# Patient Record
Sex: MALE | Race: WHITE | Employment: OTHER | URBAN - METROPOLITAN AREA
[De-identification: names, ages, dates, MRNs, and addresses within clinical notes are randomized per-mention and may not be internally consistent; named-entity substitution may affect disease eponyms.]

---

## 2017-03-01 ENCOUNTER — HOSPITAL ENCOUNTER (OUTPATIENT)
Dept: CT IMAGING | Age: 67
Discharge: HOME | End: 2017-03-01
Attending: INTERNAL MEDICINE | Admitting: INTERNAL MEDICINE
Payer: MEDICARE

## 2017-03-01 DIAGNOSIS — J62.8 SILICOSIS (HCC): ICD-10-CM

## 2017-03-01 PROCEDURE — 71250 CT THORAX DX C-: CPT

## 2017-03-29 ENCOUNTER — HOSPITAL ENCOUNTER (OUTPATIENT)
Dept: CT IMAGING | Age: 67
Discharge: HOME | End: 2017-03-29
Payer: MEDICARE

## 2017-03-29 DIAGNOSIS — M45.9 ANKYLOSING SPONDYLITIS (HCC): ICD-10-CM

## 2017-03-29 PROCEDURE — 72125 CT NECK SPINE W/O DYE: CPT

## 2017-05-17 ENCOUNTER — HOSPITAL ENCOUNTER (OUTPATIENT)
Dept: MRI IMAGING | Age: 67
Discharge: HOME | End: 2017-05-17
Payer: MEDICARE

## 2017-05-17 DIAGNOSIS — R93.89 ABNORMAL CT SCAN: ICD-10-CM

## 2017-05-17 PROCEDURE — 72141 MRI NECK SPINE W/O DYE: CPT

## 2017-12-07 ENCOUNTER — HOSPITAL ENCOUNTER (OUTPATIENT)
Dept: PULMONOLOGY | Age: 67
Setting detail: THERAPIES SERIES
Discharge: HOME | End: 2017-12-07
Payer: MEDICARE

## 2017-12-07 VITALS — HEIGHT: 71 IN | WEIGHT: 315 LBS | BODY MASS INDEX: 44.1 KG/M2

## 2017-12-07 PROCEDURE — G0239 OTH RESP PROC, GROUP: HCPCS

## 2017-12-07 PROCEDURE — G0238 OTH RESP PROC, INDIV: HCPCS

## 2017-12-07 PROCEDURE — G0237 THERAPEUTIC PROCD STRG ENDUR: HCPCS

## 2017-12-07 RX ORDER — METOPROLOL SUCCINATE 100 MG/1
100 TABLET, EXTENDED RELEASE ORAL DAILY
Status: ON HOLD | COMMUNITY
End: 2017-12-11

## 2017-12-07 RX ORDER — HYDROCHLOROTHIAZIDE 12.5 MG/1
12.5 TABLET ORAL DAILY
Status: ON HOLD | COMMUNITY
End: 2017-12-11 | Stop reason: HOSPADM

## 2017-12-07 RX ORDER — ALBUTEROL SULFATE 2.5 MG/3ML
2.5 SOLUTION RESPIRATORY (INHALATION) 4 TIMES DAILY
COMMUNITY

## 2017-12-07 RX ORDER — FLUTICASONE FUROATE AND VILANTEROL 200; 25 UG/1; UG/1
200 POWDER RESPIRATORY (INHALATION) DAILY
COMMUNITY

## 2017-12-07 RX ORDER — CITALOPRAM 40 MG/1
40 TABLET ORAL DAILY
COMMUNITY
End: 2020-06-01 | Stop reason: SDUPTHER

## 2017-12-07 RX ORDER — MELOXICAM 15 MG/1
15 TABLET ORAL DAILY
Status: ON HOLD | COMMUNITY
End: 2017-12-11 | Stop reason: HOSPADM

## 2017-12-07 NOTE — PROGRESS NOTES
Completed pt. evaluation including 6 MWT for participation in the pulmonary rehab program.Additon instruction provided on Sinan scales and SOB , instructed on PLB to help with SOB with walking.

## 2017-12-09 ENCOUNTER — HOSPITAL ENCOUNTER (INPATIENT)
Age: 67
LOS: 2 days | Discharge: HOME | DRG: 247 | End: 2017-12-11
Attending: EMERGENCY MEDICINE | Admitting: INTERNAL MEDICINE
Payer: MEDICARE

## 2017-12-09 DIAGNOSIS — I21.3 ST ELEVATION MYOCARDIAL INFARCTION (STEMI), UNSPECIFIED ARTERY (HCC): Primary | ICD-10-CM

## 2017-12-09 LAB
ALBUMIN SERPL-MCNC: 3.7 G/DL (ref 3.5–5.2)
ALBUMIN/GLOBULIN RATIO: 1.1 (ref 1–2.5)
ALLEN TEST: ABNORMAL
ALP BLD-CCNC: 262 U/L (ref 40–129)
ALT SERPL-CCNC: 32 U/L (ref 5–41)
ANION GAP SERPL CALCULATED.3IONS-SCNC: 15 MMOL/L (ref 9–17)
ANION GAP: 5 MMOL/L (ref 7–16)
AST SERPL-CCNC: 93 U/L
BILIRUB SERPL-MCNC: 0.43 MG/DL (ref 0.3–1.2)
BUN BLDV-MCNC: 16 MG/DL (ref 8–23)
BUN/CREAT BLD: ABNORMAL (ref 9–20)
CALCIUM SERPL-MCNC: 9.3 MG/DL (ref 8.6–10.4)
CHLORIDE BLD-SCNC: 98 MMOL/L (ref 98–107)
CO2: 23 MMOL/L (ref 20–31)
CREAT SERPL-MCNC: 0.77 MG/DL (ref 0.7–1.2)
FIO2: ABNORMAL
GFR AFRICAN AMERICAN: >60 ML/MIN
GFR NON-AFRICAN AMERICAN: >60 ML/MIN
GFR NON-AFRICAN AMERICAN: >60 ML/MIN
GFR SERPL CREATININE-BSD FRML MDRD: >60 ML/MIN
GFR SERPL CREATININE-BSD FRML MDRD: ABNORMAL ML/MIN/{1.73_M2}
GFR SERPL CREATININE-BSD FRML MDRD: ABNORMAL ML/MIN/{1.73_M2}
GFR SERPL CREATININE-BSD FRML MDRD: NORMAL ML/MIN/{1.73_M2}
GLUCOSE BLD-MCNC: 123 MG/DL (ref 70–99)
GLUCOSE BLD-MCNC: 185 MG/DL (ref 74–100)
HCO3 VENOUS: 25.8 MMOL/L (ref 22–29)
HCT VFR BLD CALC: 47.7 % (ref 40.7–50.3)
HEMOGLOBIN: 15.4 G/DL (ref 13–17)
MAGNESIUM: 1.9 MG/DL (ref 1.6–2.6)
MCH RBC QN AUTO: 28.2 PG (ref 25.2–33.5)
MCHC RBC AUTO-ENTMCNC: 32.3 G/DL (ref 28.4–34.8)
MCV RBC AUTO: 87.4 FL (ref 82.6–102.9)
MODE: ABNORMAL
NEGATIVE BASE EXCESS, VEN: ABNORMAL (ref 0–2)
O2 DEVICE/FLOW/%: ABNORMAL
O2 SAT, VEN: 90 % (ref 60–85)
PATIENT TEMP: ABNORMAL
PCO2, VEN: 36.7 MM HG (ref 41–51)
PDW BLD-RTO: 13.6 % (ref 11.8–14.4)
PH VENOUS: 7.46 (ref 7.32–7.43)
PLATELET # BLD: 215 K/UL (ref 138–453)
PMV BLD AUTO: 10.9 FL (ref 8.1–13.5)
PO2, VEN: 55.8 MM HG (ref 30–50)
POC CHLORIDE: 107 MMOL/L (ref 98–107)
POC CREATININE: 0.96 MG/DL (ref 0.51–1.19)
POC HEMATOCRIT: 52 % (ref 41–53)
POC HEMOGLOBIN: 17.8 G/DL (ref 13.5–17.5)
POC IONIZED CALCIUM: 1.17 MMOL/L (ref 1.15–1.33)
POC LACTIC ACID: 2.72 MMOL/L (ref 0.56–1.39)
POC PCO2 TEMP: ABNORMAL MM HG
POC PH TEMP: ABNORMAL
POC PO2 TEMP: ABNORMAL MM HG
POC POTASSIUM: 4.9 MMOL/L (ref 3.5–4.5)
POC SODIUM: 138 MMOL/L (ref 138–146)
POC TROPONIN I: 1.27 NG/ML (ref 0–0.1)
POC TROPONIN INTERP: ABNORMAL
POSITIVE BASE EXCESS, VEN: 2 (ref 0–3)
POTASSIUM SERPL-SCNC: 4 MMOL/L (ref 3.7–5.3)
RBC # BLD: 5.46 M/UL (ref 4.21–5.77)
SAMPLE SITE: ABNORMAL
SODIUM BLD-SCNC: 136 MMOL/L (ref 135–144)
TOTAL CO2, VENOUS: 27 MMOL/L (ref 23–30)
TOTAL PROTEIN: 7 G/DL (ref 6.4–8.3)
TROPONIN INTERP: ABNORMAL
TROPONIN T: 1.99 NG/ML
TSH SERPL DL<=0.05 MIU/L-ACNC: 0.96 MIU/L (ref 0.3–5)
WBC # BLD: 12.4 K/UL (ref 3.5–11.3)

## 2017-12-09 PROCEDURE — 82803 BLOOD GASES ANY COMBINATION: CPT

## 2017-12-09 PROCEDURE — 82435 ASSAY OF BLOOD CHLORIDE: CPT

## 2017-12-09 PROCEDURE — 6370000000 HC RX 637 (ALT 250 FOR IP): Performed by: INTERNAL MEDICINE

## 2017-12-09 PROCEDURE — 2000000000 HC ICU R&B

## 2017-12-09 PROCEDURE — C1769 GUIDE WIRE: HCPCS

## 2017-12-09 PROCEDURE — 82565 ASSAY OF CREATININE: CPT

## 2017-12-09 PROCEDURE — 93458 L HRT ARTERY/VENTRICLE ANGIO: CPT | Performed by: INTERNAL MEDICINE

## 2017-12-09 PROCEDURE — 93005 ELECTROCARDIOGRAM TRACING: CPT

## 2017-12-09 PROCEDURE — 84132 ASSAY OF SERUM POTASSIUM: CPT

## 2017-12-09 PROCEDURE — 83605 ASSAY OF LACTIC ACID: CPT

## 2017-12-09 PROCEDURE — 84484 ASSAY OF TROPONIN QUANT: CPT

## 2017-12-09 PROCEDURE — 2580000003 HC RX 258: Performed by: INTERNAL MEDICINE

## 2017-12-09 PROCEDURE — 85014 HEMATOCRIT: CPT

## 2017-12-09 PROCEDURE — C1874 STENT, COATED/COV W/DEL SYS: HCPCS

## 2017-12-09 PROCEDURE — 6360000002 HC RX W HCPCS

## 2017-12-09 PROCEDURE — 82947 ASSAY GLUCOSE BLOOD QUANT: CPT

## 2017-12-09 PROCEDURE — 92941 PRQ TRLML REVSC TOT OCCL AMI: CPT | Performed by: INTERNAL MEDICINE

## 2017-12-09 PROCEDURE — 99285 EMERGENCY DEPT VISIT HI MDM: CPT

## 2017-12-09 PROCEDURE — 6370000000 HC RX 637 (ALT 250 FOR IP)

## 2017-12-09 PROCEDURE — 83735 ASSAY OF MAGNESIUM: CPT

## 2017-12-09 PROCEDURE — 82330 ASSAY OF CALCIUM: CPT

## 2017-12-09 PROCEDURE — 2500000003 HC RX 250 WO HCPCS

## 2017-12-09 PROCEDURE — 84295 ASSAY OF SERUM SODIUM: CPT

## 2017-12-09 PROCEDURE — 85027 COMPLETE CBC AUTOMATED: CPT

## 2017-12-09 PROCEDURE — 83036 HEMOGLOBIN GLYCOSYLATED A1C: CPT

## 2017-12-09 PROCEDURE — 92920 PRQ TRLUML C ANGIOP 1ART&/BR: CPT | Performed by: INTERNAL MEDICINE

## 2017-12-09 PROCEDURE — C1894 INTRO/SHEATH, NON-LASER: HCPCS

## 2017-12-09 PROCEDURE — 027034Z DILATION OF CORONARY ARTERY, ONE ARTERY WITH DRUG-ELUTING INTRALUMINAL DEVICE, PERCUTANEOUS APPROACH: ICD-10-PCS | Performed by: INTERNAL MEDICINE

## 2017-12-09 PROCEDURE — 02C13ZZ EXTIRPATION OF MATTER FROM CORONARY ARTERY, TWO ARTERIES, PERCUTANEOUS APPROACH: ICD-10-PCS | Performed by: INTERNAL MEDICINE

## 2017-12-09 PROCEDURE — 80053 COMPREHEN METABOLIC PANEL: CPT

## 2017-12-09 PROCEDURE — 4A023N7 MEASUREMENT OF CARDIAC SAMPLING AND PRESSURE, LEFT HEART, PERCUTANEOUS APPROACH: ICD-10-PCS | Performed by: INTERNAL MEDICINE

## 2017-12-09 PROCEDURE — C1760 CLOSURE DEV, VASC: HCPCS

## 2017-12-09 PROCEDURE — C1725 CATH, TRANSLUMIN NON-LASER: HCPCS

## 2017-12-09 PROCEDURE — 84443 ASSAY THYROID STIM HORMONE: CPT

## 2017-12-09 PROCEDURE — 36415 COLL VENOUS BLD VENIPUNCTURE: CPT

## 2017-12-09 PROCEDURE — C1757 CATH, THROMBECTOMY/EMBOLECT: HCPCS

## 2017-12-09 PROCEDURE — B2151ZZ FLUOROSCOPY OF LEFT HEART USING LOW OSMOLAR CONTRAST: ICD-10-PCS | Performed by: INTERNAL MEDICINE

## 2017-12-09 PROCEDURE — C1887 CATHETER, GUIDING: HCPCS

## 2017-12-09 PROCEDURE — B2111ZZ FLUOROSCOPY OF MULTIPLE CORONARY ARTERIES USING LOW OSMOLAR CONTRAST: ICD-10-PCS | Performed by: INTERNAL MEDICINE

## 2017-12-09 RX ORDER — FLUTICASONE FUROATE AND VILANTEROL 200; 25 UG/1; UG/1
200 POWDER RESPIRATORY (INHALATION) DAILY
Status: DISCONTINUED | OUTPATIENT
Start: 2017-12-09 | End: 2017-12-09 | Stop reason: CLARIF

## 2017-12-09 RX ORDER — ACETAMINOPHEN 325 MG/1
650 TABLET ORAL EVERY 4 HOURS PRN
Status: DISCONTINUED | OUTPATIENT
Start: 2017-12-09 | End: 2017-12-11 | Stop reason: HOSPADM

## 2017-12-09 RX ORDER — LABETALOL HYDROCHLORIDE 5 MG/ML
10 INJECTION, SOLUTION INTRAVENOUS EVERY 4 HOURS PRN
Status: DISCONTINUED | OUTPATIENT
Start: 2017-12-09 | End: 2017-12-11 | Stop reason: HOSPADM

## 2017-12-09 RX ORDER — SODIUM CHLORIDE 0.9 % (FLUSH) 0.9 %
10 SYRINGE (ML) INJECTION EVERY 12 HOURS SCHEDULED
Status: DISCONTINUED | OUTPATIENT
Start: 2017-12-09 | End: 2017-12-11 | Stop reason: HOSPADM

## 2017-12-09 RX ORDER — MONTELUKAST SODIUM 10 MG/1
10 TABLET ORAL NIGHTLY
COMMUNITY

## 2017-12-09 RX ORDER — SODIUM CHLORIDE 0.9 % (FLUSH) 0.9 %
10 SYRINGE (ML) INJECTION PRN
Status: DISCONTINUED | OUTPATIENT
Start: 2017-12-09 | End: 2017-12-11 | Stop reason: HOSPADM

## 2017-12-09 RX ORDER — ASPIRIN 81 MG/1
81 TABLET, CHEWABLE ORAL DAILY
Status: DISCONTINUED | OUTPATIENT
Start: 2017-12-10 | End: 2017-12-11 | Stop reason: HOSPADM

## 2017-12-09 RX ORDER — ATORVASTATIN CALCIUM 80 MG/1
80 TABLET, FILM COATED ORAL NIGHTLY
Status: DISCONTINUED | OUTPATIENT
Start: 2017-12-09 | End: 2017-12-11 | Stop reason: HOSPADM

## 2017-12-09 RX ORDER — ONDANSETRON 2 MG/ML
4 INJECTION INTRAMUSCULAR; INTRAVENOUS EVERY 6 HOURS PRN
Status: DISCONTINUED | OUTPATIENT
Start: 2017-12-09 | End: 2017-12-11 | Stop reason: HOSPADM

## 2017-12-09 RX ORDER — CITALOPRAM 20 MG/1
40 TABLET ORAL DAILY
Status: DISCONTINUED | OUTPATIENT
Start: 2017-12-10 | End: 2017-12-11 | Stop reason: HOSPADM

## 2017-12-09 RX ADMIN — Medication 10 ML: at 21:02

## 2017-12-09 RX ADMIN — MOMETASONE FUROATE AND FORMOTEROL FUMARATE DIHYDRATE 2 PUFF: 200; 5 AEROSOL RESPIRATORY (INHALATION) at 22:21

## 2017-12-09 RX ADMIN — METOPROLOL TARTRATE 25 MG: 25 TABLET ORAL at 21:02

## 2017-12-09 RX ADMIN — ATORVASTATIN CALCIUM 80 MG: 80 TABLET, FILM COATED ORAL at 21:02

## 2017-12-09 RX ADMIN — TICAGRELOR 90 MG: 90 TABLET ORAL at 21:02

## 2017-12-09 ASSESSMENT — ENCOUNTER SYMPTOMS
BLOOD IN STOOL: 0
ABDOMINAL PAIN: 0
VOMITING: 0
SINUS PRESSURE: 0
SINUS PAIN: 0
COLOR CHANGE: 0
TROUBLE SWALLOWING: 0
NAUSEA: 0
CONSTIPATION: 0
COUGH: 0
ABDOMINAL DISTENTION: 0
PHOTOPHOBIA: 0
VOICE CHANGE: 0
CHOKING: 0
DIARRHEA: 0
BACK PAIN: 1
SORE THROAT: 0
WHEEZING: 0
SHORTNESS OF BREATH: 1
CHEST TIGHTNESS: 1

## 2017-12-09 ASSESSMENT — PAIN SCALES - GENERAL: PAINLEVEL_OUTOF10: 0

## 2017-12-09 NOTE — OP NOTE
[facility]  Coronary Angiography        Date:   12/9/2017  Patient name:  Carter Felder  Date of admission:  12/9/2017  2:10 PM  MRN:   [id number]  YOB: 1950      CARDIAC CATHETERIZATION      Operators:  Primary: Dr. Rosalinda Calvert (Attending Physician)      Indications for cath:   STEMI, Anterior      Procedure performed:  Coronary angiography, LV gram     Access:   Right Femoral artery      After informed consent was obtained with explanation of the risks and benefits, patient was brought to the cath lab. The right groin were prepped and draped in sterile fashion. 1% lidocaine was used for local block. The right femoral artery was cannulated with 6 Fr sheath with brisk arterial blood return. The side port was frequently flushed and aspirated with normal saline. Findings:    Left main: Normal    LAD: Acute 24 mm ostial 100% occlusion requiring PTCA and DAGO (Xience 3.5 x 28) - reducing stenosis to 0% with LOR-III flow    LCX: Mild irregularities, 20-30% stenosis    RCA: Mild irregularities, 20-30% stenosis    Ramus: Mild irregularities, 20-30% stenosis  After PTCA to LAD, sudden occlusion of distal Ramus requiring thrombectomy and PTCA with restoration of LOR-III flow    The LV gram was performed in the LANE 30 position. LVEF: 35%. LV Wall Motion: Anterolateral and apical wall akinesis      Conclusions:  1. Acute occlusion of ostial LAD requiring PTCA and DAGO (Xience 3.5 x 28)  2. After PTCA to LAD, sudden occlusion of distal Ramus requiring thrombectomy and PTCA with restoration of LOR-III flow  3. Moderately reduced LVEF 35%      Recommendations:  1. Post-stent protocol  2. Dual-antiplatelet therapy for 1 year (ASA and Brilinta)  3. Continue optimal medical therapy  4.  Risk factor modification      Teresa Zambrano MD  Cardiovascular Diseases Fellow  [address] [place] [address]    Attending Physician Statement  I have discussed the case of Carter Felder including

## 2017-12-09 NOTE — ED PROVIDER NOTES
[facility] ED  Emergency Department Encounter  Emergency Medicine Resident     Pt Name: Maddi Alvarado  MRN: [id number]  [place] 1950  Date of evaluation: 12/9/17  PCP:  Julio Cesar Patricia DO    CHIEF COMPLAINT       Chief Complaint   Patient presents with    Chest Pain     HISTORY OF PRESENT ILLNESS  (Location/Symptom, Timing/Onset, Context/Setting, Quality, Duration, Modifying Factors, Severity.)      Maddi Alvarado is a 79 y.o. male who presented to the emergency department with Acute substernal chest pain that radiated to both arms and back since 11:00 this morning. The patient has a 25 year history of SVT and felt as though the pain and palpitations were related to his history of SVT. On EMS report a STEMI alert was called. We contacted Dr. Bao West, on on-call interventional cardiology, who asked for EKGs to be sent by text message. EKGs were faxed including an old EKG (11/22/2017) brought in by the patient's wife which showed significant changes. Cardiology fellow arrived the patient's bedside and began proceedings to move the patient Cath Lab. PAST MEDICAL / SURGICAL / SOCIAL / FAMILY HISTORY     Past Medical Hx:    has a past medical history of Asthma not dependent on systemic steroids without complication; DEB treated with BiPAP; and Pulmonary hypertension. Patient also has a history of SVT for 25 years. Past Surgical Hx:  Patient has had no surgeries    Social Hx:  Social History     Social History    Marital status:      Spouse name: N/A    Number of children: N/A    Years of education: N/A     Occupational History    Not on file.      Social History Main Topics    Smoking status: Not on file    Smokeless tobacco: Not on file    Alcohol use Not on file    Drug use: Unknown    Sexual activity: Not on file     Other Topics Concern    Not on file     Social History Narrative    No narrative on file     Family Hx:  No relevant family history is headaches. PHYSICAL EXAM   (up to 7 for level 4, 8 or more for level 5)      BP (!) 142/92   Pulse 90   Temp 98.2 °F (36.8 °C) (Oral)   Resp 15   Ht 5' 11\" (1.803 m)   Wt (!) 309 lb (140.2 kg)   SpO2 97%   BMI 43.10 kg/m²     Physical Exam   Constitutional: He is oriented to person, place, and time. He appears well-developed and well-nourished. No distress. HENT:   Head: Normocephalic and atraumatic. Right Ear: External ear normal.   Left Ear: External ear normal.   Eyes: EOM are normal. Pupils are equal, round, and reactive to light. Neck: Normal range of motion. No JVD present. No tracheal deviation present. Cardiovascular: Normal rate, normal heart sounds and intact distal pulses. Exam reveals no gallop and no friction rub. No murmur heard. Pulmonary/Chest: Effort normal and breath sounds normal. No respiratory distress. He has no wheezes. Abdominal: Soft. He exhibits no distension. There is no tenderness. Musculoskeletal: Normal range of motion. He exhibits no deformity. Neurological: He is alert and oriented to person, place, and time. Skin: Skin is warm and dry. He is not diaphoretic. DIFFERENTIAL  DIAGNOSIS     PLAN (LABS / IMAGING / EKG):  Orders Placed This Encounter   Procedures    Hemoglobin and hematocrit, blood    SODIUM (POC)    POTASSIUM (POC)    CHLORIDE (POC)    CALCIUM, IONIC (POC)    POCT troponin    Venous Blood Gas, POC    Creatinine W/GFR Point of Care    Lactic Acid, POC    POCT Glucose    Anion Gap (Calc) POC     MEDICATIONS ORDERED:  No orders of the defined types were placed in this encounter.     DDX:     Chest Pain DDX:   Emergent: ACS/NSTEMI/STEMI/angina, arrhythmia, trauma, aortic dissection,  PE, PNA, pneumothroax, esophageal rupture, tamponade, Cocaine use  Nonemergent: pneumonia, pericarditis, GERD, MSK, Endocarditis, anxiety  Evaluated for: diaphoresis, present chest pain, tachypnea, BP both arms, heart sounds, JVD, tender chest wall, wheezing    DIAGNOSTIC RESULTS / EMERGENCY DEPARTMENT COURSE / MDM     LABS:  Results for orders placed or performed during the hospital encounter of 12/09/17   Hemoglobin and hematocrit, blood   Result Value Ref Range    POC Hemoglobin 17.8 (H) 13.5 - 17.5 g/dL    POC Hematocrit 52 41 - 53 %   SODIUM (POC)   Result Value Ref Range    POC Sodium 138 138 - 146 mmol/L   POTASSIUM (POC)   Result Value Ref Range    POC Potassium 4.9 (H) 3.5 - 4.5 mmol/L   CHLORIDE (POC)   Result Value Ref Range    POC Chloride 107 98 - 107 mmol/L   CALCIUM, IONIC (POC)   Result Value Ref Range    POC Ionized Calcium 1.17 1.15 - 1.33 mmol/L   POCT troponin   Result Value Ref Range    POC Troponin I 1.27 (HH) 0.00 - 0.10 ng/mL    POC Troponin Interp       The Troponin-I (POC) results cannot be compared to the Troponin-T results.    Venous Blood Gas, POC   Result Value Ref Range    pH, Wil 7.456 (H) 7.320 - 7.430    pCO2, Wil 36.7 (L) 41.0 - 51.0 mm Hg    pO2, Wil 55.8 (H) 30.0 - 50.0 mm Hg    HCO3, Venous 25.8 22.0 - 29.0 mmol/L    Total CO2, Venous 27 23.0 - 30.0 mmol/L    Negative Base Excess, Wil NOT REPORTED 0.0 - 2.0    Positive Base Excess, Wil 2 0.0 - 3.0    O2 Sat, Wil 90 (H) 60.0 - 85.0 %    O2 Device/Flow/% NOT REPORTED     Wilbert Test NOT REPORTED     Sample Site NOT REPORTED     Mode NOT REPORTED     FIO2 NOT REPORTED     Pt Temp NOT REPORTED     POC pH Temp NOT REPORTED     POC pCO2 Temp NOT REPORTED mm Hg    POC pO2 Temp NOT REPORTED mm Hg   Creatinine W/GFR Point of Care   Result Value Ref Range    POC Creatinine 0.96 0.51 - 1.19 mg/dL    GFR Comment >60 >60 mL/min    GFR Non-African American >60 >60 mL/min    GFR Comment         Lactic Acid, POC   Result Value Ref Range    POC Lactic Acid 2.72 (H) 0.56 - 1.39 mmol/L   POCT Glucose   Result Value Ref Range    POC Glucose 185 (H) 74 - 100 mg/dL   Anion Gap (Calc) POC   Result Value Ref Range    Anion Gap 5 (L) 7 - 16 mmol/L     IMPRESSION:   Acute myocardial infarction    RADIOLOGY:  Not indicated  No results found.     EKG  Rhythm: normal sinus   Rate: normal 90 beats per minute  Axis: normal  Ectopy: none  Conduction: normal  ST Segments: elevation in  v1, v2, v3, aVr and aVl and depression in  lateral leads and inferior leads  T Waves: no acute change and inversion in  I and aVl  Q Waves: v1 and v2    Clinical Impression: myocardial infarction  anterior and septal    EKG Interpretation #2 (posterior leads)  Rhythm: normal sinus   Rate: normal  Axis: normal  Ectopy: none  Conduction: normal  ST Segments: no posterior lead elevation; elevation L, R, V1, 2  T Waves: inversion I, L, posterior V4-6  Q Waves: none     Clinical Impression: no acute changes and normal posterior wall EKG    Paulino Suazo MD  Emergency Medicine Resident    All EKG's are interpreted by the Emergency Department Physician who either signs or Co-signs this chart in the absence of a cardiologist.    EMERGENCY DEPARTMENT COURSE:  Prehospital initiated STEMI alert  Physical exam  Labs and repeat EKG  Consultation with interventional cardiology  Patient to the Cath Lab    PROCEDURES:  None    CONSULTS:  Interventional cardiologist    FINAL IMPRESSION      Acute ST segment elevation MI - anteroseptal with lateral involvement    DISPOSITION / PLAN     Disposition: Lorraine Gallegos MD  Emergency Medicine Resident    (Please note that portions of this note were completed with a voice recognition program.  Efforts were made to edit the dictations but occasionally words are mis-transcribed.)       She Arzate MD  Resident  12/09/17 8453

## 2017-12-09 NOTE — FLOWSHEET NOTE
[facility] CARE DEPARTMENT - [address]     Emergency/Trauma Note    PATIENT NAME: Alyx Denis    Shift date: 12/9/2017  Shift day: Saturday   Shift # 1    Room # [facility]/G[facility]OTA   Name: Alyx Denis            Age: 79 y.o. Gender: male          Restorationism: Oriental orthodox   Place of Advent:     Trauma/Incident type: Stemi Alert  Admit Date & Time: 12/9/2017  2:10 PM  TRAUMA NAME:         PATIENT/EVENT DESCRIPTION:  Alyx Denis is a 79 y.o. male who arrived via EMS from homeas a stemi alert. Patient was awake and alert and was able to communicate. Per spouse Jose Cronin, patient was having chest pains. Pt to be admitted to G[facility]IOTA/GAVIOTA. SPIRITUAL ASSESSMENT/INTERVENTION:   responded to page, patient was being attended to by trauma team. Ericka Jimenez engaged patient's wife Jose Cronin and his daughter Alivia Limon in conversation. Family stated that patient was having chest pains so they called the EMS.  nurtured hope while providing a ministry of presence.  offered hospitality and escorted family over to the cath lab.  notified medical team that family was in the waiting room. PATIENT BELONGINGS:  No belongings noted    ANY BELONGINGS OF SIGNIFICANT VALUE NOTED:  None    REGISTRATION STAFF NOTIFIED? Yes      WHAT IS YOUR SPIRITUAL CARE PLAN FOR THIS PATIENT?:   Ericka Jimenez will ask other  to visit with patient to offer spiritual and emotional support since it was not possible to do so earlier. Electronically signed by Monik Bennett, on 12/9/2017 at 3:27 PM.     12/09/17 1523   Encounter Summary   Services provided to: Family   Referral/Consult From: Multi-disciplinary team   Support System Spouse; Children   Continue Visiting (12/9/2017)   Complexity of Encounter High   Length of Encounter 45 minutes   Spiritual Assessment Completed Yes   Crisis   Type Stemi Alert   Assessment Calm; Approachable   Intervention Active listening;Explored feelings, thoughts, concerns;Explored coping resources;Nurtured hope;Sustaining presence/ Ministry of presence   Outcome Expressed gratitude;Engaged in conversation;Receptive   Spiritual/Jew   Type Spiritual support   Assessment Calm; Approachable;Coping   Intervention Active listening;Explored feelings, thoughts, concerns;Explored coping resources;Nurtured hope;Sustaining presence/ Ministry of presence   Outcome Expressed gratitude;Coping;Engaged in conversation;Receptive

## 2017-12-09 NOTE — H&P
stated age  [de-identified]: PERRLA, no cervical lymphadenopathy. No masses palpable. Normal oral mucosa  Respiratory:  · Normal excursion and expansion without use of accessory muscles  · Resp Auscultation: Fair respiratory effort. No increased work of breathing. · Clear to auscultation bilaterally  Cardiovascular:  · Normal S1 and S2. RRR  · No murmurs  · Jugular venous pulsation Normal  · Peripheral pulses are symmetrical and full   Abdomen:   · Soft  · No tenderness  · Bowel sounds present  Extremities:  · No cyanosis or clubbing  · No lower extremity edema  · Skin: Warm and dry  Neurologic:  · Alert and oriented. · Moves all extremities well  Psychiatric:  · No abnormalities of mood, affect, memory, mentation, or behavior are noted      DATA:    Diagnostics:    EKG:   Normal sinus rhythm, ST elevation in V1 through V4 with ST depression in II, III, aVF      ECHO: 2/3/2016  Echo contrast utilized on this technically difficult study. Mild left ventricular hypertrophy. Normal left ventricle size and function  with an estimated EF > 55%. No segmental wall motion abnormalities seen. Normal right ventricular size and function. Normal aortic valve structure and function without stenosis or  regurgitation. Trivial mitral regurgitation. Mild tricuspid regurgitation. Estimated right ventricular systolic pressure  is 19 mmHg. No significant pericardial effusion is seen. Labs:     BMP:   Recent Labs      12/09/17   1415   CREATININE  0.96   LABGLOM  >60     CARDIAC ENZYMES:  Recent Labs      12/09/17   1413   TROPONINI  1.27*     IMPRESSION:    1. Anterior STEMI  2. HTN  3. H/O SVT  4. Morbid obesity  5. DEB on CPAP  6. Chronic back pain s/p neck fusion      RECOMMENDATIONS:  1. Emergent left heart cath  2. Post-cath orders to follow    Risk and benefits of cardiac catheterization were discussed in detail.  Risk of bleeding, requiring blood transfusion, vascular complication requiring surgery, renal insufficieny with

## 2017-12-10 PROBLEM — M45.0 ANKYLOSING SPONDYLITIS OF MULTIPLE SITES IN SPINE (HCC): Status: ACTIVE | Noted: 2017-12-10

## 2017-12-10 LAB
ALBUMIN SERPL-MCNC: 3.6 G/DL (ref 3.5–5.2)
ALBUMIN/GLOBULIN RATIO: 1.2 (ref 1–2.5)
ALP BLD-CCNC: 243 U/L (ref 40–129)
ALT SERPL-CCNC: 34 U/L (ref 5–41)
ANION GAP SERPL CALCULATED.3IONS-SCNC: 14 MMOL/L (ref 9–17)
AST SERPL-CCNC: 104 U/L
BILIRUB SERPL-MCNC: 0.58 MG/DL (ref 0.3–1.2)
BUN BLDV-MCNC: 18 MG/DL (ref 8–23)
BUN/CREAT BLD: ABNORMAL (ref 9–20)
CALCIUM SERPL-MCNC: 8.6 MG/DL (ref 8.6–10.4)
CHLORIDE BLD-SCNC: 97 MMOL/L (ref 98–107)
CHOLESTEROL/HDL RATIO: 3.5
CHOLESTEROL: 165 MG/DL
CO2: 24 MMOL/L (ref 20–31)
CREAT SERPL-MCNC: 0.67 MG/DL (ref 0.7–1.2)
ESTIMATED AVERAGE GLUCOSE: 143 MG/DL
GFR AFRICAN AMERICAN: >60 ML/MIN
GFR NON-AFRICAN AMERICAN: >60 ML/MIN
GFR SERPL CREATININE-BSD FRML MDRD: ABNORMAL ML/MIN/{1.73_M2}
GFR SERPL CREATININE-BSD FRML MDRD: ABNORMAL ML/MIN/{1.73_M2}
GLUCOSE BLD-MCNC: 122 MG/DL (ref 70–99)
HBA1C MFR BLD: 6.6 % (ref 4–6)
HCT VFR BLD CALC: 45.4 % (ref 40.7–50.3)
HDLC SERPL-MCNC: 47 MG/DL
HEMOGLOBIN: 14.7 G/DL (ref 13–17)
LDL CHOLESTEROL: 99 MG/DL (ref 0–130)
MAGNESIUM: 1.8 MG/DL (ref 1.6–2.6)
MCH RBC QN AUTO: 28.2 PG (ref 25.2–33.5)
MCHC RBC AUTO-ENTMCNC: 32.4 G/DL (ref 28.4–34.8)
MCV RBC AUTO: 87.1 FL (ref 82.6–102.9)
PDW BLD-RTO: 13.4 % (ref 11.8–14.4)
PLATELET # BLD: 189 K/UL (ref 138–453)
PMV BLD AUTO: 10.2 FL (ref 8.1–13.5)
POTASSIUM SERPL-SCNC: 3.4 MMOL/L (ref 3.7–5.3)
RBC # BLD: 5.21 M/UL (ref 4.21–5.77)
SODIUM BLD-SCNC: 135 MMOL/L (ref 135–144)
TOTAL PROTEIN: 6.7 G/DL (ref 6.4–8.3)
TRIGL SERPL-MCNC: 95 MG/DL
TROPONIN INTERP: ABNORMAL
TROPONIN INTERP: ABNORMAL
TROPONIN T: 1.77 NG/ML
TROPONIN T: 1.95 NG/ML
VLDLC SERPL CALC-MCNC: NORMAL MG/DL (ref 1–30)
WBC # BLD: 11.1 K/UL (ref 3.5–11.3)

## 2017-12-10 PROCEDURE — 94640 AIRWAY INHALATION TREATMENT: CPT

## 2017-12-10 PROCEDURE — 90686 IIV4 VACC NO PRSV 0.5 ML IM: CPT | Performed by: FAMILY MEDICINE

## 2017-12-10 PROCEDURE — 36415 COLL VENOUS BLD VENIPUNCTURE: CPT

## 2017-12-10 PROCEDURE — 6360000002 HC RX W HCPCS: Performed by: INTERNAL MEDICINE

## 2017-12-10 PROCEDURE — 94762 N-INVAS EAR/PLS OXIMTRY CONT: CPT

## 2017-12-10 PROCEDURE — 83735 ASSAY OF MAGNESIUM: CPT

## 2017-12-10 PROCEDURE — 85027 COMPLETE CBC AUTOMATED: CPT

## 2017-12-10 PROCEDURE — 6360000002 HC RX W HCPCS: Performed by: FAMILY MEDICINE

## 2017-12-10 PROCEDURE — 84484 ASSAY OF TROPONIN QUANT: CPT

## 2017-12-10 PROCEDURE — 2580000003 HC RX 258: Performed by: INTERNAL MEDICINE

## 2017-12-10 PROCEDURE — 6370000000 HC RX 637 (ALT 250 FOR IP): Performed by: INTERNAL MEDICINE

## 2017-12-10 PROCEDURE — 80053 COMPREHEN METABOLIC PANEL: CPT

## 2017-12-10 PROCEDURE — G0008 ADMIN INFLUENZA VIRUS VAC: HCPCS | Performed by: FAMILY MEDICINE

## 2017-12-10 PROCEDURE — 2060000000 HC ICU INTERMEDIATE R&B

## 2017-12-10 PROCEDURE — 80061 LIPID PANEL: CPT

## 2017-12-10 RX ORDER — MAGNESIUM SULFATE 1 G/100ML
1 INJECTION INTRAVENOUS PRN
Status: DISCONTINUED | OUTPATIENT
Start: 2017-12-10 | End: 2017-12-11 | Stop reason: HOSPADM

## 2017-12-10 RX ORDER — LISINOPRIL 10 MG/1
10 TABLET ORAL DAILY
Status: DISCONTINUED | OUTPATIENT
Start: 2017-12-10 | End: 2017-12-11 | Stop reason: HOSPADM

## 2017-12-10 RX ORDER — POTASSIUM CHLORIDE 20 MEQ/1
40 TABLET, EXTENDED RELEASE ORAL 2 TIMES DAILY WITH MEALS
Status: DISPENSED | OUTPATIENT
Start: 2017-12-10 | End: 2017-12-11

## 2017-12-10 RX ADMIN — ATORVASTATIN CALCIUM 80 MG: 80 TABLET, FILM COATED ORAL at 21:32

## 2017-12-10 RX ADMIN — POTASSIUM CHLORIDE 40 MEQ: 20 TABLET, EXTENDED RELEASE ORAL at 09:07

## 2017-12-10 RX ADMIN — TICAGRELOR 90 MG: 90 TABLET ORAL at 21:32

## 2017-12-10 RX ADMIN — TICAGRELOR 90 MG: 90 TABLET ORAL at 08:54

## 2017-12-10 RX ADMIN — METOPROLOL TARTRATE 25 MG: 25 TABLET ORAL at 21:32

## 2017-12-10 RX ADMIN — MAGNESIUM SULFATE HEPTAHYDRATE 1 G: 1 INJECTION, SOLUTION INTRAVENOUS at 09:51

## 2017-12-10 RX ADMIN — METOPROLOL TARTRATE 25 MG: 25 TABLET ORAL at 08:54

## 2017-12-10 RX ADMIN — MOMETASONE FUROATE AND FORMOTEROL FUMARATE DIHYDRATE 2 PUFF: 200; 5 AEROSOL RESPIRATORY (INHALATION) at 08:09

## 2017-12-10 RX ADMIN — ASPIRIN 81 MG: 81 TABLET, CHEWABLE ORAL at 08:54

## 2017-12-10 RX ADMIN — INFLUENZA VIRUS VACCINE 0.5 ML: 15; 15; 15; 15 SUSPENSION INTRAMUSCULAR at 09:07

## 2017-12-10 RX ADMIN — MOMETASONE FUROATE AND FORMOTEROL FUMARATE DIHYDRATE 2 PUFF: 200; 5 AEROSOL RESPIRATORY (INHALATION) at 21:24

## 2017-12-10 RX ADMIN — Medication 10 ML: at 21:36

## 2017-12-10 RX ADMIN — ACETAMINOPHEN 650 MG: 325 TABLET ORAL at 09:07

## 2017-12-10 RX ADMIN — CITALOPRAM 40 MG: 20 TABLET, FILM COATED ORAL at 08:54

## 2017-12-10 RX ADMIN — LISINOPRIL 10 MG: 10 TABLET ORAL at 11:00

## 2017-12-10 RX ADMIN — MAGNESIUM SULFATE HEPTAHYDRATE 1 G: 1 INJECTION, SOLUTION INTRAVENOUS at 10:56

## 2017-12-10 ASSESSMENT — PAIN SCALES - GENERAL
PAINLEVEL_OUTOF10: 0
PAINLEVEL_OUTOF10: 3

## 2017-12-10 NOTE — CARE COORDINATION
Case Management Initial Discharge Plan  Wallace Moore,         Readmission Risk              Readmission Risk:        8.25       Age 72 or Greater:  1    Admitted from SNF or Requires Paid or Family Care:  0    Currently has CHF,COPD,ARF,CRI,or is on dialysis:  0    Takes more than 5 Prescription Medications:  4    Takes Digoxin,Insulin,Anticoagulants,Narcotics or ASA/Plavix:  2    [address] in Past 12 Months:  0    On Disability:  0    Patient Considers own Health:  1.25            Met with:spouse/SO to discuss discharge plans.    Information verified: address, contacts, phone number, , insurance Yes  PCP: Alana Chambers DO  Date of last visit: 6 months ago    Insurance Provider: [employer] Medicare    Discharge Planning  Current Residence:  Private Residence  Living Arrangements:  Spouse/Significant Other, Family Members   Home has 2 stories/4 stairs to climb  Support Systems:  Spouse/Significant Other, Family Members  Current Services PTA:  Home Bipap Supplier: Pharmacy Counter  Patient able to perform ADL's:Independent  DME used to aid ambulation prior to admission: none/during admission    Potential Assistance Needed:  N/A    Pharmacy: [facility] on [employer] [address] [employer] Medications:  No  Does patient want to participate in local refill/ meds to beds program?  No    Patient agreeable to home care: No  [place] of choice provided:  no      Type of Home Care Services:  None  Patient expects to be discharged to:  home    Prior SNF/Rehab Placement and Facility: no  Agreeable to SNF/Rehab: No  [place] of choice provided: no   Evaluation: no    Expected Discharge date:  17  Follow Up Appointment: Best Day/ Time:      Transportation provider: wife  Transportation arrangements needed for discharge: No    Discharge Plan: home with wife        Electronically signed by Mike Salgado RN on 12/10/17 at 12:38 PM

## 2017-12-10 NOTE — PROGRESS NOTES
Progress Note(Hospital)    CC:Chest Pain    HCC:  Patient currently doing well post-cath. Patient with history of ankylosing spondylitis. Patient states that he had been up in the [place] and had been seen in the emergency room out there but did not pursue staying for further investigation. So he signed himself out. He apparently started not feeling well again 14 days later and came into the emergency room for evaluation. The prior month the patient was not having any problems according to his recollection.             Current Facility-Administered Medications:     lisinopril (PRINIVIL;ZESTRIL) tablet 10 mg, 10 mg, Oral, Daily, Young Contreras MD, 10 mg at 12/10/17 1100    magnesium sulfate 1 g in dextrose 5% 100 mL IVPB, 1 g, Intravenous, PRN, Young Contreras MD, Last Rate: 100 mL/hr at 12/10/17 1056, 1 g at 12/10/17 1056    potassium chloride (KLOR-CON M) extended release tablet 40 mEq, 40 mEq, Oral, BID WC, Young Contreras MD, 40 mEq at 12/10/17 0907    citalopram (CELEXA) tablet 40 mg, 40 mg, Oral, Daily, Young Contreras MD, 40 mg at 12/10/17 0854    sodium chloride flush 0.9 % injection 10 mL, 10 mL, Intravenous, 2 times per day, Young Contreras MD, 10 mL at 12/09/17 2102    sodium chloride flush 0.9 % injection 10 mL, 10 mL, Intravenous, PRN, Young Contreras MD    acetaminophen (TYLENOL) tablet 650 mg, 650 mg, Oral, Q4H PRN, Young Contreras MD, 650 mg at 12/10/17 0907    magnesium hydroxide (MILK OF MAGNESIA) 400 MG/5ML suspension 30 mL, 30 mL, Oral, Daily PRN, Young Contreras MD    ondansetron [facility]) injection 4 mg, 4 mg, Intravenous, Q6H PRN, Young Contreras MD    atorvastatin (LIPITOR) tablet 80 mg, 80 mg, Oral, Nightly, Young Contreras MD, 80 mg at 12/09/17 2102    metoprolol tartrate (LOPRESSOR) tablet 25 mg, 25 mg, Oral, BID, Young Contreras MD, 25 mg at 12/10/17 0854    aspirin chewable tablet 81 mg, 81 mg, Oral, Daily, Young Contreras MD, 81 mg at 12/10/17 0854    ticagrelor (BRILINTA) tablet 90 mg,

## 2017-12-11 VITALS
SYSTOLIC BLOOD PRESSURE: 124 MMHG | OXYGEN SATURATION: 99 % | DIASTOLIC BLOOD PRESSURE: 64 MMHG | TEMPERATURE: 97.4 F | HEIGHT: 71 IN | WEIGHT: 315 LBS | HEART RATE: 68 BPM | RESPIRATION RATE: 14 BRPM | BODY MASS INDEX: 44.1 KG/M2

## 2017-12-11 LAB
ABSOLUTE EOS #: 0.37 K/UL (ref 0–0.44)
ABSOLUTE IMMATURE GRANULOCYTE: 0.05 K/UL (ref 0–0.3)
ABSOLUTE LYMPH #: 1.18 K/UL (ref 1.1–3.7)
ABSOLUTE MONO #: 1.13 K/UL (ref 0.1–1.2)
ANION GAP SERPL CALCULATED.3IONS-SCNC: 16 MMOL/L (ref 9–17)
BASOPHILS # BLD: 1 % (ref 0–2)
BASOPHILS ABSOLUTE: 0.05 K/UL (ref 0–0.2)
BUN BLDV-MCNC: 18 MG/DL (ref 8–23)
BUN/CREAT BLD: ABNORMAL (ref 9–20)
CALCIUM SERPL-MCNC: 8.5 MG/DL (ref 8.6–10.4)
CHLORIDE BLD-SCNC: 102 MMOL/L (ref 98–107)
CO2: 22 MMOL/L (ref 20–31)
CREAT SERPL-MCNC: 0.65 MG/DL (ref 0.7–1.2)
DIFFERENTIAL TYPE: ABNORMAL
EKG ATRIAL RATE: 61 BPM
EKG ATRIAL RATE: 62 BPM
EKG ATRIAL RATE: 90 BPM
EKG ATRIAL RATE: 91 BPM
EKG P AXIS: 37 DEGREES
EKG P AXIS: 63 DEGREES
EKG P AXIS: 64 DEGREES
EKG P AXIS: 67 DEGREES
EKG P-R INTERVAL: 172 MS
EKG P-R INTERVAL: 178 MS
EKG P-R INTERVAL: 180 MS
EKG P-R INTERVAL: 180 MS
EKG Q-T INTERVAL: 384 MS
EKG Q-T INTERVAL: 384 MS
EKG Q-T INTERVAL: 448 MS
EKG Q-T INTERVAL: 452 MS
EKG QRS DURATION: 100 MS
EKG QRS DURATION: 96 MS
EKG QTC CALCULATION (BAZETT): 454 MS
EKG QTC CALCULATION (BAZETT): 455 MS
EKG QTC CALCULATION (BAZETT): 469 MS
EKG QTC CALCULATION (BAZETT): 472 MS
EKG R AXIS: -39 DEGREES
EKG R AXIS: -49 DEGREES
EKG R AXIS: -59 DEGREES
EKG R AXIS: -63 DEGREES
EKG T AXIS: 110 DEGREES
EKG T AXIS: 114 DEGREES
EKG T AXIS: 134 DEGREES
EKG T AXIS: 99 DEGREES
EKG VENTRICULAR RATE: 61 BPM
EKG VENTRICULAR RATE: 62 BPM
EKG VENTRICULAR RATE: 90 BPM
EKG VENTRICULAR RATE: 91 BPM
EOSINOPHILS RELATIVE PERCENT: 3 % (ref 1–4)
GFR AFRICAN AMERICAN: >60 ML/MIN
GFR NON-AFRICAN AMERICAN: >60 ML/MIN
GFR SERPL CREATININE-BSD FRML MDRD: ABNORMAL ML/MIN/{1.73_M2}
GFR SERPL CREATININE-BSD FRML MDRD: ABNORMAL ML/MIN/{1.73_M2}
GLUCOSE BLD-MCNC: 145 MG/DL (ref 70–99)
HCT VFR BLD CALC: 44.1 % (ref 40.7–50.3)
HEMOGLOBIN: 14.5 G/DL (ref 13–17)
IMMATURE GRANULOCYTES: 1 %
LV EF: 53 %
LVEF MODALITY: NORMAL
LYMPHOCYTES # BLD: 11 % (ref 24–43)
MAGNESIUM: 2.1 MG/DL (ref 1.6–2.6)
MCH RBC QN AUTO: 28.5 PG (ref 25.2–33.5)
MCHC RBC AUTO-ENTMCNC: 32.9 G/DL (ref 28.4–34.8)
MCV RBC AUTO: 86.8 FL (ref 82.6–102.9)
MONOCYTES # BLD: 11 % (ref 3–12)
PDW BLD-RTO: 13.6 % (ref 11.8–14.4)
PLATELET # BLD: 186 K/UL (ref 138–453)
PLATELET ESTIMATE: ABNORMAL
PMV BLD AUTO: 10.7 FL (ref 8.1–13.5)
POTASSIUM SERPL-SCNC: 4.2 MMOL/L (ref 3.7–5.3)
RBC # BLD: 5.08 M/UL (ref 4.21–5.77)
RBC # BLD: ABNORMAL 10*6/UL
SEG NEUTROPHILS: 73 % (ref 36–65)
SEGMENTED NEUTROPHILS ABSOLUTE COUNT: 7.98 K/UL (ref 1.5–8.1)
SODIUM BLD-SCNC: 140 MMOL/L (ref 135–144)
TROPONIN INTERP: ABNORMAL
TROPONIN T: 1.31 NG/ML
WBC # BLD: 10.8 K/UL (ref 3.5–11.3)
WBC # BLD: ABNORMAL 10*3/UL

## 2017-12-11 PROCEDURE — 36415 COLL VENOUS BLD VENIPUNCTURE: CPT

## 2017-12-11 PROCEDURE — 94640 AIRWAY INHALATION TREATMENT: CPT

## 2017-12-11 PROCEDURE — 93306 TTE W/DOPPLER COMPLETE: CPT

## 2017-12-11 PROCEDURE — 85025 COMPLETE CBC W/AUTO DIFF WBC: CPT

## 2017-12-11 PROCEDURE — 83735 ASSAY OF MAGNESIUM: CPT

## 2017-12-11 PROCEDURE — 6370000000 HC RX 637 (ALT 250 FOR IP): Performed by: INTERNAL MEDICINE

## 2017-12-11 PROCEDURE — 94762 N-INVAS EAR/PLS OXIMTRY CONT: CPT

## 2017-12-11 PROCEDURE — 2580000003 HC RX 258: Performed by: INTERNAL MEDICINE

## 2017-12-11 PROCEDURE — 84484 ASSAY OF TROPONIN QUANT: CPT

## 2017-12-11 PROCEDURE — 80048 BASIC METABOLIC PNL TOTAL CA: CPT

## 2017-12-11 PROCEDURE — 93005 ELECTROCARDIOGRAM TRACING: CPT

## 2017-12-11 RX ORDER — ATORVASTATIN CALCIUM 80 MG/1
80 TABLET, FILM COATED ORAL NIGHTLY
Qty: 30 TABLET | Refills: 3 | Status: SHIPPED | OUTPATIENT
Start: 2017-12-11

## 2017-12-11 RX ORDER — LISINOPRIL 10 MG/1
10 TABLET ORAL DAILY
Qty: 30 TABLET | Refills: 3 | Status: SHIPPED | OUTPATIENT
Start: 2017-12-12 | End: 2022-05-18 | Stop reason: SDUPTHER

## 2017-12-11 RX ORDER — ASPIRIN 81 MG/1
81 TABLET, CHEWABLE ORAL DAILY
Qty: 30 TABLET | Refills: 3 | Status: SHIPPED | OUTPATIENT
Start: 2017-12-12

## 2017-12-11 RX ORDER — METOPROLOL SUCCINATE 50 MG/1
50 TABLET, EXTENDED RELEASE ORAL DAILY
Qty: 30 TABLET | Refills: 3 | Status: SHIPPED | OUTPATIENT
Start: 2017-12-11 | End: 2020-06-01 | Stop reason: SDUPTHER

## 2017-12-11 RX ADMIN — MOMETASONE FUROATE AND FORMOTEROL FUMARATE DIHYDRATE 2 PUFF: 200; 5 AEROSOL RESPIRATORY (INHALATION) at 08:34

## 2017-12-11 RX ADMIN — CITALOPRAM 40 MG: 20 TABLET, FILM COATED ORAL at 08:45

## 2017-12-11 RX ADMIN — LISINOPRIL 10 MG: 10 TABLET ORAL at 08:45

## 2017-12-11 RX ADMIN — TICAGRELOR 90 MG: 90 TABLET ORAL at 08:44

## 2017-12-11 RX ADMIN — METOPROLOL TARTRATE 25 MG: 25 TABLET ORAL at 08:44

## 2017-12-11 RX ADMIN — Medication 10 ML: at 08:46

## 2017-12-11 RX ADMIN — ASPIRIN 81 MG: 81 TABLET, CHEWABLE ORAL at 08:45

## 2017-12-11 NOTE — DISCHARGE SUMMARY
ELLIPTA) 200-25 MCG/INH AEPB  Inhale 200 mcg into the lungs daily             lisinopril (PRINIVIL;ZESTRIL) 10 MG tablet  Take 1 tablet by mouth daily             metoprolol succinate (TOPROL XL) 50 MG extended release tablet  Take 1 tablet by mouth daily             montelukast (SINGULAIR) 10 MG tablet  Take 10 mg by mouth nightly             ticagrelor (BRILINTA) 90 MG TABS tablet  Take 1 tablet by mouth 2 times daily                    Discussed with patient and nursing. Medications and discharge instructions reviewed with patient and nursing. Patient counseled in detail regarding medication compliance and risk factor modification. Electronically signed by Maranda Zarate MD on 12/11/2017 at 9:22 AM  Fellow, [facility] [address]    Attending note,   The patient was seen and examined, agree with above, doing well and feeling much better, no chest pain or SOB, asymptomatic, will follow on echo, ok to D/C home today and will follow with Dr. Ochoa Finley as outpatient. Instructed about the importance of taking his medications.

## 2017-12-11 NOTE — PROGRESS NOTES
Smoking Cessation - topics covered   []  Health Risks  []  Benefits of Quitting   []  Smoking Cessation  []  Patient has no history of tobacco use  [x]  Patient is former smoker. Patient quit in 1975. [x]  No need for tobacco cessation education. []  Booklet given  []  Patient verbalizes understanding. []  Patient denies need for tobacco cessation education.   Cynthia Sahni  9:07 AM

## 2017-12-11 NOTE — PLAN OF CARE
Problem: Respiratory  Intervention: Respiratory assessment  PRN FOR BRONCHOSPASM/BRONCHOCONSTRICTION     [x]         IMPROVE AERATION/BREATH SOUNDS  [x]   ADMINISTER BRONCHODILATOR THERAPY AS APPROPRIATE  [x]   ASSESS BREATH SOUNDS  []   IMPLEMENT AEROSOL/MDI PROTOCOL  [x]   PATIENT EDUCATION AS NEEDED    PROVIDE ADEQUATE OXYGENATION WITH ACCEPTABLE SP02/ABG'S    [x]  IDENTIFY APPROPRIATE OXYGEN THERAPY  [x]   MONITOR SP02/ABG'S AS NEEDED   [x]   PATIENT EDUCATION AS NEEDED

## 2018-01-06 NOTE — PLAN OF CARE
BRONCHOSPASM/BRONCHOCONSTRICTION     [x]         IMPROVE AERATION/BREATH SOUNDS  [x]   ADMINISTER BRONCHODILATOR THERAPY AS APPROPRIATE  [x]   ASSESS BREATH SOUNDS  []   IMPLEMENT AEROSOL/MDI PROTOCOL  [x]   PATIENT EDUCATION AS NEEDED unknown

## 2018-01-22 ENCOUNTER — HOSPITAL ENCOUNTER (OUTPATIENT)
Dept: CARDIAC REHAB | Age: 68
Setting detail: THERAPIES SERIES
Discharge: HOME | End: 2018-01-22
Payer: MEDICARE

## 2018-01-22 VITALS — BODY MASS INDEX: 43.97 KG/M2 | WEIGHT: 314.1 LBS | HEIGHT: 71 IN

## 2018-01-22 PROCEDURE — 93798 PHYS/QHP OP CAR RHAB W/ECG: CPT

## 2018-01-22 RX ORDER — MELOXICAM 15 MG/1
15 TABLET ORAL DAILY
COMMUNITY
End: 2020-06-01 | Stop reason: SDUPTHER

## 2018-01-22 RX ORDER — NITROGLYCERIN 0.4 MG/1
0.4 TABLET SUBLINGUAL EVERY 5 MIN PRN
COMMUNITY
End: 2021-05-10 | Stop reason: SDUPTHER

## 2018-01-22 ASSESSMENT — PATIENT HEALTH QUESTIONNAIRE - PHQ9: SUM OF ALL RESPONSES TO PHQ QUESTIONS 1-9: 0

## 2018-01-24 ENCOUNTER — HOSPITAL ENCOUNTER (OUTPATIENT)
Dept: CARDIAC REHAB | Age: 68
Setting detail: THERAPIES SERIES
Discharge: HOME | End: 2018-01-24
Payer: MEDICARE

## 2018-01-26 ENCOUNTER — HOSPITAL ENCOUNTER (OUTPATIENT)
Dept: CARDIAC REHAB | Age: 68
Setting detail: THERAPIES SERIES
Discharge: HOME | End: 2018-01-26
Payer: MEDICARE

## 2018-01-26 VITALS — BODY MASS INDEX: 43.38 KG/M2 | WEIGHT: 311 LBS

## 2018-01-26 PROCEDURE — 93798 PHYS/QHP OP CAR RHAB W/ECG: CPT

## 2018-01-29 ENCOUNTER — APPOINTMENT (OUTPATIENT)
Dept: CARDIAC REHAB | Age: 68
End: 2018-01-29
Payer: MEDICARE

## 2018-01-31 ENCOUNTER — HOSPITAL ENCOUNTER (OUTPATIENT)
Dept: CARDIAC REHAB | Age: 68
Setting detail: THERAPIES SERIES
Discharge: HOME | End: 2018-01-31
Payer: MEDICARE

## 2018-01-31 VITALS — WEIGHT: 311.1 LBS | BODY MASS INDEX: 43.39 KG/M2

## 2018-01-31 PROCEDURE — 93798 PHYS/QHP OP CAR RHAB W/ECG: CPT

## 2018-02-02 ENCOUNTER — HOSPITAL ENCOUNTER (OUTPATIENT)
Dept: CARDIAC REHAB | Age: 68
Setting detail: THERAPIES SERIES
Discharge: HOME | End: 2018-02-02
Payer: MEDICARE

## 2018-02-02 VITALS — WEIGHT: 311.1 LBS | BODY MASS INDEX: 43.39 KG/M2

## 2018-02-02 PROCEDURE — 93798 PHYS/QHP OP CAR RHAB W/ECG: CPT

## 2018-02-05 ENCOUNTER — HOSPITAL ENCOUNTER (OUTPATIENT)
Dept: CARDIAC REHAB | Age: 68
Setting detail: THERAPIES SERIES
Discharge: HOME | End: 2018-02-05
Payer: MEDICARE

## 2018-02-05 VITALS — WEIGHT: 311.3 LBS | BODY MASS INDEX: 43.42 KG/M2

## 2018-02-05 PROCEDURE — 93798 PHYS/QHP OP CAR RHAB W/ECG: CPT

## 2018-02-07 ENCOUNTER — HOSPITAL ENCOUNTER (OUTPATIENT)
Dept: CARDIAC REHAB | Age: 68
Setting detail: THERAPIES SERIES
Discharge: HOME | End: 2018-02-07
Payer: MEDICARE

## 2018-02-09 ENCOUNTER — APPOINTMENT (OUTPATIENT)
Dept: CARDIAC REHAB | Age: 68
End: 2018-02-09
Payer: MEDICARE

## 2018-02-12 ENCOUNTER — HOSPITAL ENCOUNTER (OUTPATIENT)
Dept: CARDIAC REHAB | Age: 68
Setting detail: THERAPIES SERIES
Discharge: HOME | End: 2018-02-12
Payer: MEDICARE

## 2018-02-12 VITALS — WEIGHT: 309.3 LBS | BODY MASS INDEX: 43.14 KG/M2

## 2018-02-12 PROCEDURE — 93798 PHYS/QHP OP CAR RHAB W/ECG: CPT

## 2018-02-14 ENCOUNTER — HOSPITAL ENCOUNTER (OUTPATIENT)
Dept: CARDIAC REHAB | Age: 68
Setting detail: THERAPIES SERIES
Discharge: HOME | End: 2018-02-14
Payer: MEDICARE

## 2018-02-14 VITALS — BODY MASS INDEX: 43.05 KG/M2 | WEIGHT: 308.7 LBS

## 2018-02-14 PROCEDURE — 93798 PHYS/QHP OP CAR RHAB W/ECG: CPT

## 2018-02-16 ENCOUNTER — HOSPITAL ENCOUNTER (OUTPATIENT)
Dept: CARDIAC REHAB | Age: 68
Setting detail: THERAPIES SERIES
Discharge: HOME | End: 2018-02-16
Payer: MEDICARE

## 2018-02-16 VITALS — BODY MASS INDEX: 43.12 KG/M2 | WEIGHT: 309.2 LBS

## 2018-02-16 PROCEDURE — 93798 PHYS/QHP OP CAR RHAB W/ECG: CPT

## 2018-02-19 ENCOUNTER — HOSPITAL ENCOUNTER (OUTPATIENT)
Dept: CARDIAC REHAB | Age: 68
Setting detail: THERAPIES SERIES
Discharge: HOME | End: 2018-02-19
Payer: MEDICARE

## 2018-02-19 VITALS — BODY MASS INDEX: 43.35 KG/M2 | WEIGHT: 310.8 LBS

## 2018-02-19 PROCEDURE — 93798 PHYS/QHP OP CAR RHAB W/ECG: CPT

## 2018-02-21 ENCOUNTER — HOSPITAL ENCOUNTER (OUTPATIENT)
Dept: CARDIAC REHAB | Age: 68
Setting detail: THERAPIES SERIES
Discharge: HOME | End: 2018-02-21
Payer: MEDICARE

## 2018-02-21 VITALS — BODY MASS INDEX: 43.5 KG/M2 | WEIGHT: 311.9 LBS

## 2018-02-21 PROCEDURE — 93798 PHYS/QHP OP CAR RHAB W/ECG: CPT

## 2018-02-23 ENCOUNTER — HOSPITAL ENCOUNTER (OUTPATIENT)
Dept: CARDIAC REHAB | Age: 68
Setting detail: THERAPIES SERIES
Discharge: HOME | End: 2018-02-23
Payer: MEDICARE

## 2018-02-23 VITALS — WEIGHT: 308.8 LBS | BODY MASS INDEX: 43.07 KG/M2

## 2018-02-23 PROCEDURE — 93798 PHYS/QHP OP CAR RHAB W/ECG: CPT

## 2018-02-26 ENCOUNTER — HOSPITAL ENCOUNTER (OUTPATIENT)
Dept: CARDIAC REHAB | Age: 68
Setting detail: THERAPIES SERIES
Discharge: HOME | End: 2018-02-26
Payer: MEDICARE

## 2018-02-26 VITALS — WEIGHT: 309.1 LBS | BODY MASS INDEX: 43.11 KG/M2

## 2018-02-26 PROCEDURE — 93798 PHYS/QHP OP CAR RHAB W/ECG: CPT

## 2018-02-28 ENCOUNTER — HOSPITAL ENCOUNTER (OUTPATIENT)
Dept: CARDIAC REHAB | Age: 68
Setting detail: THERAPIES SERIES
Discharge: HOME | End: 2018-02-28
Payer: MEDICARE

## 2018-02-28 VITALS — BODY MASS INDEX: 43.07 KG/M2 | WEIGHT: 308.8 LBS

## 2018-02-28 PROCEDURE — 93798 PHYS/QHP OP CAR RHAB W/ECG: CPT

## 2018-02-28 NOTE — PROGRESS NOTES
NUTRITION NOTE-CARDIAC REHABILITATION    Pt's wife received nutritional counseling as a component of Cardiac Rehabilitation. Handouts provided regarding a cardiac diet, Weight Loss Tips, and Weight Management Cooking Tips. Pt joined the session at the end and asked a few questions that were answered. Pt has lost 12 lb since heart attack and hopes to gradually lose more wt. Pt and wife appeared receptive to information provided. Name and Office phone number provided for reference. Kg Delgado R.D., L.D.   Pager: [phone]

## 2018-03-02 ENCOUNTER — HOSPITAL ENCOUNTER (OUTPATIENT)
Dept: CARDIAC REHAB | Age: 68
Setting detail: THERAPIES SERIES
Discharge: HOME | End: 2018-03-02
Payer: MEDICARE

## 2018-03-02 VITALS — WEIGHT: 307.5 LBS | BODY MASS INDEX: 42.89 KG/M2

## 2018-03-02 PROCEDURE — 93798 PHYS/QHP OP CAR RHAB W/ECG: CPT

## 2018-03-05 ENCOUNTER — HOSPITAL ENCOUNTER (OUTPATIENT)
Dept: CARDIAC REHAB | Age: 68
Setting detail: THERAPIES SERIES
Discharge: HOME | End: 2018-03-05
Payer: MEDICARE

## 2018-03-05 VITALS — BODY MASS INDEX: 43.1 KG/M2 | WEIGHT: 309 LBS

## 2018-03-05 PROCEDURE — 93798 PHYS/QHP OP CAR RHAB W/ECG: CPT

## 2018-03-07 ENCOUNTER — HOSPITAL ENCOUNTER (OUTPATIENT)
Dept: CARDIAC REHAB | Age: 68
Setting detail: THERAPIES SERIES
Discharge: HOME | End: 2018-03-07
Payer: MEDICARE

## 2018-03-07 VITALS — WEIGHT: 307.6 LBS | BODY MASS INDEX: 42.9 KG/M2

## 2018-03-07 PROCEDURE — 93798 PHYS/QHP OP CAR RHAB W/ECG: CPT

## 2018-03-09 ENCOUNTER — HOSPITAL ENCOUNTER (OUTPATIENT)
Dept: CARDIAC REHAB | Age: 68
Setting detail: THERAPIES SERIES
Discharge: HOME | End: 2018-03-09
Payer: MEDICARE

## 2018-03-09 VITALS — WEIGHT: 307.3 LBS | BODY MASS INDEX: 42.86 KG/M2

## 2018-03-09 PROCEDURE — 93798 PHYS/QHP OP CAR RHAB W/ECG: CPT

## 2018-03-12 ENCOUNTER — HOSPITAL ENCOUNTER (OUTPATIENT)
Dept: CARDIAC REHAB | Age: 68
Setting detail: THERAPIES SERIES
Discharge: HOME | End: 2018-03-12
Payer: MEDICARE

## 2018-03-14 ENCOUNTER — HOSPITAL ENCOUNTER (OUTPATIENT)
Dept: CARDIAC REHAB | Age: 68
Setting detail: THERAPIES SERIES
Discharge: HOME | End: 2018-03-14
Payer: MEDICARE

## 2018-03-14 VITALS — BODY MASS INDEX: 43.03 KG/M2 | WEIGHT: 308.5 LBS

## 2018-03-14 PROCEDURE — 93798 PHYS/QHP OP CAR RHAB W/ECG: CPT

## 2018-03-16 ENCOUNTER — HOSPITAL ENCOUNTER (OUTPATIENT)
Dept: CARDIAC REHAB | Age: 68
Setting detail: THERAPIES SERIES
Discharge: HOME | End: 2018-03-16
Payer: MEDICARE

## 2018-03-16 VITALS — WEIGHT: 307.7 LBS | BODY MASS INDEX: 42.92 KG/M2

## 2018-03-16 PROCEDURE — 93798 PHYS/QHP OP CAR RHAB W/ECG: CPT

## 2018-03-19 ENCOUNTER — HOSPITAL ENCOUNTER (OUTPATIENT)
Dept: CARDIAC REHAB | Age: 68
Setting detail: THERAPIES SERIES
Discharge: HOME | End: 2018-03-19
Payer: MEDICARE

## 2018-03-19 VITALS — WEIGHT: 308.7 LBS | BODY MASS INDEX: 43.05 KG/M2

## 2018-03-19 PROCEDURE — 93798 PHYS/QHP OP CAR RHAB W/ECG: CPT

## 2018-03-19 NOTE — PROGRESS NOTES
OVERALL FEELS HE IS DOING MUCH BETTER. A LITTLE MORE SOB TODAY BUT PULSE OX 96%. TOOK HIS RESCUE INHALER ON THE WAY IN.

## 2018-03-21 ENCOUNTER — HOSPITAL ENCOUNTER (OUTPATIENT)
Dept: CARDIAC REHAB | Age: 68
Setting detail: THERAPIES SERIES
Discharge: HOME | End: 2018-03-21
Payer: MEDICARE

## 2018-03-21 VITALS — WEIGHT: 305.7 LBS | BODY MASS INDEX: 42.64 KG/M2

## 2018-03-21 PROCEDURE — 93798 PHYS/QHP OP CAR RHAB W/ECG: CPT

## 2018-03-23 ENCOUNTER — HOSPITAL ENCOUNTER (OUTPATIENT)
Dept: CARDIAC REHAB | Age: 68
Setting detail: THERAPIES SERIES
Discharge: HOME | End: 2018-03-23
Payer: MEDICARE

## 2018-03-23 VITALS — HEIGHT: 70 IN | WEIGHT: 307 LBS | BODY MASS INDEX: 43.95 KG/M2

## 2018-03-23 PROCEDURE — 93798 PHYS/QHP OP CAR RHAB W/ECG: CPT

## 2018-03-26 ENCOUNTER — HOSPITAL ENCOUNTER (OUTPATIENT)
Dept: CARDIAC REHAB | Age: 68
Setting detail: THERAPIES SERIES
Discharge: HOME | End: 2018-03-26
Payer: MEDICARE

## 2018-03-26 VITALS — WEIGHT: 308 LBS | BODY MASS INDEX: 44.19 KG/M2

## 2018-03-26 PROCEDURE — 93798 PHYS/QHP OP CAR RHAB W/ECG: CPT

## 2018-03-28 ENCOUNTER — HOSPITAL ENCOUNTER (OUTPATIENT)
Dept: CARDIAC REHAB | Age: 68
Setting detail: THERAPIES SERIES
Discharge: HOME | End: 2018-03-28
Payer: MEDICARE

## 2018-03-28 VITALS — WEIGHT: 305 LBS | BODY MASS INDEX: 43.76 KG/M2

## 2018-03-28 PROCEDURE — 93798 PHYS/QHP OP CAR RHAB W/ECG: CPT

## 2018-03-30 ENCOUNTER — APPOINTMENT (OUTPATIENT)
Dept: CARDIAC REHAB | Age: 68
End: 2018-03-30
Payer: MEDICARE

## 2018-04-02 ENCOUNTER — HOSPITAL ENCOUNTER (OUTPATIENT)
Dept: CARDIAC REHAB | Age: 68
Setting detail: THERAPIES SERIES
Discharge: HOME | End: 2018-04-02
Payer: MEDICARE

## 2018-04-02 VITALS — WEIGHT: 307.6 LBS | BODY MASS INDEX: 44.14 KG/M2

## 2018-04-02 PROCEDURE — 93798 PHYS/QHP OP CAR RHAB W/ECG: CPT

## 2018-04-04 ENCOUNTER — HOSPITAL ENCOUNTER (OUTPATIENT)
Dept: CARDIAC REHAB | Age: 68
Setting detail: THERAPIES SERIES
Discharge: HOME | End: 2018-04-04
Payer: MEDICARE

## 2018-04-04 VITALS — WEIGHT: 309.1 LBS | BODY MASS INDEX: 44.35 KG/M2

## 2018-04-04 PROCEDURE — 93798 PHYS/QHP OP CAR RHAB W/ECG: CPT

## 2018-04-06 ENCOUNTER — APPOINTMENT (OUTPATIENT)
Dept: CARDIAC REHAB | Age: 68
End: 2018-04-06
Payer: MEDICARE

## 2018-04-09 ENCOUNTER — HOSPITAL ENCOUNTER (OUTPATIENT)
Dept: CARDIAC REHAB | Age: 68
Setting detail: THERAPIES SERIES
Discharge: HOME | End: 2018-04-09
Payer: MEDICARE

## 2018-04-09 VITALS — WEIGHT: 310.2 LBS | BODY MASS INDEX: 44.51 KG/M2

## 2018-04-09 PROCEDURE — 93798 PHYS/QHP OP CAR RHAB W/ECG: CPT

## 2018-04-11 ENCOUNTER — HOSPITAL ENCOUNTER (OUTPATIENT)
Dept: CARDIAC REHAB | Age: 68
Setting detail: THERAPIES SERIES
Discharge: HOME | End: 2018-04-11
Payer: MEDICARE

## 2018-04-11 VITALS — BODY MASS INDEX: 44.08 KG/M2 | WEIGHT: 307.2 LBS

## 2018-04-11 PROCEDURE — 93798 PHYS/QHP OP CAR RHAB W/ECG: CPT

## 2018-04-13 ENCOUNTER — HOSPITAL ENCOUNTER (OUTPATIENT)
Dept: CARDIAC REHAB | Age: 68
Setting detail: THERAPIES SERIES
Discharge: HOME | End: 2018-04-13
Payer: MEDICARE

## 2018-04-13 VITALS — WEIGHT: 307.7 LBS | BODY MASS INDEX: 44.15 KG/M2

## 2018-04-13 PROCEDURE — 93798 PHYS/QHP OP CAR RHAB W/ECG: CPT

## 2018-04-16 ENCOUNTER — HOSPITAL ENCOUNTER (OUTPATIENT)
Dept: CARDIAC REHAB | Age: 68
Setting detail: THERAPIES SERIES
Discharge: HOME | End: 2018-04-16
Payer: MEDICARE

## 2018-04-16 VITALS — BODY MASS INDEX: 44.34 KG/M2 | WEIGHT: 309 LBS

## 2018-04-16 PROCEDURE — 93798 PHYS/QHP OP CAR RHAB W/ECG: CPT

## 2018-04-18 ENCOUNTER — HOSPITAL ENCOUNTER (OUTPATIENT)
Dept: CARDIAC REHAB | Age: 68
Setting detail: THERAPIES SERIES
Discharge: HOME | End: 2018-04-18
Payer: MEDICARE

## 2018-04-18 VITALS — BODY MASS INDEX: 43.82 KG/M2 | WEIGHT: 305.4 LBS

## 2018-04-18 PROCEDURE — 93798 PHYS/QHP OP CAR RHAB W/ECG: CPT

## 2018-04-20 ENCOUNTER — HOSPITAL ENCOUNTER (OUTPATIENT)
Dept: CARDIAC REHAB | Age: 68
Setting detail: THERAPIES SERIES
Discharge: HOME | End: 2018-04-20
Payer: MEDICARE

## 2018-04-20 VITALS — BODY MASS INDEX: 44.09 KG/M2 | WEIGHT: 307.3 LBS

## 2018-04-20 PROCEDURE — 93798 PHYS/QHP OP CAR RHAB W/ECG: CPT

## 2018-04-23 ENCOUNTER — HOSPITAL ENCOUNTER (OUTPATIENT)
Dept: CARDIAC REHAB | Age: 68
Setting detail: THERAPIES SERIES
Discharge: HOME | End: 2018-04-23
Payer: MEDICARE

## 2018-04-23 VITALS — WEIGHT: 310 LBS | BODY MASS INDEX: 44.48 KG/M2

## 2018-04-23 PROCEDURE — 93798 PHYS/QHP OP CAR RHAB W/ECG: CPT

## 2018-04-25 ENCOUNTER — HOSPITAL ENCOUNTER (OUTPATIENT)
Dept: CARDIAC REHAB | Age: 68
Setting detail: THERAPIES SERIES
Discharge: HOME | End: 2018-04-25
Payer: MEDICARE

## 2018-04-25 VITALS — BODY MASS INDEX: 44.44 KG/M2 | WEIGHT: 309.7 LBS

## 2018-04-25 PROCEDURE — 93798 PHYS/QHP OP CAR RHAB W/ECG: CPT

## 2018-04-27 ENCOUNTER — HOSPITAL ENCOUNTER (OUTPATIENT)
Dept: CARDIAC REHAB | Age: 68
Setting detail: THERAPIES SERIES
Discharge: HOME | End: 2018-04-27
Payer: MEDICARE

## 2018-04-27 VITALS — WEIGHT: 308.7 LBS | BODY MASS INDEX: 44.29 KG/M2

## 2018-04-27 PROCEDURE — 93798 PHYS/QHP OP CAR RHAB W/ECG: CPT

## 2018-04-30 ENCOUNTER — HOSPITAL ENCOUNTER (OUTPATIENT)
Dept: CARDIAC REHAB | Age: 68
Setting detail: THERAPIES SERIES
Discharge: HOME | End: 2018-04-30
Payer: MEDICARE

## 2018-04-30 VITALS — BODY MASS INDEX: 43.26 KG/M2 | HEIGHT: 71 IN | WEIGHT: 309 LBS

## 2018-04-30 PROCEDURE — 93798 PHYS/QHP OP CAR RHAB W/ECG: CPT

## 2018-04-30 ASSESSMENT — PATIENT HEALTH QUESTIONNAIRE - PHQ9: SUM OF ALL RESPONSES TO PHQ QUESTIONS 1-9: 0

## 2018-08-29 ENCOUNTER — HOSPITAL ENCOUNTER (OUTPATIENT)
Dept: CT IMAGING | Age: 68
Discharge: HOME | End: 2018-08-31
Payer: MEDICARE

## 2018-08-29 DIAGNOSIS — J62.8 SILICOSIS (HCC): ICD-10-CM

## 2018-08-29 PROCEDURE — 71250 CT THORAX DX C-: CPT

## 2019-07-23 ENCOUNTER — TELEPHONE (OUTPATIENT)
Dept: PHARMACY | Facility: CLINIC | Age: 69
End: 2019-07-23

## 2019-08-07 ENCOUNTER — CARE COORDINATION (OUTPATIENT)
Dept: CARE COORDINATION | Age: 69
End: 2019-08-07

## 2019-08-12 ENCOUNTER — CARE COORDINATION (OUTPATIENT)
Dept: CARE COORDINATION | Age: 69
End: 2019-08-12

## 2019-08-15 ENCOUNTER — CARE COORDINATION (OUTPATIENT)
Dept: CARE COORDINATION | Age: 69
End: 2019-08-15

## 2019-10-17 ENCOUNTER — CARE COORDINATION (OUTPATIENT)
Dept: CARE COORDINATION | Age: 69
End: 2019-10-17

## 2020-01-30 ENCOUNTER — HOSPITAL ENCOUNTER (EMERGENCY)
Age: 70
Discharge: HOME | End: 2020-01-30
Attending: EMERGENCY MEDICINE
Payer: COMMERCIAL

## 2020-01-30 ENCOUNTER — APPOINTMENT (OUTPATIENT)
Dept: GENERAL RADIOLOGY | Age: 70
End: 2020-01-30
Payer: COMMERCIAL

## 2020-01-30 VITALS
HEART RATE: 76 BPM | OXYGEN SATURATION: 93 % | WEIGHT: 315 LBS | SYSTOLIC BLOOD PRESSURE: 121 MMHG | TEMPERATURE: 98.1 F | HEIGHT: 71 IN | RESPIRATION RATE: 16 BRPM | BODY MASS INDEX: 44.1 KG/M2 | DIASTOLIC BLOOD PRESSURE: 60 MMHG

## 2020-01-30 LAB
ABSOLUTE EOS #: 0.2 K/UL (ref 0–0.4)
ABSOLUTE IMMATURE GRANULOCYTE: ABNORMAL K/UL (ref 0–0.3)
ABSOLUTE LYMPH #: 0.9 K/UL (ref 1–4.8)
ABSOLUTE MONO #: 1 K/UL (ref 0.1–1.3)
ALBUMIN SERPL-MCNC: 3.5 G/DL (ref 3.5–5.2)
ALBUMIN/GLOBULIN RATIO: ABNORMAL (ref 1–2.5)
ALP BLD-CCNC: 332 U/L (ref 40–129)
ALT SERPL-CCNC: 24 U/L (ref 5–41)
ANION GAP SERPL CALCULATED.3IONS-SCNC: 10 MMOL/L (ref 9–17)
AST SERPL-CCNC: 14 U/L
BASOPHILS # BLD: 1 % (ref 0–2)
BASOPHILS ABSOLUTE: 0.1 K/UL (ref 0–0.2)
BILIRUB SERPL-MCNC: 0.48 MG/DL (ref 0.3–1.2)
BILIRUBIN URINE: NEGATIVE
BNP INTERPRETATION: NORMAL
BUN BLDV-MCNC: 17 MG/DL (ref 8–23)
BUN/CREAT BLD: ABNORMAL (ref 9–20)
CALCIUM SERPL-MCNC: 8.6 MG/DL (ref 8.6–10.4)
CHLORIDE BLD-SCNC: 100 MMOL/L (ref 98–107)
CO2: 23 MMOL/L (ref 20–31)
COLOR: YELLOW
COMMENT UA: ABNORMAL
CREAT SERPL-MCNC: 0.88 MG/DL (ref 0.7–1.2)
D-DIMER QUANTITATIVE: 0.65 MG/L FEU (ref 0–0.59)
DIFFERENTIAL TYPE: ABNORMAL
EOSINOPHILS RELATIVE PERCENT: 2 % (ref 0–4)
GFR AFRICAN AMERICAN: >60 ML/MIN
GFR NON-AFRICAN AMERICAN: >60 ML/MIN
GFR SERPL CREATININE-BSD FRML MDRD: ABNORMAL ML/MIN/{1.73_M2}
GFR SERPL CREATININE-BSD FRML MDRD: ABNORMAL ML/MIN/{1.73_M2}
GLUCOSE BLD-MCNC: 280 MG/DL (ref 70–99)
GLUCOSE URINE: ABNORMAL
HCT VFR BLD CALC: 41 % (ref 41–53)
HEMOGLOBIN: 13.9 G/DL (ref 13.5–17.5)
IMMATURE GRANULOCYTES: ABNORMAL %
INR BLD: 1
KETONES, URINE: NEGATIVE
LEUKOCYTE ESTERASE, URINE: NEGATIVE
LYMPHOCYTES # BLD: 9 % (ref 24–44)
MCH RBC QN AUTO: 28.1 PG (ref 26–34)
MCHC RBC AUTO-ENTMCNC: 33.9 G/DL (ref 31–37)
MCV RBC AUTO: 82.9 FL (ref 80–100)
MONOCYTES # BLD: 10 % (ref 1–7)
NITRITE, URINE: NEGATIVE
NRBC AUTOMATED: ABNORMAL PER 100 WBC
PARTIAL THROMBOPLASTIN TIME: 31.3 SEC (ref 24–36)
PDW BLD-RTO: 15.6 % (ref 11.5–14.9)
PH UA: 5.5 (ref 5–8)
PLATELET # BLD: 192 K/UL (ref 150–450)
PLATELET ESTIMATE: ABNORMAL
PMV BLD AUTO: 8.1 FL (ref 6–12)
POTASSIUM SERPL-SCNC: 4.2 MMOL/L (ref 3.7–5.3)
PRO-BNP: 288 PG/ML
PROTEIN UA: NEGATIVE
PROTHROMBIN TIME: 13 SEC (ref 11.8–14.6)
RBC # BLD: 4.95 M/UL (ref 4.5–5.9)
RBC # BLD: ABNORMAL 10*6/UL
SEG NEUTROPHILS: 78 % (ref 36–66)
SEGMENTED NEUTROPHILS ABSOLUTE COUNT: 7.9 K/UL (ref 1.3–9.1)
SODIUM BLD-SCNC: 133 MMOL/L (ref 135–144)
SPECIFIC GRAVITY UA: 1.02 (ref 1–1.03)
TOTAL PROTEIN: 6.6 G/DL (ref 6.4–8.3)
TROPONIN INTERP: NORMAL
TROPONIN INTERP: NORMAL
TROPONIN T: NORMAL NG/ML
TROPONIN T: NORMAL NG/ML
TROPONIN, HIGH SENSITIVITY: 18 NG/L (ref 0–22)
TROPONIN, HIGH SENSITIVITY: 21 NG/L (ref 0–22)
TSH SERPL DL<=0.05 MIU/L-ACNC: 1.09 MIU/L (ref 0.3–5)
TURBIDITY: CLEAR
URINE HGB: NEGATIVE
UROBILINOGEN, URINE: NORMAL
WBC # BLD: 10.1 K/UL (ref 3.5–11)
WBC # BLD: ABNORMAL 10*3/UL

## 2020-01-30 PROCEDURE — 99285 EMERGENCY DEPT VISIT HI MDM: CPT

## 2020-01-30 PROCEDURE — 36415 COLL VENOUS BLD VENIPUNCTURE: CPT

## 2020-01-30 PROCEDURE — 93005 ELECTROCARDIOGRAM TRACING: CPT | Performed by: EMERGENCY MEDICINE

## 2020-01-30 PROCEDURE — 85379 FIBRIN DEGRADATION QUANT: CPT

## 2020-01-30 PROCEDURE — 85025 COMPLETE CBC W/AUTO DIFF WBC: CPT

## 2020-01-30 PROCEDURE — 80053 COMPREHEN METABOLIC PANEL: CPT

## 2020-01-30 PROCEDURE — 81003 URINALYSIS AUTO W/O SCOPE: CPT

## 2020-01-30 PROCEDURE — 85730 THROMBOPLASTIN TIME PARTIAL: CPT

## 2020-01-30 PROCEDURE — 84443 ASSAY THYROID STIM HORMONE: CPT

## 2020-01-30 PROCEDURE — 83880 ASSAY OF NATRIURETIC PEPTIDE: CPT

## 2020-01-30 PROCEDURE — 71046 X-RAY EXAM CHEST 2 VIEWS: CPT

## 2020-01-30 PROCEDURE — 84484 ASSAY OF TROPONIN QUANT: CPT

## 2020-01-30 PROCEDURE — 85610 PROTHROMBIN TIME: CPT

## 2020-01-30 ASSESSMENT — ENCOUNTER SYMPTOMS
NAUSEA: 0
EYE PAIN: 0
COUGH: 0
BACK PAIN: 0
VOMITING: 0
CONSTIPATION: 0
DIARRHEA: 0
SORE THROAT: 0
SHORTNESS OF BREATH: 0
ABDOMINAL PAIN: 0
CHEST TIGHTNESS: 0

## 2020-01-30 NOTE — ED PROVIDER NOTES
[facility] ED  Emergency Department Encounter  Emergency Medicine Attending Note     Pt Name: Lashonda Massey  MRN: [id number]  [place] 1950   Date of evaluation: 1/30/2020  PCP:  Chris Driscoll DO    CHIEF COMPLAINT       Chief Complaint   Patient presents with    Palpitations       HISTORY OF PRESENT ILLNESS  (Location/Symptom, Timing/Onset, Context/Setting, Quality, Duration, Modifying Factors, Severity.)      Lashonda Massey is a 71 y.o. male who was sent from cardiologist office for SVT. Patient has been having intermittent episodes of feeling dizzy and \"off\". He was at his cardiologist's office today to be evaluated for these symptoms and was found to have a HR in the 140-150s with EKG concerning for SVT and was sent to the ED for evaluation. When EMS arrived and patient was getting into the squad, he spontaneously converted and now states that he has no complaints. Does have a history of this occurring over the last few weeks. No fever or chills. Has not felt ill. H/o CAD with stents placed in 12/2017. Patient states that no history of prior blood clots. No recent long trips. No leg swelling. No shortness of breath aside from baseline. No chest pain. Does have a history of silicosis and follows up with pulmonology regularly. PAST MEDICAL / SURGICAL / SOCIAL / FAMILY HISTORY     Past Medical History:  has a past medical history of Ankylosing spondylitis ([address] Arthritis, Asthma not dependent on systemic steroids without complication, CAD (coronary artery disease), Hypertension, DEB treated with BiPAP, Pulmonary hypertension ([address] and Silicosis ([address] Past Surgical History:  has a past surgical history that includes joint replacement and Coronary angioplasty with stent (12/09/2017). Allergies:  Penicillins     Home Meds:   Prior to Visit Medications    Medication Sig Taking?  Authorizing Provider   meloxicam (MOBIC) 15 MG tablet Take 15 mg by mouth daily  Historical Provider, MD   nitroGLYCERIN (NITROSTAT) 0.4 MG SL tablet Place 0.4 mg under the tongue every 5 minutes as needed for Chest pain up to max of 3 total doses. If no relief after 1 dose, call 911. Historical Provider, MD   lisinopril (PRINIVIL;ZESTRIL) 10 MG tablet Take 1 tablet by mouth daily  Earnest Panchal MD   atorvastatin (LIPITOR) 80 MG tablet Take 1 tablet by mouth nightly  Earnest Panchal MD   metoprolol succinate (TOPROL XL) 50 MG extended release tablet Take 1 tablet by mouth daily  Earnest Panchal MD   ticagrelor (BRILINTA) 90 MG TABS tablet Take 1 tablet by mouth 2 times daily  Earnest Panchal MD   aspirin 81 MG chewable tablet Take 1 tablet by mouth daily  Earnest Panchal MD   montelukast (SINGULAIR) 10 MG tablet Take 10 mg by mouth nightly  Historical Provider, MD   albuterol (PROVENTIL) (2.5 MG/3ML) 0.083% nebulizer solution Take 2.5 mg by nebulization 4 times daily  Historical Provider, MD   citalopram (CELEXA) 40 MG tablet Take 40 mg by mouth daily  Historical Provider, MD   Fluticasone Furoate-Vilanterol (BREO ELLIPTA) 200-25 MCG/INH AEPB Inhale 200 mcg into the lungs daily  Historical Provider, MD     Please note that medications prescribed at discharge will auto-populate into this medication list when note is refreshed. Please look at prescription date andprescriber to clarify. Family History:reviewed with patient and none reported. Social History: He reports that he quit smoking about 44 years ago. His smoking use included cigarettes. He has a 0.25 pack-year smoking history. He has never used smokeless tobacco. He reports current alcohol use. He reports that he does not use drugs. He has no history on file for sexual activity. REVIEW OF SYSTEMS    (2-9 systems for level 4, 10 or more for level 5)      Review of Systems   Constitutional: Negative for chills and fever. HENT: Negative for congestion, sneezing and sore throat. Eyes: Negative for pain and visual disturbance. pale.      Findings: No erythema or rash. Neurological:      General: No focal deficit present. Mental Status: He is alert and oriented to person, place, and time. Coordination: Coordination normal.   Psychiatric:         Behavior: Behavior normal.         Thought Content: Thought content normal.         Judgment: Judgment normal.         DIFFERENTIAL DIAGNOSIS/IMPRESSION     DDX: SVT, electrolyte disturbance, thyroid dysfunction, ACS, PE     Impression: 71 y.o. male who presents with hospital SVT in the cardiologist office. Had been having intermittent dizziness spells for couple weeks. Was being seen for this in his cardiologist office months and a heart rate of 1 40-1 50 with an EKG that is concerning for SVT, but he converted out of the spontaneously when getting into the EMS squad. On exam he has no complaints at this time. His exam is unremarkable. He has no significant PE risk factors but given that he is over 55 cannot apply PERC rule. Will do cardiac work-up, electrolyte, ACS work-up, and will get a d-dimer. Once labs have resulted, will speak to cardiologist for further recommendations. DIAGNOSTIC RESULTS     EKG: All EKG's are interpreted by the Emergency Department Physician who either signs or Co-signs this chart in the absence of a cardiologist.    EKG Interpretation    Interpreted by emergency department physician    Rhythm: normal sinus   Rate: 94  Axis: left  Ectopy: none  Conduction: normal  ST Segments: no acute change  T Waves: non specific changes  Q Waves: nonspecific    Clinical Impression: Normal sinus rhythm with left axis deviation. Age-indeterminate septal infarct. Compared to EKG done earlier today at outpatient office, no longer in SVT with fusion complexes.   Compared to EKG on 12/11/17 with no significant changes     Lin Danielle      LABS: Labswere reviewed by me and abnormal results are displayed above     Labs Reviewed   CBC WITH AUTO DIFFERENTIAL - Abnormal; Notable for the following components:       Result Value    RDW 15.6 (*)     Seg Neutrophils 78 (*)     Lymphocytes 9 (*)     Monocytes 10 (*)     Absolute Lymph # 0.90 (*)     All other components within normal limits   COMPREHENSIVE METABOLIC PANEL - Abnormal; Notable for the following components:    Glucose 280 (*)     Sodium 133 (*)     Alkaline Phosphatase 332 (*)     All other components within normal limits   D-DIMER, QUANTITATIVE - Abnormal; Notable for the following components:    D-Dimer, Quant 0.65 (*)     All other components within normal limits   URINE RT REFLEX TO CULTURE - Abnormal; Notable for the following components:    Glucose, Ur 3+ (*)     All other components within normal limits   TROPONIN   APTT   PROTIME-INR   BRAIN NATRIURETIC PEPTIDE   TSH WITH REFLEX   TROPONIN       RADIOLOGY: All plain film, CT, MRI, and formal ultrasound images (except ED bedside ultrasound) are read by the radiologist, see reports below, unless otherwise noted in ED Course, MDM or here. Xr Chest Standard (2 Vw)    Result Date: 1/30/2020  EXAMINATION: TWO XRAY VIEWS OF THE CHEST 1/30/2020 3:44 pm COMPARISON: 3 February 2016 HISTORY: ORDERING SYSTEM PROVIDED HISTORY: palpitations TECHNOLOGIST PROVIDED HISTORY: palpitations Reason for Exam: AFIB/SOB Acuity: Unknown Type of Exam: Unknown FINDINGS: The patient's chin obscures a portion of both apical areas. Mild cardiomegaly is present. Findings of generalized COPD and old granulomatous changes are noted with upper lobe scarring. Vascularity is top normal.  No evidence of focal acute infiltrate, effusion or pneumothorax is noted. Osseous and mediastinal structures are age-appropriate. COPD and chronic changes. Stable mild cardiomegaly. Vascularity is top normal to minimally prominent. No evidence of focal acute infiltrate or gross effusion. BEDSIDE ULTRASOUND:  Not clinically indicated at this time.       ED COURSE      ED Medication Orders (From admission, onward)    None          EMERGENCYDEPARTMENT COURSE:  ED Course as of Jan 30 1954   Thu Jan 30, 2020   1530 Patient's D-dimer is 0.65, given that patient's age is 71 this is under the age adjusted d-dimer, no indication for further workup of PE.     [CK]      ED Course User Index  [CK] Ellis Schuster MD      Spoke to Dr. Jared Fernandez with cardiology. Okay with patient being discharged home. Recommend follow-up next week. Patient and family comfortable with this plan. Will discharge at this time. PROCEDURES:  None     CONSULTS:  IP CONSULT TO CARDIOLOGY    CRITICAL CARE:  None      FINAL IMPRESSION      1.  Tachycardia         DISPOSITION / PLAN     DISPOSITION Decision To Discharge 01/30/2020 05:59:27 PM      PATIENT REFERRED TO:  Adeel Ward DO  [address]  [address]  [address]  [phone]    Schedule an appointment as soon as possible for a visit       Cliff Cabello MD  [address]  [facility] [address] [phone]    Schedule an appointment as soon as possible for a visit in 1 week      [facility] ED  [facility] [address]  [address] [address]  [phone]  Go to   As needed, If symptoms worsen      DISCHARGE MEDICATIONS:  Discharge Medication List as of 1/30/2020  6:00 PM          Ellis Schuster MD  Emergency Medicine Attending      (Please note that portions of this note were completed with a voice recognition program.  Efforts were made to edit the dictations but occasionallywords are mis-transcribed.)          Ellis Schuster MD  01/30/20 1954

## 2020-01-31 LAB
EKG ATRIAL RATE: 94 BPM
EKG P AXIS: 47 DEGREES
EKG P-R INTERVAL: 184 MS
EKG Q-T INTERVAL: 374 MS
EKG QRS DURATION: 94 MS
EKG QTC CALCULATION (BAZETT): 467 MS
EKG R AXIS: -45 DEGREES
EKG T AXIS: 46 DEGREES
EKG VENTRICULAR RATE: 94 BPM

## 2020-01-31 PROCEDURE — 93010 ELECTROCARDIOGRAM REPORT: CPT | Performed by: INTERNAL MEDICINE

## 2020-02-26 ENCOUNTER — HOSPITAL ENCOUNTER (OUTPATIENT)
Dept: CT IMAGING | Age: 70
Discharge: HOME | End: 2020-02-28
Payer: COMMERCIAL

## 2020-02-26 PROCEDURE — 71250 CT THORAX DX C-: CPT

## 2020-05-01 RX ORDER — AMLODIPINE BESYLATE 2.5 MG/1
TABLET ORAL
Qty: 30 TABLET | Refills: 0 | Status: SHIPPED | OUTPATIENT
Start: 2020-05-01

## 2020-06-01 ENCOUNTER — TELEMEDICINE (OUTPATIENT)
Dept: FAMILY MEDICINE CLINIC | Age: 70
End: 2020-06-01
Payer: COMMERCIAL

## 2020-06-01 PROBLEM — J62.8 CHRONIC SILICOSIS (HCC): Status: ACTIVE | Noted: 2020-06-01

## 2020-06-01 PROBLEM — I21.3 STEMI (ST ELEVATION MYOCARDIAL INFARCTION) (HCC): Status: RESOLVED | Noted: 2017-12-09 | Resolved: 2020-06-01

## 2020-06-01 PROBLEM — I25.2 HISTORY OF ST ELEVATION MYOCARDIAL INFARCTION (STEMI): Status: ACTIVE | Noted: 2020-06-01

## 2020-06-01 PROBLEM — J45.40 MODERATE PERSISTENT ASTHMA WITHOUT COMPLICATION: Status: ACTIVE | Noted: 2020-06-01

## 2020-06-01 PROBLEM — I25.10 CORONARY ARTERY DISEASE INVOLVING NATIVE CORONARY ARTERY OF NATIVE HEART WITHOUT ANGINA PECTORIS: Status: ACTIVE | Noted: 2020-06-01

## 2020-06-01 PROCEDURE — 99203 OFFICE O/P NEW LOW 30 MIN: CPT | Performed by: INTERNAL MEDICINE

## 2020-06-01 RX ORDER — BUSPIRONE HYDROCHLORIDE 10 MG/1
10 TABLET ORAL 2 TIMES DAILY
Qty: 180 TABLET | Refills: 1 | Status: SHIPPED | OUTPATIENT
Start: 2020-06-01 | End: 2020-07-01

## 2020-06-01 RX ORDER — MELOXICAM 15 MG/1
15 TABLET ORAL DAILY
Qty: 90 TABLET | Refills: 1 | Status: SHIPPED | OUTPATIENT
Start: 2020-06-01 | End: 2020-12-21

## 2020-06-01 RX ORDER — METOPROLOL SUCCINATE 50 MG/1
50 TABLET, EXTENDED RELEASE ORAL DAILY
Qty: 90 TABLET | Refills: 1 | Status: SHIPPED | OUTPATIENT
Start: 2020-06-01 | End: 2020-12-22 | Stop reason: SDUPTHER

## 2020-06-01 RX ORDER — CITALOPRAM 40 MG/1
40 TABLET ORAL DAILY
Qty: 90 TABLET | Refills: 1 | Status: SHIPPED
Start: 2020-06-01 | End: 2020-09-22 | Stop reason: ALTCHOICE

## 2020-06-01 ASSESSMENT — ENCOUNTER SYMPTOMS
ANAL BLEEDING: 0
WHEEZING: 0
BACK PAIN: 1
ABDOMINAL PAIN: 0
CHEST TIGHTNESS: 0
DIARRHEA: 0
SHORTNESS OF BREATH: 0
CONSTIPATION: 0
BLOOD IN STOOL: 0
CHOKING: 0
VOMITING: 0
COUGH: 0
NAUSEA: 0

## 2020-06-01 NOTE — PROGRESS NOTES
Historical Provider, MD   nitroGLYCERIN (NITROSTAT) 0.4 MG SL tablet Place 0.4 mg under the tongue every 5 minutes as needed for Chest pain up to max of 3 total doses. If no relief after 1 dose, call 911.  Yes Historical Provider, MD   lisinopril (PRINIVIL;ZESTRIL) 10 MG tablet Take 1 tablet by mouth daily Yes Jadyn Perez MD   atorvastatin (LIPITOR) 80 MG tablet Take 1 tablet by mouth nightly Yes Jadyn Perez MD   metoprolol succinate (TOPROL XL) 50 MG extended release tablet Take 1 tablet by mouth daily Yes Jadyn Perez MD   ticagrelor (BRILINTA) 90 MG TABS tablet Take 1 tablet by mouth 2 times daily Yes Jadyn Perez MD   aspirin 81 MG chewable tablet Take 1 tablet by mouth daily Yes Jadyn Perez MD   albuterol (PROVENTIL) (2.5 MG/3ML) 0.083% nebulizer solution Take 2.5 mg by nebulization 4 times daily Yes Historical Provider, MD   citalopram (CELEXA) 40 MG tablet Take 40 mg by mouth daily Yes Historical Provider, MD   Fluticasone Furoate-Vilanterol (BREO ELLIPTA) 200-25 MCG/INH AEPB Inhale 200 mcg into the lungs daily Yes Historical Provider, MD   montelukast (SINGULAIR) 10 MG tablet Take 10 mg by mouth nightly  Historical Provider, MD       Social History     Tobacco Use    Smoking status: Former Smoker     Packs/day: 0.25     Years: 1.00     Pack years: 0.25     Types: Cigarettes     Last attempt to quit: 1975     Years since quittin.5    Smokeless tobacco: Never Used   Substance Use Topics    Alcohol use: Yes     Comment: meryl social drinker    Drug use: Never        Past Medical History:   Diagnosis Date    [facility]     Arthritis     Asthma not dependent on systemic steroids without complication     CAD (coronary artery disease)     Hypertension     DEB treated with BiPAP 2017    Pulmonary [facility] ([facility]     Silicosis ([facility] 10/6331    STEMI (ST elevation myocardial infarction) ([facility] 2017   ,   Past Surgical History:   Procedure

## 2020-06-12 ENCOUNTER — HOSPITAL ENCOUNTER (OUTPATIENT)
Dept: PREADMISSION TESTING | Age: 70
Setting detail: SPECIMEN
Discharge: HOME | End: 2020-06-16
Payer: COMMERCIAL

## 2020-06-12 PROCEDURE — U0004 COV-19 TEST NON-CDC HGH THRU: HCPCS

## 2020-06-14 LAB
SARS-COV-2, PCR: NOT DETECTED
SARS-COV-2, RAPID: NORMAL
SARS-COV-2: NORMAL
SOURCE: NORMAL

## 2020-06-15 ENCOUNTER — TELEPHONE (OUTPATIENT)
Dept: PRIMARY CARE CLINIC | Age: 70
End: 2020-06-15

## 2020-06-16 ENCOUNTER — HOSPITAL ENCOUNTER (OUTPATIENT)
Dept: CARDIAC CATH/INVASIVE PROCEDURES | Age: 70
Discharge: HOME | End: 2020-06-16
Payer: COMMERCIAL

## 2020-06-16 VITALS — OXYGEN SATURATION: 97 % | BODY MASS INDEX: 44.49 KG/M2 | HEIGHT: 71 IN

## 2020-06-16 LAB
GFR NON-AFRICAN AMERICAN: >60 ML/MIN
GFR SERPL CREATININE-BSD FRML MDRD: >60 ML/MIN
GFR SERPL CREATININE-BSD FRML MDRD: NORMAL ML/MIN/{1.73_M2}
GLUCOSE BLD-MCNC: 206 MG/DL (ref 74–100)
LV EF: 45 %
LVEF MODALITY: NORMAL
PLATELET # BLD: 201 K/UL (ref 138–453)
POC CREATININE: 0.75 MG/DL (ref 0.51–1.19)
POC HEMATOCRIT: 44 % (ref 41–53)
POC HEMOGLOBIN: 15.1 G/DL (ref 13.5–17.5)
POC IONIZED CALCIUM: 1.21 MMOL/L (ref 1.15–1.33)
POC POTASSIUM: 4 MMOL/L (ref 3.5–4.5)
POC SODIUM: 135 MMOL/L (ref 138–146)

## 2020-06-16 PROCEDURE — 85014 HEMATOCRIT: CPT

## 2020-06-16 PROCEDURE — C1894 INTRO/SHEATH, NON-LASER: HCPCS

## 2020-06-16 PROCEDURE — 7100000001 HC PACU RECOVERY - ADDTL 15 MIN

## 2020-06-16 PROCEDURE — C1769 GUIDE WIRE: HCPCS

## 2020-06-16 PROCEDURE — 93458 L HRT ARTERY/VENTRICLE ANGIO: CPT | Performed by: INTERNAL MEDICINE

## 2020-06-16 PROCEDURE — C1760 CLOSURE DEV, VASC: HCPCS

## 2020-06-16 PROCEDURE — 6360000004 HC RX CONTRAST MEDICATION

## 2020-06-16 PROCEDURE — 85049 AUTOMATED PLATELET COUNT: CPT

## 2020-06-16 PROCEDURE — 82947 ASSAY GLUCOSE BLOOD QUANT: CPT

## 2020-06-16 PROCEDURE — 84132 ASSAY OF SERUM POTASSIUM: CPT

## 2020-06-16 PROCEDURE — 2709999900 HC NON-CHARGEABLE SUPPLY

## 2020-06-16 PROCEDURE — 6360000002 HC RX W HCPCS

## 2020-06-16 PROCEDURE — 82565 ASSAY OF CREATININE: CPT

## 2020-06-16 PROCEDURE — 84295 ASSAY OF SERUM SODIUM: CPT

## 2020-06-16 PROCEDURE — 2500000003 HC RX 250 WO HCPCS

## 2020-06-16 PROCEDURE — 7100000000 HC PACU RECOVERY - FIRST 15 MIN

## 2020-06-16 PROCEDURE — 82330 ASSAY OF CALCIUM: CPT

## 2020-06-16 RX ORDER — SODIUM CHLORIDE 9 MG/ML
INJECTION, SOLUTION INTRAVENOUS CONTINUOUS
Status: DISCONTINUED | OUTPATIENT
Start: 2020-06-16 | End: 2020-06-17 | Stop reason: HOSPADM

## 2020-06-16 RX ADMIN — SODIUM CHLORIDE: 9 INJECTION, SOLUTION INTRAVENOUS at 13:25

## 2020-06-16 NOTE — H&P
Attestation signed by      Attending Physician Statement:    I have discussed the care of  [facility] , including pertinent history and exam findings, with the Cardiology fellow/resident. I have seen and examined the patient and the key elements of all parts of the encounter have been performed by me. I agree with the assessment, plan and orders as documented by the fellow/resident, after I modified exam findings and plan of treatments, and the final version is my approved version of the assessment. Additional Comments: Abnormal stress test. H/o CAD. Proceed with cardiac cath. Hema Finley MD         [facility]    Consult / H&P               Today's Date: 6/16/2020  Patient Name: [facility]  Date of admission: 6/16/2020 12:38 PM  Patient's age: 79 y.o., 1950  Admission Dx: Abnormal result of other cardiovascular function study [R94.39]    Reason for Consult:  Cardiac evaluation    Requesting Physician: No admitting provider for patient encounter. CHIEF COMPLAINT:  Chest pain     History Obtained From:  patient, electronic medical record    HISTORY OF PRESENT ILLNESS:    Purvi Moore 79 y.o. male seen in clinic 03/2020 for symptoms of chest pain. He underwent stress test which showed moderate anterior ischemia. He has known CAD with STEMI in 2017 requiring DAGO. Past Medical History:   has a past medical history of Ankylosing spondylitis ([address] Arthritis, Asthma not dependent on systemic steroids without complication, CAD (coronary artery disease), Hypertension, DEB treated with BiPAP, Pulmonary hypertension ([address] Silicosis ([address] and STEMI (ST elevation myocardial infarction) ([facility] Past Surgical History:   has a past surgical history that includes joint replacement and Coronary angioplasty with stent (12/09/2017). Home Medications:    Prior to Admission medications    Medication Sig Start Date End Date Taking?  Authorizing Provider   busPIRone (BUSPAR) 10 MG tablet Take 1 tablet by mouth 2 times daily 6/1/20 7/1/20 Yes Carole Fink MD   meloxicam FRANCISCAAILYN MG [facility]) 15 MG tablet Take 1 tablet by mouth daily 6/1/20  Yes Carole Fink MD   metoprolol succinate (TOPROL XL) 50 MG extended release tablet Take 1 tablet by mouth daily 6/1/20  Yes Veronica Reyna MD   citalopram (CELEXA) 40 MG tablet Take 1 tablet by mouth daily 6/1/20  Yes Carole Fink MD   amLODIPine (NORVASC) 2.5 MG tablet TAKE 1 Tablet  BY MOUTH EVERY DAY 5/1/20  Yes Robinson Barnett MD   lisinopril (PRINIVIL;ZESTRIL) 10 MG tablet Take 1 tablet by mouth daily 12/12/17  Yes Param Castano MD   atorvastatin (LIPITOR) 80 MG tablet Take 1 tablet by mouth nightly 12/11/17  Yes Param Castano MD   ticagrelor (BRILINTA) 90 MG TABS tablet Take 1 tablet by mouth 2 times daily 12/11/17  Yes Param Castano MD   aspirin 81 MG chewable tablet Take 1 tablet by mouth daily 12/12/17  Yes Param Castano MD   montelukast (SINGULAIR) 10 MG tablet Take 10 mg by mouth nightly   Yes Historical Provider, MD   albuterol (PROVENTIL) (2.5 MG/3ML) 0.083% nebulizer solution Take 2.5 mg by nebulization 4 times daily   Yes Historical Provider, MD   Fluticasone Furoate-Vilanterol (BREO ELLIPTA) 200-25 MCG/INH AEPB Inhale 200 mcg into the lungs daily   Yes Historical Provider, MD   nitroGLYCERIN (NITROSTAT) 0.4 MG SL tablet Place 0.4 mg under the tongue every 5 minutes as needed for Chest pain up to max of 3 total doses. If no relief after 1 dose, call 911. Historical Provider, MD      Current Facility-Administered Medications: 0.9 % sodium chloride infusion, , Intravenous, Continuous    Allergies:  Penicillins    Social History:   reports that he quit smoking about 44 years ago. His smoking use included cigarettes. He has a 0.25 pack-year smoking history. He has never used smokeless tobacco. He reports current alcohol use. He reports that he does not use drugs. Family History: family history is not on file.  No h/o sudden cardiac death. No for premature CAD    REVIEW OF SYSTEMS:    · Constitutional: there has been no unanticipated weight loss. There's been No change in energy level, No change in activity level. · Eyes: No visual changes or diplopia. No scleral icterus. · ENT: No Headaches  · Cardiovascular: No cardiac history  · Respiratory: No previous pulmonary problems, No cough  · Gastrointestinal: No abdominal pain. No change in bowel or bladder habits. · Genitourinary: No dysuria, trouble voiding, or hematuria. · Musculoskeletal:  No gait disturbance, No weakness or joint complaints. · Integumentary: No rash or pruritis. · Neurological: No headache, diplopia, change in muscle strength, numbness or tingling. No change in gait, balance, coordination, mood, affect, memory, mentation, behavior. · Psychiatric: No anxiety, or depression. · Endocrine: No temperature intolerance. No excessive thirst, fluid intake, or urination. No tremor. · Hematologic/Lymphatic: No abnormal bruising or bleeding, blood clots or swollen lymph nodes. · Allergic/Immunologic: No nasal congestion or hives. PHYSICAL EXAM:      Ht 5' 11\" (1.803 m)   SpO2 97%   BMI 44.49 kg/m²    Constitutional and General Appearance: alert, cooperative, no distress and appears stated age  [de-identified]: PERRL, no cervical lymphadenopathy. No masses palpable. Normal oral mucosa  Respiratory:  · Normal excursion and expansion without use of accessory muscles  · Resp Auscultation: Good respiratory effort. No for increased work of breathing.  On auscultation: clear to auscultation bilaterally  Cardiovascular:  · The apical impulse is not displaced  · Heart tones are crisp and normal. regular S1 and S2.  · Jugular venous pulsation Normal  · The carotid upstroke is normal in amplitude and contour without delay or bruit  · Peripheral pulses are symmetrical and full   Abdomen:   · No masses or tenderness  · Bowel sounds present  Extremities:  ·  No Cyanosis or

## 2020-06-16 NOTE — OP NOTE
[facility]        Date:   6/16/2020  Patient name:  Claire Paula  Date of admission:  6/16/2020 12:38 PM  MRN:   [id number]  YOB: 1950    CARDIAC CATHETERIZATION    Operators:  FFFDAYC:DAYSI Todd MD    Procedure performed:       [x] Left Heart Catheterization. [] Graft Angiography. [x] Left Ventriculography. [] Right Heart Catheterization. [x] Coronary Angiography. [] Aortic Valve Studies. [] PCI:      [] Other:         Pre Procedure Conscious Sedation Data:    ASA Class:    [] I [x] II [] III [] IV    Mallampati Class:  [] I [x] II [] III [] IV      Indication:  [] STEMI      [x] + Stress test  [] ACS      [] + EKG Changes  [] Non Q MI       [x] Significant Risk Factors  [] Recurrent Angina             [] Diabetes Mellitus    [] New LBBB      [] Uncontrolled HTN. [] CHF / Low EF changes     [] Abnormal CTA / Ca Score  [x] Other: Dyspnea  Procedure:  Access:  [x] Femoral  [] Radial  artery       [x] Right  [] Left    Procedure: After informed consent was obtained with explanation of the risks and benefits, patient was brought to the cath lab. The right and left groin were prepped and draped in sterile fashion. 1% lidocaine was used for local block. The  right femoral artery was cannulated with 6  Fr sheath with brisk arterial blood return. The side port was frequently flushed and aspirated with normal saline. Findings:     LMCA: Normal 0% stenosis.     LAD: has patent proximal stent. The D1 is jailed to 90% with LOR-3 flow.     LCx: has ostial 30% stenosis. The OM1 and OM2 are normal.     RCA: has 20% stenosis.     Ramus: has proximal 20% stenosis.      Coronary Tree      Dominance: Right      The LV gram was performed in the LANE [address] position. LVEF: 45%. LV Wall Motion: Basal anterolateral hypokinesis     Estimated blood loss: 10 ml    Conclusions:   One vessel coronary artery disease with patent proximal LAD stent and jailed D1 with LOR-3 flow.    LVEF

## 2020-06-22 ENCOUNTER — HOSPITAL ENCOUNTER (OUTPATIENT)
Age: 70
Setting detail: SPECIMEN
Discharge: HOME | End: 2020-06-22
Payer: COMMERCIAL

## 2020-06-22 LAB
EOSINOPHILS ABSOLUTE: 252 /UL (ref 200–400)
EOSINOPHILS RELATIVE PERCENT: NORMAL %
ESTIMATED AVERAGE GLUCOSE: 237 MG/DL
HBA1C MFR BLD: 9.9 % (ref 4–6)
IGE: 1460 IU/ML
WBC # BLD: NORMAL K/UL

## 2020-06-24 ENCOUNTER — TELEPHONE (OUTPATIENT)
Dept: FAMILY MEDICINE CLINIC | Age: 70
End: 2020-06-24

## 2020-06-24 RX ORDER — LANCETS 30 GAUGE
1 EACH MISCELLANEOUS 2 TIMES DAILY
Qty: 100 EACH | Refills: 5 | Status: SHIPPED | OUTPATIENT
Start: 2020-06-24 | End: 2022-01-18 | Stop reason: SDUPTHER

## 2020-06-24 RX ORDER — GLUCOSAMINE HCL/CHONDROITIN SU 500-400 MG
CAPSULE ORAL
Qty: 100 STRIP | Refills: 3 | Status: SHIPPED | OUTPATIENT
Start: 2020-06-24 | End: 2022-01-18 | Stop reason: SDUPTHER

## 2020-06-24 RX ORDER — BLOOD-GLUCOSE METER
KIT MISCELLANEOUS
Qty: 1 KIT | Refills: 0 | Status: SHIPPED | OUTPATIENT
Start: 2020-06-24

## 2020-06-24 NOTE — TELEPHONE ENCOUNTER
It was suggested for pt to do DM education, monitor BS and start Metformin. The Metformin, referral nor the DM supplies were ordered.     I did pend everything but the Metformin

## 2020-07-17 ENCOUNTER — HOSPITAL ENCOUNTER (OUTPATIENT)
Dept: DIABETES SERVICES | Age: 70
Setting detail: THERAPIES SERIES
Discharge: HOME | End: 2020-07-17
Payer: COMMERCIAL

## 2020-07-17 PROCEDURE — G0108 DIAB MANAGE TRN  PER INDIV: HCPCS

## 2020-07-17 SDOH — ECONOMIC STABILITY: FOOD INSECURITY: ADDITIONAL INFORMATION: NO

## 2020-07-17 ASSESSMENT — PROBLEM AREAS IN DIABETES QUESTIONNAIRE (PAID)
FEELING DEPRESSED WHEN YOU THINK ABOUT LIVING WITH DIABETES: 0
COPING WITH COMPLICATIONS OF DIABETES: 0
WORRYING ABOUT THE FUTURE AND THE POSSIBILITY OF SERIOUS COMPLICATIONS: 3
PAID-5 TOTAL SCORE: 3
FEELING THAT DIABETES IS TAKING UP TOO MUCH OF YOUR MENTAL AND PHYSICAL ENERGY EVERY DAY: 0
FEELING SCARED WHEN YOU THINK ABOUT LIVING WITH DIABETES: 0

## 2020-07-18 NOTE — PROGRESS NOTES
This visit is a TeleHealth encounter (During [place] [address] public [facility] emergency). Pursuant to the emergency declaration under the [address] [place] [address] and the [employer] Act, this Virtual Visit was conducted with patient's (and/or legal guardian's) consent, to reduce the patient's risk of exposure to COVID-19 and provide necessary medical care. The patient (and/or legal guardian) has also been advised to contact this office for worsening conditions or problems, and seek emergency medical treatment and/or call 911 if deemed necessary. Patient identification was verified at the start of the visit: Yes    Total time spent for this encounter: 1 hour, 10:15-11:30am    Services were provided through a video synchronous discussion virtually to substitute for in-person clinic visit. Patient and provider were located at their individual home/office. Also see Diabetic Screening  Patient, Loida Bloomjosette for diabetes self-management education  visit/ assessment on 7/18/20   ( update if when return to face to face encounter)     MEDICAL HISTORY:  Past Medical History:   Diagnosis Date    [facility]yl[facility] spondyl[facility] ([facility]     Arthritis     Asthma not dependent on systemic steroids without complication 83/98/6173    CAD (coronary artery disease)     Hypertension     DEB treated with BiPAP 12/07/2017    Pulmonary hypertension ([facility] 69/65/4286    Silicosis ([facility] 83/9636    STEMI (ST elevation myocardial infarction) ([facility] 12/9/2017     No family history on file.   Penicillins   Immunization History   Administered Date(s) Administered    Influenza, Quadv, IM, PF (6 mo and older Fluzone, Flulaval, Fluarix, and 3 yrs and older Afluria) 12/10/2017     Current Medications  Current Outpatient Medications   Medication Sig Dispense Refill    Loratadine (CLARITIN PO) Take 10 mg by mouth daily      Blood Glucose Monitoring Suppl (BLOOD GLUCOSE SYSTEM MICHAELA) KIT Test once a day 1 kit 0    blood glucose monitor strips Test 1 time a day & as needed for symptoms of irregular blood glucose. 100 strip 3    Lancets MISC 1 each by Does not apply route 2 times daily 100 each 5    metFORMIN (GLUCOPHAGE) 500 MG tablet Take 1 tablet by mouth 2 times daily (with meals) Start with one tablet once daily for two weeks then increase to twice daily thereafter. 60 tablet 5    meloxicam (MOBIC) 15 MG tablet Take 1 tablet by mouth daily 90 tablet 1    metoprolol succinate (TOPROL XL) 50 MG extended release tablet Take 1 tablet by mouth daily 90 tablet 1    citalopram (CELEXA) 40 MG tablet Take 1 tablet by mouth daily 90 tablet 1    amLODIPine (NORVASC) 2.5 MG tablet TAKE 1 Tablet  BY MOUTH EVERY DAY 30 tablet 0    nitroGLYCERIN (NITROSTAT) 0.4 MG SL tablet Place 0.4 mg under the tongue every 5 minutes as needed for Chest pain up to max of 3 total doses. If no relief after 1 dose, call 911.  lisinopril (PRINIVIL;ZESTRIL) 10 MG tablet Take 1 tablet by mouth daily 30 tablet 3    atorvastatin (LIPITOR) 80 MG tablet Take 1 tablet by mouth nightly 30 tablet 3    ticagrelor (BRILINTA) 90 MG TABS tablet Take 1 tablet by mouth 2 times daily 60 tablet 11    aspirin 81 MG chewable tablet Take 1 tablet by mouth daily 30 tablet 3    montelukast (SINGULAIR) 10 MG tablet Take 10 mg by mouth nightly      albuterol (PROVENTIL) (2.5 MG/3ML) 0.083% nebulizer solution Take 2.5 mg by nebulization 4 times daily      Fluticasone Furoate-Vilanterol (BREO ELLIPTA) 200-25 MCG/INH AEPB Inhale 200 mcg into the lungs daily       No current facility-administered medications for this encounter.    :     Comments:  Allergies:     Allergies   Allergen Reactions    Penicillins        A1C blood level   Lab Results   Component Value Date    LABA1C 9.9 (H) 06/22/2020    LABA1C 6.6 (H) 12/09/2017     Lab Results   Component Value Date    CREATININE 0.75 06/16/2020 Blood pressure   BP Readings from Last 3 Encounters:   01/30/20 121/60   12/11/17 124/64        Cholesterol  Lab Results   Component Value Date    LDLCHOLESTEROL 99 12/10/2017        Diabetes Self- Management Education Record    Participant Name: Citlaly Bennett  Referring Provider: Bridger Wesley MD   Assessment/Evaluation Ratings:  1=Needs Instruction   4=Demonstrates Understanding/Competency  2=Needs Review   NC=Not Covered    3=Comprehends Key Points  N/A=Not Applicable  Topics/Learning Objectives Pre-session Assess Date:  7/16/20 Jyoti Perales. Date Reinforce Date Post- session Eval Comments   Diabetes disease process & Treatment process: Define diabetes & pre-diabetes; Identify own type of diabetes; role of the pancreas; signs/symptoms; diagnostic criteria; prevention & treatment options; contributing factors. 1    Newly diagnosed with DM although A1c 6.6% 12/17. States dad and grandfather had DM. Incorporating nutritional management into lifestyle: Describe effect of type, amount & timing of food on blood glucose; Describe basic meal planning techniques & current nutrition guideline   1- has lost 9# since learning of DM, started eating breakfast cut out ice cream and snacks. Eating 3x/day regular times. Wife cooks, shops. Meals balanced, pt likes most foods. .    What to eat - Food groups, When to eat - timing of meals and snacks, and How much to eat - portions control. calories/ day   CHO choices/ meal   CHO choices/  day   grams of protein /day   gram of fat /day     Correctly read food labels & demonstrate CHO counting & portion control with personalized meal plan. Identify dining out strategies, & dietary sick day guidelines. 1       Incorporating physical activity into lifestyle:   Verbalize effect of exercise on blood glucose levels; benefits of regular exercise; safety considerations; contraindications; maintenance of activity.    1    Limited by weight, arthritis and breathing issues but pt and wife have been walking at night after supper. Gets light activity. Using medications safely:  Identify effects of diabetes medicines on blood glucose levels; List diabetes medication taken, action & side effects;    1    500mg metformin bid   Insulin / Injectable - Appropriate injection sites; proper storage; supplies needed; proper technique; safe needle disposal guidelines. na       Monitoring blood glucose, interpreting and using results:  Identify recommended & personal blood glucose targets; importance of testing; testing supplies; HgbA1C target levels; Factors affecting blood glucose; Importance of logging blood glucose levels for pattern recognition; ketone testing; safe lancet disposal.   2    Checks fbs which have been <150 and many less than 130   Prevention, detection & treatment of acute complications:  Identify symptoms of hyper & hypoglycemia, and prevention & treatment strategies. 1    Has had some symptoms of hi and low bs   Describe sick day guidelines & indications for  physician notification. Identify short term consequences of poor control. Disaster preparedness strategies    1       Prevention, detection & treatment of chronic complications:  Define the natural course of diabetes & describe the relationship of blood glucose levels to long term complications of diabetes. Identify preventative measures & standards of care. 1       Developing strategies to address psychosocial issues:  Describe feelings about living with diabetes; Describe how stress, depression & anxiety affect blood glucose; Identify coping strategies; Identify support needed & support network available. 1    Has positive attitude and good support from his wife and daughter. Has 2nd home in [address] [place] [address]. Motivated to make changes. Developing strategies to promote health/change behavior: Identify 7 self-care behaviors; Personal health risk factors;  Benefits, challenges & strategies for behavioral change;    1         Individualized goal selection. My goal , to help me improve my health, I will:   1. Continue to work physical activity into daily routine. 2.       Plan  Follow-up Appointments planned 7/23 and 7/30 and further TBD     Instruction Method: [x]Lecture/Discussion  [x]Power Point Presentation  [x]Handouts  []Return Demonstration    Education Materials/Equipment Provided (VIA Mail for phone visits)  :    [x]Self-Management - Initial assessment - Enrolment in to ADA  Where do I Begin, Living with Type 2 diabetes ADA home support program and  handout on diabetes education classes. 7/16/20     []Self-Management  Class 1 -Self-Management  Class 1 - \"Diabetes - your take control guide\" - ADA booklet -  o  \"ready, set, start counting\" teaching sheet in diet section   o  GLP-1 understanding 8 core deficits puzzle sheet in the medication section  o one day food diary and envelope for return of diet HX   o  You tube and website resource sheet-Understanding Type 2 Diabetes from Animated Diabetes Patient [email or web address]      [] Self-Management  Class 2 - Meal Plan and handout for serving sizes, smarter snacking, Ready Set Carb Counting / Plate Method, Nutrient Conversion and International Diabetes [address] Eating for People with Diabetes and Nutrition in the [employer] - fast facts about fast food    [] Self-Management  Class 3 -  Diabetes your take control guide pages 20 - 30,  type 2 diabetes and the role of GLP- 1,  Individualized Diabetes report card     [] Self-Management Class 4 - BD Booklet  Sick Day Rules and  Dinning Out Guide , recipe hand outs and tips, diabetes Cookbooks  ( when available)     []Self-Management - 3 month follow -  AADE7 Self care behaviors work sheets, 2020 [employer],  Online resource list - March 2020      []Self-Management  Gestational - RN class -Resource materials sent out : care booklet - \" REGISTRATION IS REQUIRED - [facility] [phone] call for dates    []  Diabetes Group at  [facility] 6 week diabetes education support   classes - contact : [address] [facility] [phone]  for dates and locations      []ADA  Where do I Begin, Living with Type 2 diabetes ADA home support program    Web site: diabetes. org/living    Call: 1800 DIABETES  e-mail: [email or web address]     []  Internet web sites - ADAWeb site: www.diabetes. org       [] [facility] opens at 8 30 am daily for walkers, shops open at 10 am   [address] [facility], 12[address] [facility]   [phone] Daily - 8:30am - 10am    3rd Tuesday of every month [facility] expert speakers visit from 9 - 10am        [] 34 [facility], [facility], [facility], [place], [facility] anderson (site rotate)  Call [phone] or visit   website: [email or web address] for more details   Check web site for updated times/ dates       [] [address]  1001 [facility], [address] [address] Tuesday and Thursday -   9 :30 am- 10:30am - ongoing   [] [address]  655 [facility], [address] [facility] [address] [address] [facility] Thursday 11:00am - 11:45am     [] Third Wednesday Cooking  Class  Free  Registration is required     930 [facility]. 1100 [facility], 125 [facility] [phone] or   Email [email or web address]   Wednesdays-5:00- 6:00 pm       [] Eat Smart  Be Active & Learn How - Free   Families & grandparents with children in home 0- 25 & pregnant moms          Various sites in community - call to find next session near you. [facility] extension office for future sessions - Eric Machado  Email : Althia [email or web address]. [email or web address]  Phone: [phone]     [] (SNAP-Ed)   - free nutrition education   - adults and youth, who are eligible for the Supplemental Nutrition Assistance Program    Various sites in community - call to find next session near you. [facility] ALEXA-CRANBERRY-YONIS SNAP-Ed  contact Susana Bensonadan, Email:[phone]           Post Education Referrals:      [] [place] Tobacco Quit information sheet and [address] [facility] , 21       [] Dental care - [facility]     [] [facility]) link  phone number - for information and referral to [address], WOUND, WEIGHT MANAGEMENT        []Other  Radha Boyce RD, LD

## 2020-07-23 ENCOUNTER — HOSPITAL ENCOUNTER (OUTPATIENT)
Dept: DIABETES SERVICES | Age: 70
Setting detail: THERAPIES SERIES
Discharge: HOME | End: 2020-07-23
Payer: COMMERCIAL

## 2020-07-23 PROCEDURE — G0108 DIAB MANAGE TRN  PER INDIV: HCPCS

## 2020-07-23 NOTE — PROGRESS NOTES
monitor strips Test 1 time a day & as needed for symptoms of irregular blood glucose. 100 strip 3    Lancets MISC 1 each by Does not apply route 2 times daily 100 each 5    metFORMIN (GLUCOPHAGE) 500 MG tablet Take 1 tablet by mouth 2 times daily (with meals) Start with one tablet once daily for two weeks then increase to twice daily thereafter. 60 tablet 5    meloxicam (MOBIC) 15 MG tablet Take 1 tablet by mouth daily 90 tablet 1    metoprolol succinate (TOPROL XL) 50 MG extended release tablet Take 1 tablet by mouth daily 90 tablet 1    citalopram (CELEXA) 40 MG tablet Take 1 tablet by mouth daily 90 tablet 1    amLODIPine (NORVASC) 2.5 MG tablet TAKE 1 Tablet  BY MOUTH EVERY DAY 30 tablet 0    nitroGLYCERIN (NITROSTAT) 0.4 MG SL tablet Place 0.4 mg under the tongue every 5 minutes as needed for Chest pain up to max of 3 total doses. If no relief after 1 dose, call 911.  lisinopril (PRINIVIL;ZESTRIL) 10 MG tablet Take 1 tablet by mouth daily 30 tablet 3    atorvastatin (LIPITOR) 80 MG tablet Take 1 tablet by mouth nightly 30 tablet 3    ticagrelor (BRILINTA) 90 MG TABS tablet Take 1 tablet by mouth 2 times daily 60 tablet 11    aspirin 81 MG chewable tablet Take 1 tablet by mouth daily 30 tablet 3    montelukast (SINGULAIR) 10 MG tablet Take 10 mg by mouth nightly      albuterol (PROVENTIL) (2.5 MG/3ML) 0.083% nebulizer solution Take 2.5 mg by nebulization 4 times daily      Fluticasone Furoate-Vilanterol (BREO ELLIPTA) 200-25 MCG/INH AEPB Inhale 200 mcg into the lungs daily       No current facility-administered medications for this encounter.    :     Comments:  Allergies:     Allergies   Allergen Reactions    Penicillins        A1C blood level   Lab Results   Component Value Date    LABA1C 9.9 (H) 06/22/2020    LABA1C 6.6 (H) 12/09/2017     Lab Results   Component Value Date    CREATININE 0.75 06/16/2020       Blood pressure   BP Readings from Last 3 Encounters:   01/30/20 121/60   12/11/17 124/64        Cholesterol  Lab Results   Component Value Date    LDLCHOLESTEROL 99 12/10/2017        Diabetes Self- Management Education Record    Participant Name: Shikha Richmond  Referring Provider: Yanet Meier MD   Assessment/Evaluation Ratings:  1=Needs Instruction   4=Demonstrates Understanding/Competency  2=Needs Review   NC=Not Covered    3=Comprehends Key Points  N/A=Not Applicable  Topics/Learning Objectives Pre-session Assess Date:  7/16/20 Liztino Quinteros. Date Reinforce Date Post- session Eval Comments   Diabetes disease process & Treatment process: Define diabetes & pre-diabetes; Identify own type of diabetes; role of the pancreas; signs/symptoms; diagnostic criteria; prevention & treatment options; contributing factors. 1 7/23/20rs    Newly diagnosed with DM although A1c 6.6% 12/17. States dad and grandfather had DM. Incorporating nutritional management into lifestyle: Describe effect of type, amount & timing of food on blood glucose; Describe basic meal planning techniques & current nutrition guideline   1- has lost 9# since learning of DM, started eating breakfast cut out ice cream and snacks. Eating 3x/day regular times. Wife cooks, shops. Meals balanced, pt likes most foods. .    What to eat - Food groups, When to eat - timing of meals and snacks, and How much to eat - portions control. calories/ day   CHO choices/ meal   CHO choices/  day   grams of protein /day   gram of fat /day     Correctly read food labels & demonstrate CHO counting & portion control with personalized meal plan. Identify dining out strategies, & dietary sick day guidelines. 1       Incorporating physical activity into lifestyle:   Verbalize effect of exercise on blood glucose levels; benefits of regular exercise; safety considerations; contraindications; maintenance of activity. 1 7/23/20rs    Limited by weight, arthritis and breathing issues but pt and wife have been walking at night after supper.  Gets light promote health/change behavior: Identify 7 self-care behaviors; Personal health risk factors; Benefits, challenges & strategies for behavioral change;    1 7/23/20rs         Individualized goal selection. My goal , to help me improve my health, I will:   1. Continue to work physical activity into daily routine. 2.       Plan  Follow-up Appointments planned 7/23 and 7/30 and further TBD     Instruction Method: [x]Lecture/Discussion  [x]Power Point Presentation  [x]Handouts  []Return Demonstration    Education Materials/Equipment Provided (VIA Mail for phone visits)  :    [x]Self-Management - Initial assessment - Enrolment in to ADA  Where do I Begin, Living with Type 2 diabetes ADA home support program and  handout on diabetes education classes. 7/16/20 NAZARIO    [x]Self-Management  Class 1 -Self-Management  Class 1 - \"Diabetes - your take control guide\" - ADA booklet -  o  \"ready, set, start counting\" teaching sheet in diet section   o  GLP-1 understanding 8 core deficits puzzle sheet in the medication section  o one day food diary and envelope for return of diet HX   o  You tube and website resource sheet-Understanding Type 2 Diabetes from Animated Diabetes Patient https://[email or web address] be/[email or web address]      [] Self-Management  Class 2 - Meal Plan and handout for serving sizes, smarter snacking, Ready Set Carb Counting / Plate Method, Nutrient Conversion and International Diabetes [address] Eating for People with Diabetes and Nutrition in the [employer] - fast facts about fast food    [] Self-Management  Class 3 -  Diabetes your take control guide pages 20 - 30,  type 2 diabetes and the role of GLP- 1,  Individualized Diabetes report card     [] Self-Management Class 4 - BD Booklet  Sick Day Rules and  Dinning Out Guide , recipe hand outs and tips, diabetes Cookbooks  ( when available)     []Self-Management - 3 month follow -  AADE7 Self care behaviors work sheets, Cary Bernard 4782 Support sheets,  Online resource list - March 2020      []Self-Management  Gestational - RN class -Resource materials sent out : care booklet - \" Gestational Diabetes Mellitus ( GDM) toolkit form [place] gestational diabetes postpartum care learning collaborative 2018. \"Simple Guidelines for meal planning with gestational diabetes. SMBG sheets to fax back to M weekly. BD  healthy injection site selection and rotation with 6 mm insulin syringe and 4 mm pen needle. Gestational diabetes handout from [facility] 2016. Did you have gestational diabetes when you were pregnant? Handout from [facility]  April 2014    []Self-Management Gestational - RD class - My Food Plan for Gestational diabetes    []Glucose Meter     []Insulin Kit     []Other      Encounter Type Date Start Time End Time Comments No Show Dates   Assessment 7/16/20 JW   10:15 11:30   []In Person  [x]Doxy. me with pt and wife. Telephone    Class 1 - Understanding diabetes 7/23/20rs   10 00   1100   [x] Reynaldo. me with pt     [email or web address] [email or web address] is link for email and patient uses laptop    American Diabetes Association  www. diabetes. org    Class 2- Nutrition and diabetes      []Telephone  Healthy Eating with Diabetes- Automatic Data of Diabetes and Digestive and Kidney   https://youtu. be/[email or web address]    Class 3 - Preventing Complications     []Telephone    Class 4 -  In depth Nutrition and sick day care    []Telephone  Diabetes Food hub  www. diabetesfoodhub.org     Class 5 - 3 month follow up / goal reassessment        Gestational - RN         Gestational - RD        Individual MNT         Shared Med Appt         Yearly Follow-up        Meter Instrx      How to Measure Your Blood Sugar - [facility] Patient Education  https://youtu. be/[email or web address]    Insulin Instrx      []Pen  []Vial & Syringe   BD Diabetes Care: How to Inject Insulin with a Pen Needle  https://youtu. be/[email or web address]    Diabetes Care:  How to Inject Insulin with a Syringe  https://youtu. be/9uSSBu-5eSY       DSMS Support :   [] MNT      [] Annual update     [] Starting Fresh  adults living with diabetes or pre diabetes. [address] [place], [place]    [id number]-6:30pm- on 6 week rotation  Free -  REGISTRATION IS REQUIRED - [facility] [phone] call for dates    []  Diabetes Group at  [address] - Free 6 week diabetes education support   classes - contact : Soraya Valdivia [phone]  for dates and locations      []ADA  Where do I Begin, Living with Type 2 diabetes ADA home support program    Web site: diabetes. org/living    Call: 1800 DIABETES  e-mail: [email or web address]     []  Internet web sites - ADAWeb site: www.diabetes. org       [] [facility] opens at 8 30 am daily for walkers, shops open at 10 am   [address] [place] [address]   [phone] Daily - 8:30am - 10am    3rd Tuesday of every month [facility] expert speakers visit from 9 - 10am        [] 34 [facility], [facility], [facility], [place], [facility] anderson (site rotate)  Call [phone] or visit   website: [email or web address] for more details   Check web site for updated times/ dates       [] [address]  1001 [facility], [address] [address] Tuesday and Thursday -   9 :30 am- 10:30am - ongoing   [] [address]  655 [facility], [address] [address] [facility] Thursday 11:00am - 11:45am     [] Third Wednesday Cooking  Class  Free  Registration is required     [facility]. 1100 [facility], 125 [facility] [phone] or   Email [email or web address]   Wednesdays-5:00- 6:00 pm       [] Eat Smart  Be Active & Learn How - Free Families & grandparents with children in home 0- 25 & pregnant moms          Various sites in community - call to find next session near you. [facility] extension office for future sessions - Veronika Villalobos  Email : [email or web address]. [facility]  Phone: [phone]     [] (SNAP-Ed)   - free nutrition education   - adults and youth, who are eligible for the Supplemental Nutrition Assistance Program    Various sites in community - call to find next session near you. [facility] ALEXA-CRANBERRY-YONIS SNAP-Ed  contact Cherelle Sigala, Email:[phone]           Post Education Referrals:      [] [place] Tobacco Quit information sheet and [address] [facility] , 21       [] Dental care - [facility]     [] [facility] ([facility]) link  phone number - for information and referral to [facility] AYESHA SENA  Clinically  [address] Fagan, WEIGHT MANAGEMENT        []Other  Mirela Álvarez, RN

## 2020-07-30 ENCOUNTER — HOSPITAL ENCOUNTER (OUTPATIENT)
Dept: DIABETES SERVICES | Age: 70
Setting detail: THERAPIES SERIES
Discharge: HOME | End: 2020-07-30
Payer: COMMERCIAL

## 2020-07-30 PROCEDURE — G0108 DIAB MANAGE TRN  PER INDIV: HCPCS

## 2020-07-30 NOTE — PROGRESS NOTES
This visit is a TeleHealth encounter (During NEIKY-05 public health emergency). Pursuant to the emergency declaration under the [facility] [address] and the [employer] Act, this Virtual Visit was conducted with patient's (and/or legal guardian's) consent, to reduce the patient's risk of exposure to COVID-19 and provide necessary medical care. The patient (and/or legal guardian) has also been advised to contact this office for worsening conditions or problems, and seek emergency medical treatment and/or call 911 if deemed necessary. Patient identification was verified at the start of the visit: Yes    Total time spent for this encounter: 1 hour 10 am - 11 am     Services were provided through a video synchronous discussion virtually to substitute for in-person clinic visit. Patient and provider were located at their individual home/office.    - patient e-mail to send [email or web address]. me link = [email or web address]      Also see Diabetic Screening       MEDICAL HISTORY:  Past Medical History:   Diagnosis Date    [facility]ylosing spondylitis ([facility]     Arthritis     Asthma not dependent on systemic steroids without complication 49/65/7053    CAD (coronary artery disease)     Hypertension     DEB treated with BiPAP 12/07/2017    Pulmonary hypertension ([facility] 44/54/8091    Silicosis ([facility] 40/0028    STEMI (ST elevation myocardial infarction) ([facility] 12/9/2017     No family history on file.   Penicillins   Immunization History   Administered Date(s) Administered    Influenza, Quadv, IM, PF (6 mo and older Fluzone, Flulaval, Fluarix, and 3 yrs and older Afluria) 12/10/2017     Current Medications  Current Outpatient Medications   Medication Sig Dispense Refill    Loratadine (CLARITIN PO) Take 10 mg by mouth daily      Blood Glucose Monitoring Suppl (BLOOD GLUCOSE SYSTEM MICHAELA) KIT Test once a day 1 kit 0    blood glucose monitor strips Test 1 time a day & as needed for symptoms of irregular blood glucose. 100 strip 3    Lancets MISC 1 each by Does not apply route 2 times daily 100 each 5    metFORMIN (GLUCOPHAGE) 500 MG tablet Take 1 tablet by mouth 2 times daily (with meals) Start with one tablet once daily for two weeks then increase to twice daily thereafter. 60 tablet 5    meloxicam (MOBIC) 15 MG tablet Take 1 tablet by mouth daily 90 tablet 1    metoprolol succinate (TOPROL XL) 50 MG extended release tablet Take 1 tablet by mouth daily 90 tablet 1    citalopram (CELEXA) 40 MG tablet Take 1 tablet by mouth daily 90 tablet 1    amLODIPine (NORVASC) 2.5 MG tablet TAKE 1 Tablet  BY MOUTH EVERY DAY 30 tablet 0    nitroGLYCERIN (NITROSTAT) 0.4 MG SL tablet Place 0.4 mg under the tongue every 5 minutes as needed for Chest pain up to max of 3 total doses. If no relief after 1 dose, call 911.  lisinopril (PRINIVIL;ZESTRIL) 10 MG tablet Take 1 tablet by mouth daily 30 tablet 3    atorvastatin (LIPITOR) 80 MG tablet Take 1 tablet by mouth nightly 30 tablet 3    ticagrelor (BRILINTA) 90 MG TABS tablet Take 1 tablet by mouth 2 times daily 60 tablet 11    aspirin 81 MG chewable tablet Take 1 tablet by mouth daily 30 tablet 3    montelukast (SINGULAIR) 10 MG tablet Take 10 mg by mouth nightly      albuterol (PROVENTIL) (2.5 MG/3ML) 0.083% nebulizer solution Take 2.5 mg by nebulization 4 times daily      Fluticasone Furoate-Vilanterol (BREO ELLIPTA) 200-25 MCG/INH AEPB Inhale 200 mcg into the lungs daily       No current facility-administered medications for this encounter.    :     Comments:  Allergies:     Allergies   Allergen Reactions    Penicillins        A1C blood level   Lab Results   Component Value Date    LABA1C 9.9 (H) 06/22/2020    LABA1C 6.6 (H) 12/09/2017     Lab Results   Component Value Date    CREATININE 0.75 06/16/2020       Blood pressure   BP Readings from Last 3 Encounters:   01/30/20 121/60   12/11/17 124/64        Cholesterol  Lab Results   Component Value Date    LDLCHOLESTEROL 99 12/10/2017        Diabetes Self- Management Education Record    Participant Name: Dario Phillip  Referring Provider: Vicente Quintanilla MD   Assessment/Evaluation Ratings:  1=Needs Instruction   4=Demonstrates Understanding/Competency  2=Needs Review   NC=Not Covered    3=Comprehends Key Points  N/A=Not Applicable  Topics/Learning Objectives Pre-session Assess Date:  7/16/20 Gurjit Vickers. Date Reinforce Date Post- session Eval Comments   Diabetes disease process & Treatment process: Define diabetes & pre-diabetes; Identify own type of diabetes; role of the pancreas; signs/symptoms; diagnostic criteria; prevention & treatment options; contributing factors. 1 7/23/20rs    Newly diagnosed with DM although A1c 6.6% 12/17. States dad and grandfather had DM. Incorporating nutritional management into lifestyle: Describe effect of type, amount & timing of food on blood glucose; Describe basic meal planning techniques & current nutrition guideline   1- has lost 9# since learning of DM, started eating breakfast cut out ice cream and snacks. Eating 3x/day regular times. Wife cooks, shops. Meals balanced, pt likes most foods. . 7/30/20 JW continuing with changed habits. Praised progress. Answered questions re: foods/portions. Showed healthy eating video. What to eat - Food groups, When to eat - timing of meals and snacks, and How much to eat - portions control. Suggest  2200 calories/ day   CHO choices/ meal  4-5 cho/meal, 2-1 cho/snack   CHO choices/  day   grams of protein /day   gram of fat /day     Correctly read food labels & demonstrate CHO counting & portion control with personalized meal plan. Identify dining out strategies, & dietary sick day guidelines.    1 7/30/20 JW      Incorporating physical activity into lifestyle:   Verbalize effect of exercise on blood glucose levels; benefits of regular exercise; safety considerations; contraindications; maintenance of activity. 1 7/23/20rs    Limited by weight, arthritis and breathing issues but pt and wife have been walking at night after supper. Gets light activity. - patient is targeting being more active after dinner daily    Using medications safely:  Identify effects of diabetes medicines on blood glucose levels; List diabetes medication taken, action & side effects;    1 7/30/20 JW   500mg metformin bid   Insulin / Injectable - Appropriate injection sites; proper storage; supplies needed; proper technique; safe needle disposal guidelines. na       Monitoring blood glucose, interpreting and using results:  Identify recommended & personal blood glucose targets; importance of testing; testing supplies; HgbA1C target levels; Factors affecting blood glucose; Importance of logging blood glucose levels for pattern recognition; ketone testing; safe lancet disposal.   2 7/23/20rs    Checks fbs which have been <150 and many less than 130    - 7/23/20stated in last week - had BG checks 2 hours post meal - 185 and 192   Fasting BG at 130's- 140's    Prevention, detection & treatment of acute complications:  Identify symptoms of hyper & hypoglycemia, and prevention & treatment strategies. 1 7/23/20rs    Has had some symptoms of hi and low bs  Feeling fatigued and needing a nap in the afternoons. Discussed sleep, bs (and check there), talk with MD   Describe sick day guidelines & indications for  physician notification. Identify short term consequences of poor control. Disaster preparedness strategies    1       Prevention, detection & treatment of chronic complications:  Define the natural course of diabetes & describe the relationship of blood glucose levels to long term complications of diabetes. Identify preventative measures & standards of care. 1       Developing strategies to address psychosocial issues:  Describe feelings about living with diabetes;  Describe how stress, depression & anxiety affect blood glucose; Identify coping strategies; Identify support needed & support network available. 1 7/23/20rs    Has positive attitude and good support from his wife and daughter. Has 2nd home in [address] [place] [address]. Motivated to make changes. Developing strategies to promote health/change behavior: Identify 7 self-care behaviors; Personal health risk factors; Benefits, challenges & strategies for behavioral change;    1 7/23/20rs         Individualized goal selection. My goal , to help me improve my health, I will:   1. Continue to work physical activity into daily routine. 2.       Plan  Follow-up Appointments planned 7/23 and 7/30 and further TBD     Instruction Method: [x]Lecture/Discussion  [x]Power Point Presentation  [x]Handouts  []Return Demonstration    Education Materials/Equipment Provided (VIA Mail for phone visits)  :    [x]Self-Management - Initial assessment - Enrolment in to ADA  Where do I Begin, Living with Type 2 diabetes ADA home support program and  handout on diabetes education classes. 7/16/20 NAZARIO    [x]Self-Management  Class 1 -Self-Management  Class 1 - \"Diabetes - your take control guide\" - ADA booklet -  o  \"ready, set, start counting\" teaching sheet in diet section   o  GLP-1 understanding 8 core deficits puzzle sheet in the medication section  o one day food diary and envelope for return of diet HX   o  You tube and website resource sheet-Understanding Type 2 Diabetes from Animated Diabetes Patient [email or web address] [email or web address]      [x] Self-Management  Class 2 - Meal Plan and handout for serving sizes, smarter snacking, Ready Set Carb Counting / Plate Method, Nutrient Conversion and International Diabetes [address] Eating for People with Diabetes and Nutrition in the [employer] - fast facts about fast food7/30/20 JW    [] Self-Management  Class 3 -  Diabetes your take control guide pages 20 - 30,  type 2 diabetes and the role of GLP- 1,  Individualized Diabetes report card     [] Self-Management Class 4 - BD Booklet  Sick Day Rules and  Dinning Out Guide , recipe hand outs and tips, diabetes Cookbooks  ( when available)     []Self-Management - 3 month follow -  AADE7 Self care behaviors work sheets, 2020 [employer],  Online resource list - March 2020      []Self-Management  Gestational - RN class -Resource materials sent out : care booklet - \" Gestational Diabetes Mellitus ( GDM) toolkit form [place] gestational diabetes postpartum care learning collaborative 2018. \"Simple Guidelines for meal planning with gestational diabetes. SMBG sheets to fax back to [facility] weekly. BD  healthy injection site selection and rotation with 6 mm insulin syringe and 4 mm pen needle. Gestational diabetes handout from [facility] 2016. Did you have gestational diabetes when you were pregnant? Handout from [facility]  April 2014    []Self-Management Gestational - RD class - My Food Plan for Gestational diabetes    []Glucose Meter     []Insulin Kit     []Other      Encounter Type Date Start Time End Time Comments No Show Dates   Assessment 7/16/20 JW   10:15 11:30   []In Person  [x]Doxy. me with pt and wife. Telephone    Class 1 - Understanding diabetes 7/23/20rs   10 00   1100   [x] Doxy. me with pt     [email or web address]. [email or web address] is link for email and patient uses laptop    American Diabetes Association  www. diabetes. org    Class 2- Nutrition and diabetes   7/30/20 JW 10:00 11:00 [x]Doxy. me with pt and wife Healthy Eating with Diabetes- Automatic Data of Diabetes and Digestive and Kidney   [email or web address] be/[email or web address]    Class 3 - Preventing Complications     []Telephone    Class 4 -  In depth Nutrition and sick day care    []Telephone  Diabetes Food hub  www. diabetesfoodhub.org     Class 5 - 3 month follow up / goal reassessment        Gestational - RN         Gestational - RD        Individual MNT         Shared Med Appt         Yearly [address] [facility] Thursday 11:00am - 11:45am     [] Third Wednesday Cooking  Class  Free  Registration is required     [facility]. [facility], [address] [place] [address] [phone] or   Email [email or web address]   Wednesdays-5:00- 6:00 pm       [] Eat Smart  Be Active & Learn How - Free   Families & grandparents with children in home 0- 25 & pregnant moms          Various sites in community - call to find next session near you. OS extension office for future sessions - Efraín Leonard  Email : [email or web address]. [facility]  Phone: [phone]     [] (SNAP-Ed)   - free nutrition education   - adults and youth, who are eligible for the Supplemental Nutrition Assistance Program    Various sites in community - call to find next session near you. [facility] SNAP-Ed  contact [employer], Email:[phone]           Post Education Referrals:      [] [place] Tobacco Quit information sheet and [address] [facility] , 21       [] Dental care - [facility]     [] [facility]) link  phone number - for information and referral to [address], WOUND, WEIGHT MANAGEMENT        []Other  Ignacio Linares RD, LD

## 2020-08-06 ENCOUNTER — APPOINTMENT (OUTPATIENT)
Dept: DIABETES SERVICES | Age: 70
End: 2020-08-06
Payer: COMMERCIAL

## 2020-08-13 ENCOUNTER — HOSPITAL ENCOUNTER (OUTPATIENT)
Dept: DIABETES SERVICES | Age: 70
Setting detail: THERAPIES SERIES
Discharge: HOME | End: 2020-08-13
Payer: COMMERCIAL

## 2020-08-13 PROCEDURE — G0108 DIAB MANAGE TRN  PER INDIV: HCPCS

## 2020-08-13 NOTE — PROGRESS NOTES
This visit is a TeleHealth encounter (During IYVDZ-06 public health emergency). Pursuant to the emergency declaration under the [facility], [address] [place] [address] and the [employer] Act, this Virtual Visit was conducted with patient's (and/or legal guardian's) consent, to reduce the patient's risk of exposure to COVID-19 and provide necessary medical care. The patient (and/or legal guardian) has also been advised to contact this office for worsening conditions or problems, and seek emergency medical treatment and/or call 911 if deemed necessary. Patient identification was verified at the start of the visit: Yes    Total time spent for this encounter: 1 hour 10 am - 11 am   8/13/20CB 1 hour by video and phone. Patient up [place] at cabin and connection poor    Services were provided through a video synchronous discussion virtually to substitute for in-person clinic visit. Patient and provider were located at their individual home/office.    - patient e-mail to send [email or web address] link = [email or web address]      Also see Diabetic Screening       MEDICAL HISTORY:  Past Medical History:   Diagnosis Date    [facility]ylosing spondyl[facility] ([facility]     Arthritis     Asthma not dependent on systemic steroids without complication 07/01/5634    CAD (coronary artery disease)     Hypertension     DEB treated with BiPAP 12/07/2017    Pulmonary hypertension ([facility] 12/24/9167    Silicosis ([facility] 30/4485    STEMI (ST elevation myocardial infarction) ([facility] 12/9/2017     No family history on file.   Penicillins   Immunization History   Administered Date(s) Administered    Influenza, Quadv, IM, PF (6 mo and older Fluzone, Flulaval, Fluarix, and 3 yrs and older Afluria) 12/10/2017     Current Medications  Current Outpatient Medications   Medication Sig Dispense Refill    Loratadine (CLARITIN PO) Take 10 mg by mouth daily      Blood Glucose Monitoring Suppl (BLOOD GLUCOSE SYSTEM MICHAELA) KIT Test once a day 1 kit 0    blood glucose monitor strips Test 1 time a day & as needed for symptoms of irregular blood glucose. 100 strip 3    Lancets MISC 1 each by Does not apply route 2 times daily 100 each 5    metFORMIN (GLUCOPHAGE) 500 MG tablet Take 1 tablet by mouth 2 times daily (with meals) Start with one tablet once daily for two weeks then increase to twice daily thereafter. 60 tablet 5    meloxicam (MOBIC) 15 MG tablet Take 1 tablet by mouth daily 90 tablet 1    metoprolol succinate (TOPROL XL) 50 MG extended release tablet Take 1 tablet by mouth daily 90 tablet 1    citalopram (CELEXA) 40 MG tablet Take 1 tablet by mouth daily 90 tablet 1    amLODIPine (NORVASC) 2.5 MG tablet TAKE 1 Tablet  BY MOUTH EVERY DAY 30 tablet 0    nitroGLYCERIN (NITROSTAT) 0.4 MG SL tablet Place 0.4 mg under the tongue every 5 minutes as needed for Chest pain up to max of 3 total doses. If no relief after 1 dose, call 911.  lisinopril (PRINIVIL;ZESTRIL) 10 MG tablet Take 1 tablet by mouth daily 30 tablet 3    atorvastatin (LIPITOR) 80 MG tablet Take 1 tablet by mouth nightly 30 tablet 3    ticagrelor (BRILINTA) 90 MG TABS tablet Take 1 tablet by mouth 2 times daily 60 tablet 11    aspirin 81 MG chewable tablet Take 1 tablet by mouth daily 30 tablet 3    montelukast (SINGULAIR) 10 MG tablet Take 10 mg by mouth nightly      albuterol (PROVENTIL) (2.5 MG/3ML) 0.083% nebulizer solution Take 2.5 mg by nebulization 4 times daily      Fluticasone Furoate-Vilanterol (BREO ELLIPTA) 200-25 MCG/INH AEPB Inhale 200 mcg into the lungs daily       No current facility-administered medications for this encounter.    :     Comments:  Allergies:     Allergies   Allergen Reactions    Penicillins        A1C blood level   Lab Results   Component Value Date    LABA1C 9.9 (H) 06/22/2020    LABA1C 6.6 (H) 12/09/2017     Lab Results   Component Value Date    CREATININE 0.75 06/16/2020 Blood pressure   BP Readings from Last 3 Encounters:   01/30/20 121/60   12/11/17 124/64        Cholesterol  Lab Results   Component Value Date    LDLCHOLESTEROL 99 12/10/2017        Diabetes Self- Management Education Record    Participant Name: Lorenzo Wilson  Referring Provider: Kim Payton MD   Assessment/Evaluation Ratings:  1=Needs Instruction   4=Demonstrates Understanding/Competency  2=Needs Review   NC=Not Covered    3=Comprehends Key Points  N/A=Not Applicable  Topics/Learning Objectives Pre-session Assess Date:  7/16/20 Zeferino Adas. Date Reinforce Date Post- session Eval Comments   Diabetes disease process & Treatment process: Define diabetes & pre-diabetes; Identify own type of diabetes; role of the pancreas; signs/symptoms; diagnostic criteria; prevention & treatment options; contributing factors. 1 7/23/20rs  8/13/20CB  Newly diagnosed with DM although A1c 6.6% 12/17. States dad and grandfather had DM. Incorporating nutritional management into lifestyle: Describe effect of type, amount & timing of food on blood glucose; Describe basic meal planning techniques & current nutrition guideline   1- has lost 9# since learning of DM, started eating breakfast cut out ice cream and snacks. Eating 3x/day regular times. Wife cooks, shops. Meals balanced, pt likes most foods. . 7/30/20 JW continuing with changed habits. Praised progress. Answered questions re: foods/portions. Showed healthy eating video. What to eat - Food groups, When to eat - timing of meals and snacks, and How much to eat - portions control. Suggest  2200 calories/ day   CHO choices/ meal  4-5 cho/meal, 2-1 cho/snack   CHO choices/  day   grams of protein /day   gram of fat /day     Correctly read food labels & demonstrate CHO counting & portion control with personalized meal plan. Identify dining out strategies, & dietary sick day guidelines.    1 7/30/20 JW      Incorporating physical activity into lifestyle:   Verbalize effect of exercise on blood glucose levels; benefits of regular exercise; safety considerations; contraindications; maintenance of activity. 1 7/23/20rs  8/13/20CB  Limited by weight, arthritis and breathing issues but pt and wife have been walking at night after supper. Gets light activity. - patient is targeting being more active after dinner daily     Has been more sedentary after diner up Yosvany but will try to walk after eating as am BG still >130 majority of times8/13/20CB   Using medications safely:  Identify effects of diabetes medicines on blood glucose levels; List diabetes medication taken, action & side effects;    1 7/30/20 JW 8/13/20CB  500mg metformin bid   Insulin / Injectable - Appropriate injection sites; proper storage; supplies needed; proper technique; safe needle disposal guidelines. na 8/13/20CB   NA but remembers Grandfather taking8/13/20CB   Monitoring blood glucose, interpreting and using results:  Identify recommended & personal blood glucose targets; importance of testing; testing supplies; HgbA1C target levels; Factors affecting blood glucose; Importance of logging blood glucose levels for pattern recognition; ketone testing; safe lancet disposal.   2 7/23/20rs  8/13/20CB  Checks fbs which have been <150 and many less than 130    - 7/23/20stated in last week - had BG checks 2 hours post meal - 185 and 192   Fasting BG at 130's- 140's     Fasting still 135-150 and 2h 130-150 with 104 lowest once and 156 highest.  A1C 9.9% on 6/22/20. No F/U appt yet for PCP or A1C so encouraged to call to schedule for Sept.8/13/20CB   Prevention, detection & treatment of acute complications:  Identify symptoms of hyper & hypoglycemia, and prevention & treatment strategies. 1 7/23/20rs    Has had some symptoms of hi and low bs  Feeling fatigued and needing a nap in the afternoons.  Discussed sleep, bs (and check there), talk with MD   Describe sick day guidelines & indications for  physician notification. Identify short term consequences of poor control. Disaster preparedness strategies    1       Prevention, detection & treatment of chronic complications:  Define the natural course of diabetes & describe the relationship of blood glucose levels to long term complications of diabetes. Identify preventative measures & standards of care. 1 8/13/20CB   Dos get annual eye exam, wife checks feet over daily, maintains good dental hygiene as daughter is hygienist with her  the dentist, recently saw cardiologist for cath and only 30% blockage but no recent lipid levels since 2017 which were good then. Also takes 80 mg Lipitor. 8/13/20CB   Developing strategies to address psychosocial issues:  Describe feelings about living with diabetes; Describe how stress, depression & anxiety affect blood glucose; Identify coping strategies; Identify support needed & support network available. 1 7/23/20rs  8/13/20CB  Has positive attitude and good support from his wife and daughter. Has 2nd home in [address] [place] [address]. Motivated to make changes. Wife on call and very supportive. Good family support. 8/13/20CB   Developing strategies to promote health/change behavior: Identify 7 self-care behaviors; Personal health risk factors; Benefits, challenges & strategies for behavioral change;    1 7/23/20rs         Individualized goal selection. My goal , to help me improve my health, I will:   1. Continue to work physical activity into daily routine. 2.       Plan  Follow-up Appointments planned 7/23 and 7/30 and further TBD     Instruction Method: [x]Lecture/Discussion  [x]Power Point Presentation  [x]Handouts  []Return Demonstration    Education Materials/Equipment Provided (VIA Mail for phone visits)  :    [x]Self-Management - Initial assessment - Enrolment in to ADA  Where do I Begin, Living with Type 2 diabetes ADA home support program and  handout on diabetes education classes.  7/16/20 NAZARIO    [x]Self-Management  Class 1 -Self-Management  Class 1 - \"Diabetes - your take control guide\" - ADA booklet -  o  \"ready, set, start counting\" teaching sheet in diet section   o  GLP-1 understanding 8 core deficits puzzle sheet in the medication section  o one day food diary and envelope for return of diet HX   o  You tube and website resource sheet-Understanding Type 2 Diabetes from Animated Diabetes Patient [email or web address]      [x] Self-Management  Class 2 - Meal Plan and handout for serving sizes, smarter snacking, Ready Set Carb Counting / Plate Method, Nutrient Conversion and International Diabetes [address] Eating for People with Diabetes and Nutrition in the [employer] - fast facts about fast food7/30/20 NZAARIO    [x] Self-Management  Class 3 -  Diabetes your take control guide pages 21 - 30,  type 2 diabetes and the role of GLP- 1,  Individualized Diabetes report card 8/13/20CB  Mailed out report card with handout \"Know your numbers\"    [] Self-Management Class 4 - BD Booklet  Sick Day [place] and  [address] , recipe hand outs and tips, diabetes Cookbooks  ( when available)     []Self-Management - 3 month follow -  AADE7 Self care behaviors work sheets, 2020 [employer],  Online resource list - March 2020      []Self-Management  Gestational - RN class -Resource materials sent out : care booklet - \" Gestational Diabetes Mellitus ( GDM) toolkit form [place] gestational diabetes postpartum care learning collaborative 2018. \"Simple Guidelines for meal planning with gestational diabetes. SMBG sheets to fax back to MFM weekly. BD  healthy injection site selection and rotation with 6 mm insulin syringe and 4 mm pen needle. Gestational diabetes handout from [facility]-JUAN 2016. Did you have gestational diabetes when you were pregnant?  Handout from [facility]  April 2014    []Self-Management Gestational - RD class - My Food Plan for Gestational diabetes    []Glucose Meter     []Insulin Kit     []Other      Encounter Type Date Start Time End Time Comments No Show Dates   Assessment 7/16/20 JW   10:15 11:30   []In Person  [x]Doxy. me with pt and wife. Telephone    Class 1 - Understanding diabetes 7/23/20rs   10 00   1100   [x] Doxy. me with pt     [email or web address] is link for email and patient uses laptop    American Diabetes Association  www. diabetes. org    Class 2- Nutrition and diabetes   7/30/20 JW 10:00 11:00 [x]Doxy. me with pt and wife Healthy Eating with Diabetes- [facility] of Diabetes and Digestive and Kidney   [email or web address] [email or web address]    Class 3 - Preventing Complications     []Telephone    Class 4 -  In depth Nutrition and sick day care    []Telephone  Diabetes Food hub  www. diabetesfoodYupi Studiosb.org     Class 5 - 3 month follow up / goal reassessment        Gestational - RN         Gestational - RD        Individual MNT         Shared Med Appt         Yearly Follow-up        Meter Instrx      How to Measure Your Blood Sugar - [facility] Patient Education  [email or web address]    Insulin Instrx      []Pen  []Vial & Syringe   BD Diabetes Care: How to Inject Insulin with a Pen Needle  [email or web address]    Diabetes Care: How to Inject Insulin with a Syringe  [email or web address]       DSMS Support :   [] MNT      [] Annual update     [] Starting Fresh  adults living with diabetes or pre diabetes. [address] [place], [place]    [id number]-9:30pm- on 6 week rotation  Free -  REGISTRATION IS REQUIRED - PVTW [phone] call for dates    []  Diabetes Group at  [address] - Free 6 week diabetes education support   classes - contact : Soraya Valdivia [phone]  for dates and locations      []ADA  Where do I Begin, Living with Type 2 diabetes ADA home support program    Web site: diabetes. org/living    Call: 1800 DIABETES  e-mail: [email or web address]     []  Internet web sites - ADAWeb site: www.diabetes. org       [] [facility] opens at 8 30 am daily for walkers, shops open at 10 am   [address] [facility], [address] [facility]   [phone] Daily - 8:30am - 10am    3rd Tuesday of every month [facility] expert speakers visit from 9 - 10am        [] [facility], [facility], [facility], [place], [facility] anderson (site rotate)  Call [phone] or visit   website: [email or web address] for more details   Check web site for updated times/ dates       [] [address]  [address] [facility], [address] [address] [place] [address] Tuesday and Thursday -   9 :30 am- 10:30am - ongoing   [] [address]  [address] [place] [address], [address] [facility] [address] [facility] [address] [address] [facility] Thursday 11:00am - 11:45am     [] Third Wednesday Cooking  Class  Free  Registration is required     [address] [facility]. [address] [facility], [facility] [phone] or   Email [email or web address]   Wednesdays-5:00- 6:00 pm       [] Eat Smart  Be Active & Learn How - Free   Families & grandparents with children in home 0- 25 & pregnant moms          Various sites in community - call to find next session near you. [facility] extension office for future sessions - Jaime Ospina  Email : [email or web address]  Phone: [phone]     [] (SNAP-Ed)   - free nutrition education   - adults and youth, who are eligible for the Supplemental Nutrition Assistance Program    Various sites in community - call to find next session near you. [facility] ALEXA-CRANBERRY-ER SNAP-Ed  contact Rylee Coburn, Email:[email or web address]           Post Education Referrals:      [] [place] Tobacco Quit information sheet and [address] [facility] , [address]      [] Dental care - Dental care of [facility]     [] [facility]) link  phone number - for information and referral to [facility] Meron TORRES  Clinically  [address] Fagan, WEIGHT MANAGEMENT        []Other  Lolita Mendez RN

## 2020-08-20 ENCOUNTER — HOSPITAL ENCOUNTER (OUTPATIENT)
Dept: DIABETES SERVICES | Age: 70
Setting detail: THERAPIES SERIES
Discharge: HOME | End: 2020-08-20
Payer: COMMERCIAL

## 2020-08-20 PROCEDURE — G0108 DIAB MANAGE TRN  PER INDIV: HCPCS

## 2020-08-20 NOTE — PROGRESS NOTES
This visit is a TeleHealth encounter (During WFQZL-50 public health emergency). Pursuant to the emergency declaration under the [address] [place] [address] and the [employer] Act, this Virtual Visit was conducted with patient's (and/or legal guardian's) consent, to reduce the patient's risk of exposure to COVID-19 and provide necessary medical care. The patient (and/or legal guardian) has also been advised to contact this office for worsening conditions or problems, and seek emergency medical treatment and/or call 911 if deemed necessary. Patient identification was verified at the start of the visit: Yes    Total time spent for this encounter: 1 hour 10 am - 11 am     -- this encounter was done as one on one virtual  visit as no group sessions being offered for 2 months due to covid - 19 pandemic      Services were provided through a video synchronous discussion virtually to substitute for in-person clinic visit. Patient and provider were located at their individual home/office.    - patient e-mail to send gutierrez barcenas link = [email or web address]      Also see Diabetic Screening       MEDICAL HISTORY:  Past Medical History:   Diagnosis Date    [facility]ondyl[facility]     Arthritis     Asthma not dependent on systemic steroids without complication 06/51/2213    CAD (coronary artery disease)     Hypertension     DEB treated with BiPAP 12/07/2017    Pulmonary hypertension ([facility] 17/53/8583    Silicosis ([facility] 51/1760    [facility] (ST elevation myocardial infarction) [facility] 12/9/2017     No family history on file.   Penicillins   Immunization History   Administered Date(s) Administered    Influenza, Quadv, IM, PF (6 mo and older Fluzone, Flulaval, Fluarix, and 3 yrs and older Afluria) 12/10/2017     Current Medications  Current Outpatient Medications   Medication Sig Dispense Refill    Loratadine (CLARITIN PO) Take 10 mg by mouth daily      Blood Glucose Monitoring Suppl (BLOOD GLUCOSE SYSTEM MICHAELA) KIT Test once a day 1 kit 0    blood glucose monitor strips Test 1 time a day & as needed for symptoms of irregular blood glucose. 100 strip 3    Lancets MISC 1 each by Does not apply route 2 times daily 100 each 5    metFORMIN (GLUCOPHAGE) 500 MG tablet Take 1 tablet by mouth 2 times daily (with meals) Start with one tablet once daily for two weeks then increase to twice daily thereafter. 60 tablet 5    meloxicam (MOBIC) 15 MG tablet Take 1 tablet by mouth daily 90 tablet 1    metoprolol succinate (TOPROL XL) 50 MG extended release tablet Take 1 tablet by mouth daily 90 tablet 1    citalopram (CELEXA) 40 MG tablet Take 1 tablet by mouth daily 90 tablet 1    amLODIPine (NORVASC) 2.5 MG tablet TAKE 1 Tablet  BY MOUTH EVERY DAY 30 tablet 0    nitroGLYCERIN (NITROSTAT) 0.4 MG SL tablet Place 0.4 mg under the tongue every 5 minutes as needed for Chest pain up to max of 3 total doses. If no relief after 1 dose, call 911.  lisinopril (PRINIVIL;ZESTRIL) 10 MG tablet Take 1 tablet by mouth daily 30 tablet 3    atorvastatin (LIPITOR) 80 MG tablet Take 1 tablet by mouth nightly 30 tablet 3    ticagrelor (BRILINTA) 90 MG TABS tablet Take 1 tablet by mouth 2 times daily 60 tablet 11    aspirin 81 MG chewable tablet Take 1 tablet by mouth daily 30 tablet 3    montelukast (SINGULAIR) 10 MG tablet Take 10 mg by mouth nightly      albuterol (PROVENTIL) (2.5 MG/3ML) 0.083% nebulizer solution Take 2.5 mg by nebulization 4 times daily      Fluticasone Furoate-Vilanterol (BREO ELLIPTA) 200-25 MCG/INH AEPB Inhale 200 mcg into the lungs daily       No current facility-administered medications for this encounter.    :     Comments:  Allergies:     Allergies   Allergen Reactions    Penicillins        A1C blood level   Lab Results   Component Value Date    LABA1C 9.9 (H) 06/22/2020    LABA1C 6.6 (H) 12/09/2017     Lab Results Component Value Date    CREATININE 0.75 06/16/2020       Blood pressure   BP Readings from Last 3 Encounters:   01/30/20 121/60   12/11/17 124/64        Cholesterol  Lab Results   Component Value Date    LDLCHOLESTEROL 99 12/10/2017        Diabetes Self- Management Education Record    Participant Name: Estefany Knight  Referring Provider: Robby Jones MD   Assessment/Evaluation Ratings:  1=Needs Instruction   4=Demonstrates Understanding/Competency  2=Needs Review   NC=Not Covered    3=Comprehends Key Points  N/A=Not Applicable  Topics/Learning Objectives Pre-session Assess Date:  7/16/20 Jacklyn Number. Date Reinforce Date Post- session Eval Comments   Diabetes disease process & Treatment process: Define diabetes & pre-diabetes; Identify own type of diabetes; role of the pancreas; signs/symptoms; diagnostic criteria; prevention & treatment options; contributing factors. 1 7/23/20rs  8/13/20CB  Newly diagnosed with DM although A1c 6.6% 12/17. States dad and grandfather had DM. Incorporating nutritional management into lifestyle: Describe effect of type, amount & timing of food on blood glucose; Describe basic meal planning techniques & current nutrition guideline   1- has lost 9# since learning of DM, started eating breakfast cut out ice cream and snacks. Eating 3x/day regular times. Wife cooks, shops. Meals balanced, pt likes most foods. . 7/30/20 JW continuing with changed habits. Praised progress. Answered questions re: foods/portions. Showed healthy eating video. 8/20/20RS  What to eat - Food groups, When to eat - timing of meals and snacks, and How much to eat - portions control. Suggest  2200 calories/ day   CHO choices/ meal  4-5 cho/meal, 2-1 cho/snack   CHO choices/  day   grams of protein /day   gram of fat /day     Correctly read food labels & demonstrate CHO counting & portion control with personalized meal plan. Identify dining out strategies, & dietary sick day guidelines.    1 7/30/20 JW 8/20/20RS- ID total CHO on food labels to ct in total CHO meal targets     Incorporating physical activity into lifestyle:   Verbalize effect of exercise on blood glucose levels; benefits of regular exercise; safety considerations; contraindications; maintenance of activity. 1 7/23/20rs  8/13/20CB  Limited by weight, arthritis and breathing issues but pt and wife have been walking at night after supper. Gets light activity. - patient is targeting being more active after dinner daily     Has been more sedentary after diner up [place] but will try to walk after eating as am BG still >130 majority of times8/13/20CB   Using medications safely:  Identify effects of diabetes medicines on blood glucose levels; List diabetes medication taken, action & side effects;    1 7/30/20 JW 8/13/20CB  500mg metformin bid   Insulin / Injectable - Appropriate injection sites; proper storage; supplies needed; proper technique; safe needle disposal guidelines. na 8/13/20CB   NA but remembers Grandfather taking8/13/20CB   Monitoring blood glucose, interpreting and using results:  Identify recommended & personal blood glucose targets; importance of testing; testing supplies; HgbA1C target levels; Factors affecting blood glucose; Importance of logging blood glucose levels for pattern recognition; ketone testing; safe lancet disposal.   2 7/23/20rs  8/13/20CB  Checks fbs which have been <150 and many less than 130    - 7/23/20stated in last week - had BG checks 2 hours post meal - 185 and 192   Fasting BG at 130's- 140's     Fasting still 135-150 and 2h 130-150 with 104 lowest once and 156 highest.  A1C 9.9% on 6/22/20. No F/U appt yet for PCP or A1C so encouraged to call to schedule for Sept.8/13/20CB   Prevention, detection & treatment of acute complications:  Identify symptoms of hyper & hypoglycemia, and prevention & treatment strategies.     1 7/23/20rs    Has had some symptoms of hi and low bs  Feeling fatigued and needing a nap in the afternoons. Discussed sleep, bs (and check there), talk with MD   Describe sick day guidelines & indications for  physician notification. Identify short term consequences of poor control. Disaster preparedness strategies    1 8/20/20RS      Prevention, detection & treatment of chronic complications:  Define the natural course of diabetes & describe the relationship of blood glucose levels to long term complications of diabetes. Identify preventative measures & standards of care. 1 8/13/20CB   Dos get annual eye exam, wife checks feet over daily, maintains good dental hygiene as daughter is hygienist with her  the dentist, recently saw cardiologist for cath and only 30% blockage but no recent lipid levels since 2017 which were good then. Also takes 80 mg Lipitor. 8/13/20CB   Developing strategies to address psychosocial issues:  Describe feelings about living with diabetes; Describe how stress, depression & anxiety affect blood glucose; Identify coping strategies; Identify support needed & support network available. 1 7/23/20rs 8/13/20CB  Has positive attitude and good support from his wife and daughter. Has 2nd home in [address] [place] [address]. Motivated to make changes. Wife on call and very supportive. Good family support. 8/13/20CB   Developing strategies to promote health/change behavior: Identify 7 self-care behaviors; Personal health risk factors; Benefits, challenges & strategies for behavioral change;    1 7/23/20rs         Individualized goal selection. My goal , to help me improve my health, I will:   1. Continue to work physical activity into daily routine.   2. Healthy eating - CHO count meals and use healthy plate method       Plan  Follow-up Appointments planned - 12 weeks via     Instruction Method: [x]Lecture/Discussion  [x]Power Point Presentation  [x]Handouts  []Return Demonstration    Education Materials/Equipment Provided (VIA Mail for phone visits)  : [x]Self-Management - Initial assessment - Enrolment in to ADA  Where do I Begin, Living with Type 2 diabetes ADA home support program and  handout on diabetes education classes. 7/16/20 JW    [x]Self-Management  Class 1 -Self-Management  Class 1 - \"Diabetes - your take control guide\" - ADA booklet -  o  \"ready, set, start counting\" teaching sheet in diet section   o  GLP-1 understanding 8 core deficits puzzle sheet in the medication section  o one day food diary and envelope for return of diet HX   o  You tube and website resource sheet-Understanding Type 2 Diabetes from Animated Diabetes Patient [email or web address]      [x] Self-Management  Class 2 - Meal Plan and handout for serving sizes, smarter snacking, Ready Set Carb Counting / Plate Method, Nutrient Conversion and International Diabetes [address] Eating for People with Diabetes and Nutrition in the [employer] - fast facts about fast food7/30/20 JW    [x] Self-Management  Class 3 -  Diabetes your take control guide pages 20 - 30,  type 2 diabetes and the role of GLP- 1,  Individualized Diabetes report card 8/13/20CB  Mailed out report card with handout \"Know your numbers\"    [x] Self-Management Class 4 - BD Booklet  Sick Day [place] and  [address] , recipe hand outs and tips, diabetes Cookbooks  ( when available) -8/20/20RS-  []Self-Management - 3 month follow -  AADE7 Self care behaviors work sheets, 2020 [employer],  Online resource list - March 2020      []Self-Management  Gestational - RN class -Resource materials sent out : care booklet - \" Gestational Diabetes Mellitus ( GDM) toolkit form [place] gestational diabetes postpartum care learning collaborative 2018. \"Simple Guidelines for meal planning with gestational diabetes. SMBG sheets to fax back to M weekly. BD  healthy injection site selection and rotation with 6 mm insulin syringe and 4 mm pen needle.  Gestational diabetes handout from [address] [place] [address]  for dates and locations      []ADA  Where do I Begin, Living with Type 2 diabetes ADA home support program    Web site: diabetes. org/living    Call: 1800 DIABETES  e-mail: [email or web address] org     []  Internet web sites - ADAWeb site: www.diabetes. org       [] [facility] opens at 8 30 am daily for walkers, shops open at 10 am   [address] [facility], [address] [facility]   [phone] Daily - 8:30am - 10am    3rd Tuesday of every month [address] [facility] expert speakers visit from 9 - 10am        [] [facility], [facility], [facility] [place], [place], [facility] anderson (site rotate)  Call [phone] or visit   website: [email or web address] for more details   Check web site for updated times/ dates       [] [address]  [address] [facility], [address] [place] [address] South Tuesday and Thursday -   9 :30 am- 10:30am - ongoing   [] [address]  [address] [place] [address], [address] [address] [facility] Thursday 11:00am - 11:45am     [] Third Wednesday Cooking  Class  Free  Registration is required     [address] [facility]. [facility], [address] [facility] [phone] or   Email [email or web address]   Wednesdays-5:00- 6:00 pm       [] Eat Smart  Be Active & Learn How - Free   Families & grandparents with children in home 0- 25 & pregnant moms          Various sites in community - call to find next session near you. [facility] extension office for future sessions - Marly Magaña  Email : [email or web address]  Phone: [phone]     [] (SNAP-Ed)   - free nutrition education   - adults and youth, who are eligible for the Supplemental Nutrition Assistance Program    Various sites in community - call to find next session near you.  Cecy Monroy [employer]  contact [employer], Email:[email or web address]           Post Education Referrals:      [] [place] Tobacco Quit information sheet and [address] [facility] , 21       [] Dental care - [facility] of [facility]     [] [facility] ([facility]) link  phone number - for information and referral to [facility]  Clinically  [address] [facility], WEIGHT MANAGEMENT        []Other  Isacc Adhikari RN

## 2020-09-22 ENCOUNTER — OFFICE VISIT (OUTPATIENT)
Dept: FAMILY MEDICINE CLINIC | Age: 70
End: 2020-09-22
Payer: COMMERCIAL

## 2020-09-22 VITALS
OXYGEN SATURATION: 98 % | HEART RATE: 59 BPM | BODY MASS INDEX: 45.83 KG/M2 | HEIGHT: 69 IN | SYSTOLIC BLOOD PRESSURE: 122 MMHG | WEIGHT: 309.4 LBS | TEMPERATURE: 97.9 F | DIASTOLIC BLOOD PRESSURE: 70 MMHG

## 2020-09-22 PROBLEM — E66.01 MORBIDLY OBESE (HCC): Status: ACTIVE | Noted: 2020-09-22

## 2020-09-22 LAB
CREATININE URINE POCT: NORMAL
HBA1C MFR BLD: 6.6 %
MICROALBUMIN/CREAT 24H UR: NORMAL MG/G{CREAT}
MICROALBUMIN/CREAT UR-RTO: NORMAL

## 2020-09-22 PROCEDURE — G0444 DEPRESSION SCREEN ANNUAL: HCPCS | Performed by: INTERNAL MEDICINE

## 2020-09-22 PROCEDURE — G0008 ADMIN INFLUENZA VIRUS VAC: HCPCS | Performed by: INTERNAL MEDICINE

## 2020-09-22 PROCEDURE — 82044 UR ALBUMIN SEMIQUANTITATIVE: CPT | Performed by: INTERNAL MEDICINE

## 2020-09-22 PROCEDURE — 99214 OFFICE O/P EST MOD 30 MIN: CPT | Performed by: INTERNAL MEDICINE

## 2020-09-22 PROCEDURE — 83036 HEMOGLOBIN GLYCOSYLATED A1C: CPT | Performed by: INTERNAL MEDICINE

## 2020-09-22 PROCEDURE — 90694 VACC AIIV4 NO PRSRV 0.5ML IM: CPT | Performed by: INTERNAL MEDICINE

## 2020-09-22 PROCEDURE — 90471 IMMUNIZATION ADMIN: CPT | Performed by: INTERNAL MEDICINE

## 2020-09-22 RX ORDER — DULOXETIN HYDROCHLORIDE 60 MG/1
60 CAPSULE, DELAYED RELEASE ORAL DAILY
Qty: 30 CAPSULE | Refills: 3 | Status: SHIPPED | OUTPATIENT
Start: 2020-09-22 | End: 2020-12-22 | Stop reason: SDUPTHER

## 2020-09-22 RX ORDER — TICAGRELOR 60 MG/1
1 TABLET ORAL DAILY
COMMUNITY
Start: 2020-07-19

## 2020-09-22 RX ORDER — KETOCONAZOLE 20 MG/G
CREAM TOPICAL
Qty: 30 G | Refills: 1 | Status: SHIPPED | OUTPATIENT
Start: 2020-09-22 | End: 2022-05-18

## 2020-09-22 RX ORDER — BUSPIRONE HYDROCHLORIDE 10 MG/1
10 TABLET ORAL 2 TIMES DAILY
COMMUNITY
End: 2021-02-19

## 2020-09-22 ASSESSMENT — ENCOUNTER SYMPTOMS
NAUSEA: 0
CHOKING: 0
WHEEZING: 0
COUGH: 0
ABDOMINAL PAIN: 0
BLOOD IN STOOL: 0
DIARRHEA: 0
VOMITING: 0
ANAL BLEEDING: 0
CHEST TIGHTNESS: 0
CONSTIPATION: 0
SHORTNESS OF BREATH: 0

## 2020-09-22 ASSESSMENT — PATIENT HEALTH QUESTIONNAIRE - PHQ9
4. FEELING TIRED OR HAVING LITTLE ENERGY: 2
2. FEELING DOWN, DEPRESSED OR HOPELESS: 2
SUM OF ALL RESPONSES TO PHQ QUESTIONS 1-9: 9
SUM OF ALL RESPONSES TO PHQ9 QUESTIONS 1 & 2: 4
8. MOVING OR SPEAKING SO SLOWLY THAT OTHER PEOPLE COULD HAVE NOTICED. OR THE OPPOSITE, BEING SO FIGETY OR RESTLESS THAT YOU HAVE BEEN MOVING AROUND A LOT MORE THAN USUAL: 1
5. POOR APPETITE OR OVEREATING: 1
9. THOUGHTS THAT YOU WOULD BE BETTER OFF DEAD, OR OF HURTING YOURSELF: 0
6. FEELING BAD ABOUT YOURSELF - OR THAT YOU ARE A FAILURE OR HAVE LET YOURSELF OR YOUR FAMILY DOWN: 0
1. LITTLE INTEREST OR PLEASURE IN DOING THINGS: 2
3. TROUBLE FALLING OR STAYING ASLEEP: 0
SUM OF ALL RESPONSES TO PHQ QUESTIONS 1-9: 9
10. IF YOU CHECKED OFF ANY PROBLEMS, HOW DIFFICULT HAVE THESE PROBLEMS MADE IT FOR YOU TO DO YOUR WORK, TAKE CARE OF THINGS AT HOME, OR GET ALONG WITH OTHER PEOPLE: 1
7. TROUBLE CONCENTRATING ON THINGS, SUCH AS READING THE NEWSPAPER OR WATCHING TELEVISION: 1

## 2020-09-22 ASSESSMENT — VISUAL ACUITY: OU: 1

## 2020-09-22 NOTE — PROGRESS NOTES
Subjective:       Patient ID:     Atif Daniel is a 79 y.o. male who presents for No chief complaint on file. HPI:  Nursing note reviewed and discussed with patient. Here for follow-up   He has been attending the diabetic education classes, and learning to make different choices as far as his eating habits. He denies hypoglycemia, hyperglycemia, polyuria, polydipsia, fatigue, numbness tingling in legs or feet. Due for foot exam today. Due for an eye exam as well. Had a heart cath at [facility] clinic since [place] following a positive stress test with moderate anterior ischemia indicated due to episode of SVT - states 30% blockage. Remains on brilinta. He has no new meds. Depression and anxiety worse over last few months. Worries a lot about everything. He states just about everything is making him anxious nowadays. He is worried about his investments, his properties, getting his boat winterized, etc. he is taking Celexa 40 mg daily without any change. He would like to try something in addition to the Celexa. Denies suicidal or homicidal ideation, difficulty sleeping, auditory or visual hallucinations. He does have sleep apnea but other than that denies sleep disturbance. He is on BiPAP for his sleep apnea, using 6 to 8 hours per night. Working on getting into rheumatology for ankylosing spondylitis which is causing him chronic back pain. Has multiple questions about what the progression of his disease would look like. Patient's medications, allergies, past medical, surgical, social and family histories were reviewed and updated as appropriate.     Past Medical History:   Diagnosis Date    Ankylosing spondylitis ([address]     Arthritis     Asthma not dependent on systemic steroids without complication 94/36/2540    CAD (coronary artery disease)     Hypertension     DEB treated with BiPAP 12/07/2017    Pulmonary hypertension ([facility] 99/38/5989    Silicosis ([facility] 57/2639    STEMI (ST elevation myocardial infarction) ([facility] 2017     Past Surgical History:   Procedure Laterality Date    CORONARY ANGIOPLASTY WITH STENT PLACEMENT  2017    JOINT REPLACEMENT      right hip       Social History     Tobacco Use    Smoking status: Former Smoker     Packs/day: 0.25     Years: 1.00     Pack years: 0.25     Types: Cigarettes     Last attempt to quit: 1975     Years since quittin.8    Smokeless tobacco: Never Used   Substance Use Topics    Alcohol use: Yes     Comment: meryl social drinker      Patient Active Problem List   Diagnosis    Ankylosing spondylitis of multiple sites in spine ([facility]    Chronic silicosis ([facility]    Moderate persistent asthma without complication    Coronary artery disease involving native coronary artery of native heart without angina pectoris    History of ST elevation myocardial infarction (STEMI)         Prior to Visit Medications    Medication Sig Taking? Authorizing Provider   busPIRone (BUSPAR) 10 MG tablet Take 10 mg by mouth 2 times daily Yes Cezar Carlson, DO   Loratadine (CLARITIN PO) Take 10 mg by mouth daily Yes Historical Provider, MD   Blood Glucose Monitoring Suppl (BLOOD GLUCOSE SYSTEM MICHAELA) KIT Test once a day Yes Justin Fregoso MD   blood glucose monitor strips Test 1 time a day & as needed for symptoms of irregular blood glucose. Yes Justin Fregoso MD   Lancets MISC 1 each by Does not apply route 2 times daily Yes Justin Fregoso MD   metFORMIN (GLUCOPHAGE) 500 MG tablet Take 1 tablet by mouth 2 times daily (with meals) Start with one tablet once daily for two weeks then increase to twice daily thereafter.  Yes Carole Swenson MD   meloxicam (MOBIC) 15 MG tablet Take 1 tablet by mouth daily Yes Carole Swenson MD   metoprolol succinate (TOPROL XL) 50 MG extended release tablet Take 1 tablet by mouth daily Yes Justin Fregoso MD   citalopram (CELEXA) 40 MG tablet Take 1 tablet by mouth daily Yes Justin Fregoso MD amLODIPine (NORVASC) 2.5 MG tablet TAKE 1 Tablet  BY MOUTH EVERY DAY Yes Chris Blandon MD   lisinopril (PRINIVIL;ZESTRIL) 10 MG tablet Take 1 tablet by mouth daily Yes Haily Dubose MD   atorvastatin (LIPITOR) 80 MG tablet Take 1 tablet by mouth nightly Yes Haily Dubose MD   aspirin 81 MG chewable tablet Take 1 tablet by mouth daily Yes Haily Dubose MD   montelukast (SINGULAIR) 10 MG tablet Take 10 mg by mouth nightly Yes Historical Provider, MD   albuterol (PROVENTIL) (2.5 MG/3ML) 0.083% nebulizer solution Take 2.5 mg by nebulization 4 times daily Yes Historical Provider, MD   Fluticasone Furoate-Vilanterol (BREO ELLIPTA) 200-25 MCG/INH AEPB Inhale 200 mcg into the lungs daily Yes Historical Provider, MD   BRILINTA 60 MG TABS tablet Take 1 tablet by mouth daily  Guero Sue, APRN - CNP   nitroGLYCERIN (NITROSTAT) 0.4 MG SL tablet Place 0.4 mg under the tongue every 5 minutes as needed for Chest pain up to max of 3 total doses. If no relief after 1 dose, call 911. Historical Provider, MD     Review of Systems  Review of Systems   Constitutional: Negative for fatigue, fever and unexpected weight change. Respiratory: Negative for cough, choking, chest tightness, shortness of breath and wheezing. Cardiovascular: Negative for chest pain, palpitations and leg swelling. Gastrointestinal: Negative for abdominal pain, anal bleeding, blood in stool, constipation, diarrhea, nausea and vomiting. Endocrine: Negative. Musculoskeletal: Negative for joint swelling and myalgias. Skin: Negative. Neurological: Negative for dizziness. Psychiatric/Behavioral: Negative for sleep disturbance. All other systems reviewed and are negative.          Objective:       Physical Exam:  /70 (Site: Left Upper Arm, Position: Sitting, Cuff Size: Large Adult)   Pulse 59   Temp 97.9 °F (36.6 °C) (Temporal)   Ht 5' 8.9\" (1.75 m)   Wt (!) 309 lb 6.4 oz (140.3 kg)   SpO2 98%   BMI 45.82 kg/m²    Wt Readings from Last 3 Encounters:   09/22/20 (!) 309 lb 6.4 oz (140.3 kg)   01/30/20 (!) 319 lb (144.7 kg)   04/30/18 (!) 309 lb (140.2 kg)       Physical Exam  Vitals signs and nursing note reviewed. Constitutional:       General: He is not in acute distress. Appearance: He is well-developed. He is not ill-appearing. Eyes:      General: Lids are normal. Vision grossly intact. Cardiovascular:      Rate and Rhythm: Normal rate and regular rhythm. Heart sounds: Normal heart sounds, S1 normal and S2 normal. No murmur. No friction rub. No gallop. Pulmonary:      Effort: Pulmonary effort is normal. No respiratory distress. Breath sounds: Normal breath sounds. No wheezing. Abdominal:      General: Bowel sounds are normal.      Palpations: Abdomen is soft. There is no mass. Tenderness: There is no abdominal tenderness. There is no guarding. Musculoskeletal: Normal range of motion. Skin:     General: Skin is warm and dry. Capillary Refill: Capillary refill takes less than 2 seconds. Neurological:      General: No focal deficit present. Mental Status: He is alert and oriented to person, place, and time. Psychiatric:         Attention and Perception: Attention and perception normal.         Mood and Affect: Mood is anxious. Speech: Speech normal.         Behavior: Behavior normal. Behavior is cooperative. Thought Content:  Thought content normal.         Cognition and Memory: Cognition and memory normal.         Judgment: Judgment normal.     Diabetic Foot Exam    Pulses:  normal,   Edema: 1+  Skin: abnormal - onychomycosis and tinea pedis noted     Sensory exam  Monofilament Sensation:  normal  (minimum of 5 random plantar locations tested, avoid callous areas - > 1 area with absence of sensation is + for neuropathy)      Plus one of the following  Pinprick:  Intact  Proprioception:  Intact  Vibration (128 Hz):  N/A          Data Review  Hospital Outpatient Visit on 06/22/2020   Component Date Value    Hemoglobin A1C 06/22/2020 9.9*    Estimated Avg Glucose 06/22/2020 [address] Outpatient Visit on 06/22/2020   Component Date Value    WBC 06/22/2020 NOT REPORTED     Eosinophils % 06/22/2020 NOT REPORTED     Eosinophils Absolute 06/22/2020 252     IgE 06/22/2020 1460*   Hospital Outpatient Visit on 06/16/2020   Component Date Value    Left Ventricular Ejectio* 06/16/2020 45     LVEF MODALITY 06/16/2020 CATH     Platelets 83/39/3997 201     POC Hemoglobin 06/16/2020 15.1     POC Hematocrit 06/16/2020 44     POC Creatinine 06/16/2020 0.75     GFR Comment 06/16/2020 >60     GFR Non- 06/16/2020 >60     GFR Comment 06/16/2020          POC Sodium 06/16/2020 135*    POC Potassium 06/16/2020 4.0     POC Ionized Calcium 06/16/2020 1.21     POC Glucose 06/16/2020 [address] Outpatient Visit on 06/12/2020   Component Date Value    SARS-CoV-2 06/12/2020          SARS-CoV-2, Rapid 06/12/2020          Source 06/12/2020 . NASOPHARYNGEAL SWAB     SARS-CoV-2, PCR 06/12/2020 Not Detected           Assessment/Plan:      1. Need for influenza vaccination  - INFLUENZA, QUADV, ADJUVANTED, 65 YRS =, IM, PF, PREFILL SYR, 0.5ML (FLUAD)    2. Type 2 diabetes mellitus without complication, without long-term current use of insulin ([facility])  - POCT glycosylated hemoglobin (Hb A1C)  -  DIABETES FOOT EXAM  - External Referral To Ophthalmology  - POCT microalbumin    3. Morbidly obese ([address]  Reviewed healthy diet and lifestyle  Goal to exercise 150 min/week per [facility] recs   Ongoing dietary counseling re: low salt diet, five fruit/vegetables daily, healthy fats  Avoid processed and sugary foods, large amounts of alcoholic drinks  Healthy sleep habits reviewed - consistent bedtime, consistent wake-up time, quiet dark sleep environment, goal 6-8 hours each night of uninterrupted sleep, no exercising within two hours of bedtime       4.  Moderately severe recurrent major depression ([facility]  - DULoxetine (CYMBALTA) 60 MG extended release capsule; Take 1 capsule by mouth daily  Dispense: 30 capsule; Refill: 3    5. Mixed hyperlipidemia  - Lipid, Fasting; Future    6. Onychomycosis  - ketoconazole (NIZORAL) 2 % cream; Apply topically daily.   Dispense: 30 g; Refill: 1          Health Maintenance Due   Topic Date Due    AAA screen  1950    Hepatitis C screen  1950    Diabetic foot exam  06/09/1960    Diabetic retinal exam  06/09/1960    Diabetic microalbuminuria test  06/09/1968    DTaP/Tdap/Td vaccine (1 - Tdap) 06/09/1969    Shingles Vaccine (1 of 2) 06/09/2000    Colon cancer screen colonoscopy  06/09/2000    Pneumococcal 65+ years Vaccine (1 of 1 - PPSV23) 06/09/2015    Lipid screen  12/10/2018    Annual Wellness Visit (AWV)  08/02/2020    Flu vaccine (1) 09/01/2020    A1C test (Diabetic or Prediabetic)  09/22/2020       Electronically signed by Talat eBrnal MD on 9/22/2020 at 1:40 PM

## 2020-09-22 NOTE — PROGRESS NOTES
Pt is here today for a follow up for depression and anxiety. Vaccine Information Sheet, \"Influenza - Inactivated\"  given to Larry Abdi, or parent/legal guardian of  Larry Abdi and verbalized understanding. Patient responses:    Have you ever had a reaction to a flu vaccine? No  Do you have any current illness? No  Have you ever had Guillian [place] Syndrome? No  Do you have a serious allergy to any of the following: Neomycin, Polymyxin, Thimerosal, eggs or egg products? No    Flu vaccine given per order. Please see immunization tab. Risks and benefits explained. Current VIS given.

## 2020-12-16 ENCOUNTER — HOSPITAL ENCOUNTER (OUTPATIENT)
Age: 70
Setting detail: SPECIMEN
Discharge: HOME | End: 2020-12-16
Payer: COMMERCIAL

## 2020-12-16 LAB
CHOLESTEROL, FASTING: 98 MG/DL
CHOLESTEROL/HDL RATIO: 2.2
HDLC SERPL-MCNC: 45 MG/DL
LDL CHOLESTEROL: 39 MG/DL (ref 0–130)
TRIGLYCERIDE, FASTING: 70 MG/DL
VLDLC SERPL CALC-MCNC: NORMAL MG/DL (ref 1–30)

## 2020-12-21 RX ORDER — MELOXICAM 15 MG/1
TABLET ORAL
Qty: 90 TABLET | Refills: 1 | Status: SHIPPED | OUTPATIENT
Start: 2020-12-21 | End: 2021-05-10 | Stop reason: SDUPTHER

## 2020-12-21 NOTE — TELEPHONE ENCOUNTER
Last visit: 9/22/20  Last Med refill: 6/1/20  Does patient have enough medication for 72 hours: Yes    Next Visit Date:  Future Appointments   Date Time Provider Apolinar Cabrales   12/22/2020  1:15 PM Carole Siegel  [address] Maintenance   Topic Date Due    AAA screen  1950    Hepatitis C screen  1950    DTaP/Tdap/Td vaccine (1 - Tdap) 06/09/1969    Shingles Vaccine (1 of 2) 06/09/2000    Colon cancer screen colonoscopy  06/09/2000    Pneumococcal 65+ years Vaccine (1 of 1 - PPSV23) 06/09/2015    Annual Wellness Visit (AWV)  08/02/2020    Diabetic retinal exam  01/29/2021    Potassium monitoring  06/16/2021    Creatinine monitoring  06/16/2021    Diabetic foot exam  09/22/2021    A1C test (Diabetic or Prediabetic)  09/22/2021    Diabetic microalbuminuria test  09/22/2021    Lipid screen  12/16/2021    Flu vaccine  Completed    Hepatitis A vaccine  Aged Out    Hib vaccine  Aged Out    Meningococcal (ACWY) vaccine  Aged Out       Hemoglobin A1C (%)   Date Value   09/22/2020 6.6   06/22/2020 9.9 (H)   12/09/2017 6.6 (H)             ( goal A1C is < 7)   No results found for: LABMICR  LDL Cholesterol (mg/dL)   Date Value   12/16/2020 39   12/10/2017 99       (goal LDL is <100)   AST (U/L)   Date Value   01/30/2020 14     ALT (U/L)   Date Value   01/30/2020 24     BUN (mg/dL)   Date Value   01/30/2020 17     BP Readings from Last 3 Encounters:   09/22/20 122/70   01/30/20 121/60   12/11/17 124/64          (goal 120/80)    All Future Testing planned in [facility]  Lab Frequency Next Occurrence               Patient Active Problem List:     Ankylosing spondylitis of multiple sites in spine (HCC)     Chronic silicosis ([facility]     Moderate persistent asthma without complication     Coronary artery disease involving native coronary artery of native heart without angina pectoris     History of ST elevation myocardial infarction (STEMI)     Morbidly obese ([facility]

## 2020-12-22 ENCOUNTER — OFFICE VISIT (OUTPATIENT)
Dept: FAMILY MEDICINE CLINIC | Age: 70
End: 2020-12-22
Payer: COMMERCIAL

## 2020-12-22 VITALS
OXYGEN SATURATION: 99 % | SYSTOLIC BLOOD PRESSURE: 122 MMHG | BODY MASS INDEX: 45.62 KG/M2 | TEMPERATURE: 98.2 F | WEIGHT: 308 LBS | HEART RATE: 64 BPM | DIASTOLIC BLOOD PRESSURE: 70 MMHG

## 2020-12-22 LAB — HBA1C MFR BLD: 7.3 %

## 2020-12-22 PROCEDURE — 3051F HG A1C>EQUAL 7.0%<8.0%: CPT | Performed by: INTERNAL MEDICINE

## 2020-12-22 PROCEDURE — G0009 ADMIN PNEUMOCOCCAL VACCINE: HCPCS | Performed by: INTERNAL MEDICINE

## 2020-12-22 PROCEDURE — 83036 HEMOGLOBIN GLYCOSYLATED A1C: CPT | Performed by: INTERNAL MEDICINE

## 2020-12-22 PROCEDURE — 90732 PPSV23 VACC 2 YRS+ SUBQ/IM: CPT | Performed by: INTERNAL MEDICINE

## 2020-12-22 PROCEDURE — 99214 OFFICE O/P EST MOD 30 MIN: CPT | Performed by: INTERNAL MEDICINE

## 2020-12-22 RX ORDER — METOPROLOL SUCCINATE 50 MG/1
50 TABLET, EXTENDED RELEASE ORAL DAILY
Qty: 90 TABLET | Refills: 1 | Status: SHIPPED | OUTPATIENT
Start: 2020-12-22 | End: 2021-05-10 | Stop reason: SDUPTHER

## 2020-12-22 RX ORDER — DULOXETIN HYDROCHLORIDE 60 MG/1
60 CAPSULE, DELAYED RELEASE ORAL DAILY
Qty: 90 CAPSULE | Refills: 1 | Status: SHIPPED | OUTPATIENT
Start: 2020-12-22 | End: 2021-05-10 | Stop reason: SDUPTHER

## 2020-12-22 SDOH — ECONOMIC STABILITY: FOOD INSECURITY: WITHIN THE PAST 12 MONTHS, THE FOOD YOU BOUGHT JUST DIDN'T LAST AND YOU DIDN'T HAVE MONEY TO GET MORE.: NEVER TRUE

## 2020-12-22 SDOH — ECONOMIC STABILITY: FOOD INSECURITY: WITHIN THE PAST 12 MONTHS, YOU WORRIED THAT YOUR FOOD WOULD RUN OUT BEFORE YOU GOT MONEY TO BUY MORE.: NEVER TRUE

## 2020-12-22 SDOH — ECONOMIC STABILITY: TRANSPORTATION INSECURITY
IN THE PAST 12 MONTHS, HAS LACK OF TRANSPORTATION KEPT YOU FROM MEETINGS, WORK, OR FROM GETTING THINGS NEEDED FOR DAILY LIVING?: NO

## 2020-12-22 SDOH — ECONOMIC STABILITY: INCOME INSECURITY: HOW HARD IS IT FOR YOU TO PAY FOR THE VERY BASICS LIKE FOOD, HOUSING, MEDICAL CARE, AND HEATING?: NOT VERY HARD

## 2020-12-22 SDOH — ECONOMIC STABILITY: TRANSPORTATION INSECURITY
IN THE PAST 12 MONTHS, HAS THE LACK OF TRANSPORTATION KEPT YOU FROM MEDICAL APPOINTMENTS OR FROM GETTING MEDICATIONS?: NO

## 2020-12-22 ASSESSMENT — VISUAL ACUITY: OU: 1

## 2020-12-22 NOTE — PROGRESS NOTES
Pt is here today for a follow up.   No complaints or issues at this time    Financial screening done

## 2020-12-22 NOTE — PROGRESS NOTES
Subjective:       Patient ID:     Era Huffman is a 79 y.o. male who presents for   Chief Complaint   Patient presents with    Follow-up       HPI:  Nursing note reviewed and discussed with patient. For follow-up today. Depression-doing much better on Cymbalta 60 mg daily. Denies thoughts of death, sleep difficulty, anhedonia, suicidal or homicidal ideation, auditory or visual hallucination. Anxiety-well-controlled with Cymbalta and BuSpar twice daily. Type 2 diabetes-doing well with Metformin. Continuing to follow a low-carb diet and avoiding processed sugars as much as possible. Denies hypoglycemia, polyuria, polydipsia, vision changes, paresthesias, fatigue. Hypertension-doing well on current regimen. On lisinopril and amlodipine. Denies chest pain, palpitations, dizziness, orthopnea, paroxysmal nocturnal dyspnea, dyspnea on exertion. Hyperlipidemia-tolerating Lipitor without myalgias, dyspepsia, jaundice. Joint pains-well-controlled with Mobic, would like a refill. Following with cardiology. Remains on Brilinta. Patient's medications, allergies, past medical, surgical, social and family histories were reviewed and updated as appropriate.     Past Medical History:   Diagnosis Date    Ankylosing spondylitis ([facility]     Arthritis     Asthma not dependent on systemic steroids without complication     CAD (coronary artery disease)     Hypertension     DEB treated with BiPAP 2017    Pulmonary hypertension ([facility]     Silicosis ([facility]     STEMI (ST elevation myocardial infarction) ([facility] 2017     Past Surgical History:   Procedure Laterality Date    CORONARY ANGIOPLASTY WITH STENT PLACEMENT  2017    JOINT REPLACEMENT      right hip       Social History     Tobacco Use    Smoking status: Former Smoker     Packs/day: 0.25     Years: 1.00     Pack years: 0.25     Types: Cigarettes     Quit date: 1975     Years since quittin.0  Smokeless tobacco: Never Used   Substance Use Topics    Alcohol use: Yes     Comment: meryl social drinker      Patient Active Problem List   Diagnosis    Ankylosing spondylitis of multiple sites in spine ([facility]    Chronic silicosis ([facility]    Moderate persistent asthma without complication    Coronary artery disease involving native coronary artery of native heart without angina pectoris    History of ST elevation myocardial infarction (STEMI)    Morbidly obese ([facility]         Prior to Visit Medications    Medication Sig Taking? Authorizing Provider   meloxicam (MOBIC) 15 MG tablet TAKE ONE TABLET BY MOUTH DAILY Yes Carole Jiménez MD   BRILINTA 60 MG TABS tablet Take 1 tablet by mouth daily Yes JULIA Tate - CNP   busPIRone (BUSPAR) 10 MG tablet Take 10 mg by mouth 2 times daily Yes Cezar Carlson,    DULoxetine (CYMBALTA) 60 MG extended release capsule Take 1 capsule by mouth daily Yes Fern Sarmiento MD   ketoconazole (NIZORAL) 2 % cream Apply topically daily. Yes Fern Sarmiento MD   Loratadine (CLARITIN PO) Take 10 mg by mouth daily Yes Ana Rausch MD   Blood Glucose Monitoring Suppl (BLOOD GLUCOSE SYSTEM MICHAELA) KIT Test once a day Yes Fern Sarmiento MD   blood glucose monitor strips Test 1 time a day & as needed for symptoms of irregular blood glucose. Yes Fern Sarmiento MD   Lancets MISC 1 each by Does not apply route 2 times daily Yes Fern Sarmiento MD   metFORMIN (GLUCOPHAGE) 500 MG tablet Take 1 tablet by mouth 2 times daily (with meals) Start with one tablet once daily for two weeks then increase to twice daily thereafter.  Yes Fern Sarmiento MD   metoprolol succinate (TOPROL XL) 50 MG extended release tablet Take 1 tablet by mouth daily Yes Fern Sarmiento MD   amLODIPine (NORVASC) 2.5 MG tablet TAKE 1 Tablet  BY MOUTH EVERY DAY Yes Rahel Clemens MD nitroGLYCERIN (NITROSTAT) 0.4 MG SL tablet Place 0.4 mg under the tongue every 5 minutes as needed for Chest pain up to max of 3 total doses. If no relief after 1 dose, call 911. Yes Historical Provider, MD   lisinopril (PRINIVIL;ZESTRIL) 10 MG tablet Take 1 tablet by mouth daily Yes Capo Portillo MD   atorvastatin (LIPITOR) 80 MG tablet Take 1 tablet by mouth nightly Yes Capo Portillo MD   aspirin 81 MG chewable tablet Take 1 tablet by mouth daily Yes Capo Portillo MD   montelukast (SINGULAIR) 10 MG tablet Take 10 mg by mouth nightly Yes Historical Provider, MD   albuterol (PROVENTIL) (2.5 MG/3ML) 0.083% nebulizer solution Take 2.5 mg by nebulization 4 times daily Yes Historical Provider, MD   Fluticasone Furoate-Vilanterol (BREO ELLIPTA) 200-25 MCG/INH AEPB Inhale 200 mcg into the lungs daily Yes Historical Provider, MD     Review of Systems  Review of Systems   Constitutional: Negative for fatigue, fever and unexpected weight change. Respiratory: Negative for cough, choking, chest tightness, shortness of breath and wheezing. Cardiovascular: Negative for chest pain, palpitations and leg swelling. Gastrointestinal: Negative for abdominal pain, anal bleeding, blood in stool, constipation, diarrhea, nausea and vomiting. Endocrine: Negative. Musculoskeletal: Negative for joint swelling and myalgias. Skin: Negative. Neurological: Negative for dizziness. Psychiatric/Behavioral: Negative for sleep disturbance. All other systems reviewed and are negative. Objective:       Physical Exam:  /70 (Site: Left Upper Arm, Position: Sitting, Cuff Size: Large Adult)   Pulse 64   Temp 98.2 °F (36.8 °C) (Temporal)   Wt (!) 308 lb (139.7 kg)   SpO2 99%   BMI 45.62 kg/m²   Physical Exam  Vitals signs and nursing note reviewed. Constitutional:       General: He is not in acute distress. Appearance: He is well-developed. He is not ill-appearing.    Eyes:  Hepatitis C screen  1950    DTaP/Tdap/Td vaccine (1 - Tdap) 06/09/1969    Shingles Vaccine (1 of 2) 06/09/2000    Colon cancer screen colonoscopy  06/09/2000    Pneumococcal 65+ years Vaccine (1 of 1 - PPSV23) 06/09/2015    Annual Wellness Visit (AWV)  08/02/2020       Electronically signed by Karolyn Zazueta MD on 12/22/2020 at 1:34 PM

## 2020-12-27 ASSESSMENT — ENCOUNTER SYMPTOMS
CONSTIPATION: 0
ANAL BLEEDING: 0
CHEST TIGHTNESS: 0
BLOOD IN STOOL: 0
CHOKING: 0
COUGH: 0
SHORTNESS OF BREATH: 0
ABDOMINAL PAIN: 0
VOMITING: 0
NAUSEA: 0
DIARRHEA: 0
WHEEZING: 0

## 2021-02-10 ENCOUNTER — HOSPITAL ENCOUNTER (OUTPATIENT)
Dept: CT IMAGING | Age: 71
Discharge: HOME | End: 2021-02-12
Payer: COMMERCIAL

## 2021-02-10 DIAGNOSIS — J62.8 SILICOSIS (HCC): ICD-10-CM

## 2021-02-10 PROCEDURE — 71250 CT THORAX DX C-: CPT

## 2021-02-16 DIAGNOSIS — F41.1 GAD (GENERALIZED ANXIETY DISORDER): ICD-10-CM

## 2021-02-17 RX ORDER — CITALOPRAM 40 MG/1
TABLET ORAL
Qty: 90 TABLET | Refills: 0 | OUTPATIENT
Start: 2021-02-17

## 2021-02-17 NOTE — TELEPHONE ENCOUNTER
Last visit: 12/22/2020  Last Med refill: previous physician  Does patient have enough medication for 72 hours: No:    Next Visit Date:  Future Appointments   Date Time Provider Apolinar Cabrales   4/26/2021  1:00 PM Carole Milligan  [address] Maintenance   Topic Date Due    AAA screen  1950    Hepatitis C screen  1950    DTaP/Tdap/Td vaccine (1 - Tdap) 06/09/1969    Shingles Vaccine (1 of 2) 06/09/2000    Colon cancer screen colonoscopy  06/09/2000    Annual Wellness Visit (AWV)  08/02/2020    Diabetic retinal exam  01/29/2021    Potassium monitoring  06/16/2021    Creatinine monitoring  06/16/2021    Diabetic foot exam  09/22/2021    Diabetic microalbuminuria test  09/22/2021    Lipid screen  12/16/2021    A1C test (Diabetic or Prediabetic)  12/22/2021    Flu vaccine  Completed    Pneumococcal 65+ years Vaccine  Completed    COVID-19 Vaccine  Completed    Hepatitis A vaccine  Aged Out    Hib vaccine  Aged Out    Meningococcal (ACWY) vaccine  Aged Out       Hemoglobin A1C (%)   Date Value   12/22/2020 7.3   09/22/2020 6.6   06/22/2020 9.9 (H)             ( goal A1C is < 7)   No results found for: LABMICR  LDL Cholesterol (mg/dL)   Date Value   12/16/2020 39   12/10/2017 99       (goal LDL is <100)   AST (U/L)   Date Value   01/30/2020 14     ALT (U/L)   Date Value   01/30/2020 24     BUN (mg/dL)   Date Value   01/30/2020 17     BP Readings from Last 3 Encounters:   12/22/20 122/70   09/22/20 122/70   01/30/20 121/60          (goal 120/80)    All Future Testing planned in [facility]  Lab Frequency Next Occurrence   Cologuard (For External Results Only) Once 03/11/2021   US SCREENING FOR AAA Once 02/07/2021               Patient Active Problem List:     Ankylosing spondylitis of multiple sites in spine ([facility]     Chronic silicosis ([facility]     Moderate persistent asthma without complication     Coronary artery disease involving native coronary artery of native heart without angina pectoris     History of ST elevation myocardial infarction (STEMI)     Morbidly obese ([address]

## 2021-02-19 RX ORDER — BUSPIRONE HYDROCHLORIDE 10 MG/1
TABLET ORAL
Qty: 180 TABLET | Refills: 0 | Status: SHIPPED | OUTPATIENT
Start: 2021-02-19 | End: 2021-05-17

## 2021-03-30 DIAGNOSIS — Z12.11 COLON CANCER SCREENING: ICD-10-CM

## 2021-04-12 ENCOUNTER — HOSPITAL ENCOUNTER (OUTPATIENT)
Dept: VASCULAR LAB | Age: 71
Discharge: HOME | End: 2021-04-12
Payer: COMMERCIAL

## 2021-04-12 DIAGNOSIS — Z13.6 SCREENING FOR AAA (ABDOMINAL AORTIC ANEURYSM): ICD-10-CM

## 2021-04-12 PROCEDURE — 76706 US ABDL AORTA SCREEN AAA: CPT

## 2021-04-26 ENCOUNTER — TELEPHONE (OUTPATIENT)
Dept: FAMILY MEDICINE CLINIC | Age: 71
End: 2021-04-26

## 2021-04-26 NOTE — TELEPHONE ENCOUNTER
Patient called at 1:20pm asking what time his appointment was for today. When I told patient that is appointment was for 1pm today he got anger and said he left 3 voice messages and nobody called him back. He wanted to come right over and I told him we could work him in at 46p today he said it wouldn't work. Asked if he could come in at 65 today and said that wouldn't work. I asked when would be a good time for him to come in and he said 1pm today. Patient went on saying why leave messages if you don't return calls. Informed patient that leaving a voice message isn't an instant call back and we had a large number of calls and messages this morning and we are trying our best to get to all of them. Patient stated oh so you're short staffed I said yes but again we are trying our best. He said it's not my fault your short staffed and I told hm yes sir you are correct it isn't your fault. Patient kept complaining about not getting a return call from his voice message he left. I mentioned to patient that the office did call him to confirm his appointment for today on Friday and the automated voice message also called him. Patient stated he didn't get a message. I verified patient's phone number with him he said that doesn't do any good because he didn't get it and it doesn't matter. I asked the patient what day was good for him to come in and I would put him down for that day. Patient kept saying I was argumentative and that he was the consumer and I shouldn't be argumentative with him. Told patient I wasn't trying to be but if he would just please give me a date I could reschedule him. Patient just kept on complaining that we aren't doing our jobs and that I was argumentative and he was the consumer and shouldn't be treated poorly. I said to the patient yes sir I agree and I am a human being and I don't deserve to be talked to they way you are speaking to me because you are upsetting me.  He stated doesn't matter that someone in our profession should cater to consumers. After asking patient several times what day I could schedule him he finally said May 10th and I scheduled him for that day. He asked me my name so I gave it to him, told him to have a good day and hung up.   Myla Nunes checked voice messages around 12:15pm and there was not messages left by this patient

## 2021-05-10 ENCOUNTER — OFFICE VISIT (OUTPATIENT)
Dept: FAMILY MEDICINE CLINIC | Age: 71
End: 2021-05-10
Payer: COMMERCIAL

## 2021-05-10 VITALS
HEART RATE: 67 BPM | HEIGHT: 69 IN | BODY MASS INDEX: 45.32 KG/M2 | SYSTOLIC BLOOD PRESSURE: 132 MMHG | RESPIRATION RATE: 20 BRPM | DIASTOLIC BLOOD PRESSURE: 74 MMHG | OXYGEN SATURATION: 98 % | WEIGHT: 306 LBS

## 2021-05-10 DIAGNOSIS — E11.9 TYPE 2 DIABETES MELLITUS WITHOUT COMPLICATION, WITHOUT LONG-TERM CURRENT USE OF INSULIN (HCC): Primary | ICD-10-CM

## 2021-05-10 DIAGNOSIS — M45.0 ANKYLOSING SPONDYLITIS OF MULTIPLE SITES IN SPINE (HCC): ICD-10-CM

## 2021-05-10 DIAGNOSIS — I25.10 CORONARY ARTERY DISEASE INVOLVING NATIVE CORONARY ARTERY OF NATIVE HEART WITHOUT ANGINA PECTORIS: ICD-10-CM

## 2021-05-10 DIAGNOSIS — F33.2 MODERATELY SEVERE RECURRENT MAJOR DEPRESSION (HCC): ICD-10-CM

## 2021-05-10 DIAGNOSIS — E11.59 TYPE 2 DIABETES MELLITUS WITH OTHER CIRCULATORY COMPLICATION, WITHOUT LONG-TERM CURRENT USE OF INSULIN (HCC): ICD-10-CM

## 2021-05-10 LAB — HBA1C MFR BLD: 7.6 %

## 2021-05-10 PROCEDURE — 3051F HG A1C>EQUAL 7.0%<8.0%: CPT | Performed by: NURSE PRACTITIONER

## 2021-05-10 PROCEDURE — 83036 HEMOGLOBIN GLYCOSYLATED A1C: CPT | Performed by: NURSE PRACTITIONER

## 2021-05-10 PROCEDURE — 99213 OFFICE O/P EST LOW 20 MIN: CPT | Performed by: NURSE PRACTITIONER

## 2021-05-10 RX ORDER — NITROGLYCERIN 0.4 MG/1
0.4 TABLET SUBLINGUAL EVERY 5 MIN PRN
Qty: 25 TABLET | Refills: 0 | Status: SHIPPED | OUTPATIENT
Start: 2021-05-10 | End: 2021-09-15 | Stop reason: SDUPTHER

## 2021-05-10 RX ORDER — METOPROLOL SUCCINATE 50 MG/1
50 TABLET, EXTENDED RELEASE ORAL DAILY
Qty: 90 TABLET | Refills: 1 | Status: SHIPPED | OUTPATIENT
Start: 2021-05-10 | End: 2021-09-15 | Stop reason: SDUPTHER

## 2021-05-10 RX ORDER — CITALOPRAM 20 MG/1
20 TABLET ORAL DAILY
Qty: 30 TABLET | Refills: 1 | Status: SHIPPED | OUTPATIENT
Start: 2021-05-10 | End: 2021-07-12

## 2021-05-10 RX ORDER — DULOXETIN HYDROCHLORIDE 60 MG/1
60 CAPSULE, DELAYED RELEASE ORAL DAILY
Qty: 90 CAPSULE | Refills: 1 | Status: SHIPPED | OUTPATIENT
Start: 2021-05-10 | End: 2021-09-15 | Stop reason: SDUPTHER

## 2021-05-10 RX ORDER — MELOXICAM 15 MG/1
TABLET ORAL
Qty: 90 TABLET | Refills: 1 | Status: SHIPPED | OUTPATIENT
Start: 2021-05-10 | End: 2021-09-15 | Stop reason: SDUPTHER

## 2021-05-10 ASSESSMENT — PATIENT HEALTH QUESTIONNAIRE - PHQ9
SUM OF ALL RESPONSES TO PHQ9 QUESTIONS 1 & 2: 2
SUM OF ALL RESPONSES TO PHQ QUESTIONS 1-9: 2
SUM OF ALL RESPONSES TO PHQ QUESTIONS 1-9: 2
2. FEELING DOWN, DEPRESSED OR HOPELESS: 2

## 2021-05-10 NOTE — PROGRESS NOTES
[address] [place]  [phone]    Carl Dos Santos is a 79 y.o. male who presents today for his  medical conditions/complaints as noted below. Carl Dos Santos is c/o of Diabetes (morning -150 before breakfast) and Health Maintenance (DM eye[facility])  . HPI:     Presents for DM follow up  A1C from 7.3 to 7.6   Has lost 2lbs since 12/2020 - trying to eat different, cut down on sugars and eating snacks after supper, reports this helps him sleep better   Reports fasting blood sugars before breakfast 140-150s most days     Depression/Anxiety: using cymbalta, feels like it is helping but feels like he still is not quite right - still feels slightly depressed/anxious most days   Would like to get back on celexa at a lower dose, remembers feeling better on this  Using buspirone twice daily, works well  Denies current thoughts of hurting himself or others     HTN: feels like it is well controlled, when he goes to specialist appointments it is 'always in a good range'  Does not regularly check on his own   Has never had to use nitro - just likes to keep it on hand in case he does, requesting refill today   Had recent heart cath due to SVT episode, was told everything was good (has 1 stent in place) told 90% clear  AAA scan was inconclusive - out of pocket cost him $100, has not heard what the follow up would be     Using bipap nightly 8-10 hours nightly, never misses it   Regularly follows with Dr. An Mcnair for pulm - does not have to use rescue inhaler often (maybe once every few weeks)    Twisted right knee a few days ago - had to use cane due to pain and tenderness  Feels like it is doing much better - using tylenol as needed, declines further work up of this   No longer has to use cane to get around     Denies any other problems/concerns at this time       Diabetes  He presents for his follow-up diabetic visit.  He has type 2 diabetes mellitus. His disease course has been stable. Hypoglycemia symptoms include nervousness/anxiousness. Pertinent negatives for hypoglycemia include no confusion, dizziness, headaches, sleepiness, sweats or tremors. Pertinent negatives for diabetes include no blurred vision, no chest pain, no fatigue, no foot ulcerations, no polydipsia, no polyphagia, no polyuria and no weakness. There are no hypoglycemic complications. Symptoms are stable. Diabetic complications include heart disease. Risk factors for coronary artery disease include diabetes mellitus, dyslipidemia, obesity, male sex, hypertension, stress and sedentary lifestyle. He is compliant with treatment all of the time. His weight is stable. Meal planning includes avoidance of concentrated sweets. He has not had a previous visit with a dietitian. He rarely participates in exercise. His breakfast blood glucose is taken between 7-8 am. His breakfast blood glucose range is generally 140-180 mg/dl. An ACE inhibitor/angiotensin II receptor blocker is being taken. He does not see a podiatrist.      Current Outpatient Medications   Medication Sig Dispense Refill    nitroGLYCERIN (NITROSTAT) 0.4 MG SL tablet Place 1 tablet under the tongue every 5 minutes as needed for Chest pain up to max of 3 total doses.  If no relief after 1 dose, call 911. 25 tablet 0    metoprolol succinate (TOPROL XL) 50 MG extended release tablet Take 1 tablet by mouth daily 90 tablet 1    metFORMIN (GLUCOPHAGE) 500 MG tablet Take 1 tablet by mouth 2 times daily (with meals) 180 tablet 1    meloxicam (MOBIC) 15 MG tablet TAKE ONE TABLET BY MOUTH DAILY 90 tablet 1    DULoxetine (CYMBALTA) 60 MG extended release capsule Take 1 capsule by mouth daily 90 capsule 1    citalopram (CELEXA) 20 MG tablet Take 1 tablet by mouth daily 30 tablet 1    busPIRone (BUSPAR) 10 MG tablet TAKE ONE TABLET BY MOUTH TWICE A  tablet 0    BRILINTA 60 MG TABS tablet Take 1 tablet by mouth daily      ketoconazole (NIZORAL) 2 % cream Apply topically daily. 30 g 1    Loratadine (CLARITIN PO) Take 10 mg by mouth daily      Blood Glucose Monitoring Suppl (BLOOD GLUCOSE SYSTEM MICHAELA) KIT Test once a day 1 kit 0    blood glucose monitor strips Test 1 time a day & as needed for symptoms of irregular blood glucose. 100 strip 3    Lancets MISC 1 each by Does not apply route 2 times daily 100 each 5    amLODIPine (NORVASC) 2.5 MG tablet TAKE 1 Tablet  BY MOUTH EVERY DAY 30 tablet 0    lisinopril (PRINIVIL;ZESTRIL) 10 MG tablet Take 1 tablet by mouth daily 30 tablet 3    atorvastatin (LIPITOR) 80 MG tablet Take 1 tablet by mouth nightly 30 tablet 3    aspirin 81 MG chewable tablet Take 1 tablet by mouth daily 30 tablet 3    albuterol (PROVENTIL) (2.5 MG/3ML) 0.083% nebulizer solution Take 2.5 mg by nebulization 4 times daily      Fluticasone Furoate-Vilanterol (BREO ELLIPTA) 200-25 MCG/INH AEPB Inhale 200 mcg into the lungs daily      montelukast (SINGULAIR) 10 MG tablet Take 10 mg by mouth nightly       No current facility-administered medications for this visit. Past Medical History:   Diagnosis Date    Ankylosing spondylitis ([facility]     Arthritis     Asthma not dependent on systemic steroids without complication     CAD (coronary artery disease)     Hypertension     DEB treated with BiPAP 2017    Pulmonary hypertension ([facility]     [facility] 2034    STEMI (ST elevation myocardial infarction) ([facility] 2017      Past Surgical History:   Procedure Laterality Date    CORONARY ANGIOPLASTY WITH STENT PLACEMENT  2017    JOINT REPLACEMENT      right hip     History reviewed. No pertinent family history.   Social History     Tobacco Use    Smoking status: Former Smoker     Packs/day: 0.25     Years: 1.00     Pack years: 0.25     Types: Cigarettes     Quit date: 1975     Years since quittin.4    Smokeless tobacco: Never Used   Substance Use Topics    Alcohol use: Yes     Comment: meryl social drinker      Allergies   Allergen Reactions    Penicillins        Subjective:      Review of Systems   Constitutional: Negative for activity change, chills, fatigue and unexpected weight change. HENT: Negative for congestion, drooling, ear discharge, hearing loss, nosebleeds, postnasal drip, sinus pressure, sinus pain and sore throat. Eyes: Negative for blurred vision, photophobia, pain and visual disturbance. Respiratory: Negative for chest tightness and shortness of breath. Cardiovascular: Negative for chest pain and palpitations. Gastrointestinal: Negative for abdominal pain, constipation, diarrhea, nausea and vomiting. Endocrine: Negative for polydipsia, polyphagia and polyuria. Genitourinary: Negative for dysuria, flank pain, frequency and urgency. Musculoskeletal: Positive for arthralgias and myalgias. Negative for back pain, neck pain and neck stiffness. Skin: Negative for color change and rash. Neurological: Negative for dizziness, tremors, weakness, light-headedness, numbness and headaches. Psychiatric/Behavioral: Negative for confusion, hallucinations, self-injury, sleep disturbance and suicidal ideas. The patient is nervous/anxious. Other pertinent ROS in HPI  Objective:     /74   Pulse 67   Resp 20   Ht 5' 8.9\" (1.75 m)   Wt (!) 306 lb (138.8 kg)   SpO2 98%   BMI 45.32 kg/m²      Physical Exam  Vitals signs reviewed. Constitutional:       Appearance: Normal appearance. He is obese. HENT:      Head: Normocephalic. Right Ear: Tympanic membrane normal.      Left Ear: Tympanic membrane normal.      Nose: Nose normal.      Mouth/Throat:      Mouth: Mucous membranes are moist.      Pharynx: Oropharynx is clear. Eyes:      Extraocular Movements: Extraocular movements intact. Conjunctiva/sclera: Conjunctivae normal.      Pupils: Pupils are equal, round, and reactive to light.    Neck:      Musculoskeletal: Normal range of motion. Cardiovascular:      Rate and Rhythm: Normal rate and regular rhythm. Pulses: Normal pulses. Heart sounds: Normal heart sounds. Pulmonary:      Effort: Pulmonary effort is normal.      Breath sounds: Normal breath sounds. Abdominal:      General: Abdomen is flat. Bowel sounds are normal.      Palpations: Abdomen is soft. Genitourinary:     Rectum: Normal.   Musculoskeletal: Normal range of motion. Skin:     General: Skin is warm and dry. Capillary Refill: Capillary refill takes less than 2 seconds. Neurological:      General: No focal deficit present. Mental Status: He is alert and oriented to person, place, and time. Mental status is at baseline. Psychiatric:         Mood and Affect: Mood is anxious. Mood is not depressed. Affect is not tearful. Speech: Speech normal.         Behavior: Behavior normal.         Thought Content: Thought content normal. Thought content does not include homicidal or suicidal ideation. Thought content does not include homicidal or suicidal plan. Cognition and Memory: Cognition normal.         Judgment: Judgment normal.         Lab Review   No visits with results within 2 Month(s) from this visit. Latest known visit with results is:   Office Visit on 12/22/2020   Component Date Value    Hemoglobin A1C 12/22/2020 7.3      Assessment/PLAN   1. Type 2 diabetes mellitus without complication, without long-term current use of insulin ([facility])  -     POCT glycosylated hemoglobin (Hb A1C)  2. Coronary artery disease involving native coronary artery of native heart without angina pectoris  -     metoprolol succinate (TOPROL XL) 50 MG extended release tablet; Take 1 tablet by mouth daily, Disp-90 tablet, R-1Normal  3. Type 2 diabetes mellitus with other circulatory complication, without long-term current use of insulin (HCC)  -     metFORMIN (GLUCOPHAGE) 500 MG tablet;  Take 1 tablet by mouth 2 times daily (with meals), Disp-180 tablet, R-1Normal  4. Ankylosing spondylitis of multiple sites in spine (HCC)  -     meloxicam (MOBIC) 15 MG tablet; TAKE ONE TABLET BY MOUTH DAILY, Disp-90 tablet, R-1Normal  5. Moderately severe recurrent major depression (HCC)  -     DULoxetine (CYMBALTA) 60 MG extended release capsule; Take 1 capsule by mouth daily, Disp-90 capsule, R-1Normal  -     citalopram (CELEXA) 20 MG tablet; Take 1 tablet by mouth daily, Disp-30 tablet, R-1Normal       1. DM: Stable, A1C from 7.3 to 7.6 - has made recent adjustments to eating habits and times. Discussed trying to walk 20 minutes at least 2-3 times a week to increase activity levels. Avoid concentrated sweets and processed sugars. Follow up in 3 months. 2. Ankylosing spondylitis of spine: Stable on current dose of mobic - works well for him. Denies any side effects with use. Follow up in 3 months. 3 Depression: He would like to add in low dose celexa in addition to cymbalta therapy. Discussed potential side effects such as GI bleed with use of mobic - he states he did well with this before but understood risks. If thoughts of hurting self or others develop call office for further evaluation or seek emergent care in ER. Follow up in 1 month. RTO if symptoms worsen or fail to improve  Pt agreeable with plan      Patient given educational materials - see patientinstructions. Discussed use, benefit, and side effects of prescribed medications. All patient questions answered. Pt voiced understanding. Reviewed health maintenance. Instructed to continue current medications, diet andexercise. 1.  Wallace received counseling on the following healthy behaviors: nutrition, exercise and medication adherence  2. Patient given educationalmaterials when available - see patient instructions when applicable  3. Discussed use, benefit, and side effects of prescribed medications. Barriersto medication compliance addressed.   All patient questions answered. Pt voiced understanding. 4.  Reviewed prior labs and health maintenance when applicable. 5.  Continue current medications, diet and exercise. CompletedRefills   Requested Prescriptions     Signed Prescriptions Disp Refills    nitroGLYCERIN (NITROSTAT) 0.4 MG SL tablet 25 tablet 0     Sig: Place 1 tablet under the tongue every 5 minutes as needed for Chest pain up to max of 3 total doses. If no relief after 1 dose, call 911.  metoprolol succinate (TOPROL XL) 50 MG extended release tablet 90 tablet 1     Sig: Take 1 tablet by mouth daily    metFORMIN (GLUCOPHAGE) 500 MG tablet 180 tablet 1     Sig: Take 1 tablet by mouth 2 times daily (with meals)    meloxicam (MOBIC) 15 MG tablet 90 tablet 1     Sig: TAKE ONE TABLET BY MOUTH DAILY    DULoxetine (CYMBALTA) 60 MG extended release capsule 90 capsule 1     Sig: Take 1 capsule by mouth daily    citalopram (CELEXA) 20 MG tablet 30 tablet 1     Sig: Take 1 tablet by mouth daily       This note was transcribed using dictation with Dragon services. Efforts were made to correct any errors but some words may be misinterpreted.     Electronically signed by Devante Alvarez CNP on 5/11/2021 at 8:03 PM

## 2021-05-11 ASSESSMENT — ENCOUNTER SYMPTOMS
COLOR CHANGE: 0
DIARRHEA: 0
BACK PAIN: 0
NAUSEA: 0
ABDOMINAL PAIN: 0
EYE PAIN: 0
VOMITING: 0
BLURRED VISION: 0
PHOTOPHOBIA: 0
SORE THROAT: 0
SINUS PAIN: 0
SINUS PRESSURE: 0
SHORTNESS OF BREATH: 0
CHEST TIGHTNESS: 0
CONSTIPATION: 0

## 2021-05-17 RX ORDER — BUSPIRONE HYDROCHLORIDE 10 MG/1
TABLET ORAL
Qty: 180 TABLET | Refills: 0 | Status: SHIPPED | OUTPATIENT
Start: 2021-05-17 | End: 2021-09-09

## 2021-05-17 NOTE — TELEPHONE ENCOUNTER
Last visit: 05/10/2021  Last Med refill: 02/20/2021  Does patient have enough medication for 72 hours: No:     Next Visit Date:  Future Appointments   Date Time Provider Apolinar Samra   6/10/2021 11:00 AM Carole Kapadia  [address] [facility]   Topic Date Due    Hepatitis C screen  Never done    DTaP/Tdap/Td vaccine (1 - Tdap) Never done    Shingles Vaccine (1 of 2) Never done    Annual Wellness Visit (AWV)  Never done    Diabetic retinal exam  01/29/2021    Potassium monitoring  06/16/2021    Creatinine monitoring  06/16/2021    Diabetic foot exam  09/22/2021    Diabetic microalbuminuria test  09/22/2021    Lipid screen  12/16/2021    A1C test (Diabetic or Prediabetic)  05/10/2022    Colon cancer screen fecal DNA test (Cologuard)  03/25/2024    Flu vaccine  Completed    Pneumococcal 65+ years Vaccine  Completed    COVID-19 Vaccine  Completed    AAA screen  Completed    Hepatitis A vaccine  Aged Out    Hib vaccine  Aged Out    Meningococcal (ACWY) vaccine  Aged Out       Hemoglobin A1C (%)   Date Value   05/10/2021 7.6   12/22/2020 7.3   09/22/2020 6.6             ( goal A1C is < 7)   No results found for: LABMICR  LDL Cholesterol (mg/dL)   Date Value   12/16/2020 39   12/10/2017 99       (goal LDL is <100)   AST (U/L)   Date Value   01/30/2020 14     ALT (U/L)   Date Value   01/30/2020 24     BUN (mg/dL)   Date Value   01/30/2020 17     BP Readings from Last 3 Encounters:   05/10/21 132/74   12/22/20 122/70   09/22/20 122/70          (goal 120/80)    All Future Testing planned in [facility]  Lab Frequency Next Occurrence               Patient Active Problem List:     Ankylosing spondylitis of multiple sites in spine (HCC)     Chronic silicosis ([address]     Moderate persistent asthma without complication     Coronary artery disease involving native coronary artery of native heart without angina pectoris     History of ST elevation myocardial infarction (STEMI) Morbidly obese ([facility]

## 2021-06-10 ENCOUNTER — OFFICE VISIT (OUTPATIENT)
Dept: FAMILY MEDICINE CLINIC | Age: 71
End: 2021-06-10
Payer: COMMERCIAL

## 2021-06-10 VITALS
WEIGHT: 301 LBS | SYSTOLIC BLOOD PRESSURE: 136 MMHG | HEART RATE: 70 BPM | HEIGHT: 69 IN | DIASTOLIC BLOOD PRESSURE: 70 MMHG | BODY MASS INDEX: 44.58 KG/M2

## 2021-06-10 DIAGNOSIS — J62.8 CHRONIC SILICOSIS (HCC): ICD-10-CM

## 2021-06-10 DIAGNOSIS — Z13.6 SCREENING FOR AAA (ABDOMINAL AORTIC ANEURYSM): ICD-10-CM

## 2021-06-10 DIAGNOSIS — F33.2 MODERATELY SEVERE RECURRENT MAJOR DEPRESSION (HCC): Primary | ICD-10-CM

## 2021-06-10 DIAGNOSIS — E66.01 MORBIDLY OBESE (HCC): ICD-10-CM

## 2021-06-10 DIAGNOSIS — L30.9 ECZEMA OF FACE: ICD-10-CM

## 2021-06-10 PROCEDURE — 99214 OFFICE O/P EST MOD 30 MIN: CPT | Performed by: INTERNAL MEDICINE

## 2021-06-10 ASSESSMENT — ENCOUNTER SYMPTOMS
COUGH: 0
CHEST TIGHTNESS: 0
WHEEZING: 0
ANAL BLEEDING: 0
SHORTNESS OF BREATH: 0
ABDOMINAL PAIN: 0
NAUSEA: 0
VOMITING: 0
CHOKING: 0
BLOOD IN STOOL: 0
DIARRHEA: 0
CONSTIPATION: 0

## 2021-06-10 ASSESSMENT — VISUAL ACUITY: OU: 1

## 2021-06-10 NOTE — PROGRESS NOTES
Subjective:       Patient ID:     Jourdan Michel is a 70 y.o. male who presents for   Chief Complaint   Patient presents with    1 Month Follow-Up     discuss AAA results        HPI:  Nursing note reviewed and discussed with patient. Here for 1 month f/u   Depression - celexa was added to cymbalta a month ago, feels a lot better. Sleeping well, no SI/HI, aduditory or visual hallucination. He is also more active now that summer is here. happier overall. Rash on face - occurs every year in summer. Itchy red blotches. Has been using some OTC itch cream, claritin without relief. He increased the claritin to BID which seems to have helped some, not sure if he could continue staying on that. AAA screen attempted via [address], unable to get images due to body habitus, CT noncontrast recommended. Pt has questions about this. Patient's medications, allergies, past medical, surgical, social and family histories were reviewed and updated as appropriate.     Past Medical History:   Diagnosis Date    [facility] sp[facility]yl[facility]     Arthritis     Asthma not dependent on systemic steroids without complication     CAD (coronary artery disease)     Hypertension     DEB treated with BiPAP 2017    Pulmonary hypertension ([facility]     Silicosis ([facility]     STEMI (ST elevation myocardial infarction) ([facility] 2017     Past Surgical History:   Procedure Laterality Date    CORONARY ANGIOPLASTY WITH STENT PLACEMENT  2017    JOINT REPLACEMENT      right hip       Social History     Tobacco Use    Smoking status: Former Smoker     Packs/day: 0.25     Years: 1.00     Pack years: 0.25     Types: Cigarettes     Quit date: 1975     Years since quittin.5    Smokeless tobacco: Never Used   Substance Use Topics    Alcohol use: Yes     Comment: meryl social drinker      Patient Active Problem List   Diagnosis    Ankylosing spondylitis of multiple sites in spine ([facility]    Chronic silicosis ([facility]    Moderate persistent asthma without complication    Coronary artery disease involving native coronary artery of native heart without angina pectoris    History of ST elevation myocardial infarction (STEMI)    Morbidly obese ([facility]         Prior to Visit Medications    Medication Sig Taking? Authorizing Provider   busPIRone (BUSPAR) 10 MG tablet TAKE ONE TABLET BY MOUTH TWICE A DAY Yes Carole Pettit MD   nitroGLYCERIN (NITROSTAT) 0.4 MG SL tablet Place 1 tablet under the tongue every 5 minutes as needed for Chest pain up to max of 3 total doses. If no relief after 1 dose, call 911. Yes JULIA Minaya NP   metoprolol succinate (TOPROL XL) 50 MG extended release tablet Take 1 tablet by mouth daily Yes JULIA Minaya NP   metFORMIN (GLUCOPHAGE) 500 MG tablet Take 1 tablet by mouth 2 times daily (with meals) Yes JULIA Minaya NP   meloxicam (MOBIC) 15 MG tablet TAKE ONE TABLET BY MOUTH DAILY Yes JULIA Minaya NP   DULoxetine (CYMBALTA) 60 MG extended release capsule Take 1 capsule by mouth daily Yes JULIA Minaya NP   citalopram (CELEXA) 20 MG tablet Take 1 tablet by mouth daily Yes JULIA Minaya NP   BRILINTA 60 MG TABS tablet Take 1 tablet by mouth daily Yes JULIA Chapa CNP   ketoconazole (NIZORAL) 2 % cream Apply topically daily. Yes Evan Hull MD   Loratadine (CLARITIN PO) Take 10 mg by mouth daily Yes Historical MD Miracle   Blood Glucose Monitoring Suppl (BLOOD GLUCOSE SYSTEM MICHAELA) KIT Test once a day Yes Evan Hull MD   blood glucose monitor strips Test 1 time a day & as needed for symptoms of irregular blood glucose.  Yes Evan Hull MD   Lancets MISC 1 each by Does not apply route 2 times daily Yes Evan Hull MD   amLODIPine (NORVASC) 2.5 MG tablet TAKE 1 Tablet  BY Vilma Moe MD   lisinopril (PRINIVIL;ZESTRIL) 10 MG tablet Take 1 tablet by mouth daily Yes Octavio Sharpe MD atorvastatin (LIPITOR) 80 MG tablet Take 1 tablet by mouth nightly Yes Gayla Lopez MD   aspirin 81 MG chewable tablet Take 1 tablet by mouth daily Yes Gayla Lopez MD   montelukast (SINGULAIR) 10 MG tablet Take 10 mg by mouth nightly Yes Historical Provider, MD   albuterol (PROVENTIL) (2.5 MG/3ML) 0.083% nebulizer solution Take 2.5 mg by nebulization 4 times daily Yes Historical Provider, MD   Fluticasone Furoate-Vilanterol (BREO ELLIPTA) 200-25 MCG/INH AEPB Inhale 200 mcg into the lungs daily Yes Historical Provider, MD     Review of Systems  Review of Systems   Constitutional: Negative for fatigue, fever and unexpected weight change. Respiratory: Negative for cough, choking, chest tightness, shortness of breath and wheezing. Cardiovascular: Negative for chest pain, palpitations and leg swelling. Gastrointestinal: Negative for abdominal pain, anal bleeding, blood in stool, constipation, diarrhea, nausea and vomiting. Endocrine: Negative. Musculoskeletal: Negative for joint swelling and myalgias. Skin: Positive for rash. Neurological: Negative for dizziness. Psychiatric/Behavioral: Negative for sleep disturbance. All other systems reviewed and are negative. Objective:       Physical Exam:  /70   Pulse 70   Ht 5' 8.9\" (1.75 m)   Wt (!) 301 lb (136.5 kg)   BMI 44.58 kg/m²    Wt Readings from Last 3 Encounters:   06/10/21 (!) 301 lb (136.5 kg)   05/10/21 (!) 306 lb (138.8 kg)   12/22/20 (!) 308 lb (139.7 kg)       Physical Exam  Vitals and nursing note reviewed. Constitutional:       General: He is not in acute distress. Appearance: He is well-developed. He is obese. He is not ill-appearing. Eyes:      General: Lids are normal. Vision grossly intact. Cardiovascular:      Rate and Rhythm: Normal rate and regular rhythm. Heart sounds: Normal heart sounds, S1 normal and S2 normal. No murmur heard. No friction rub. No gallop.     Pulmonary:      Effort: Pulmonary effort is normal. No respiratory distress. Breath sounds: Normal breath sounds. No wheezing. Abdominal:      General: Bowel sounds are normal.      Palpations: Abdomen is soft. There is no mass. Tenderness: There is no abdominal tenderness. There is no guarding. Musculoskeletal:         General: Normal range of motion. Skin:     General: Skin is warm and dry. Capillary Refill: Capillary refill takes less than 2 seconds. Findings: Rash present. Rash is macular and scaling. Comments: Eczematous rash on face with macules, papules and excoriation    Neurological:      General: No focal deficit present. Mental Status: He is alert and oriented to person, place, and time. Data Review  Office Visit on 05/10/2021   Component Date Value    Hemoglobin A1C 05/10/2021 7.6           Assessment/Plan:      1. Moderately severe recurrent major depression ([address]  Continue cymbalta and celexa     2. Eczema of face    - hydrocortisone 2.5 % ointment; Apply topically 2 times daily. Dispense: 100 g; Refill: 1    3. Morbidly obese ([address]  5lb weight loss over last month  Provided counseling re: consistent bedtimes, consistent exercise 20-30 minutes per day (goal 150 min/week of moderate intensity exercise like walking) and consistent eating habits that include vegetables, fruit and whole grain foods along with healthy fats and protein  Avoid processed and sugary foods, large amounts of alcoholic drinks     4. Chronic silicosis ([address]  F/u pulmonology     5.  Screening for AAA (abdominal aortic aneurysm)  - CT ABDOMEN WO CONTRAST Additional Contrast? None; Future           Health Maintenance Due   Topic Date Due    Hepatitis C screen  Never done    DTaP/Tdap/Td vaccine (1 - Tdap) Never done    Shingles Vaccine (1 of 2) Never done    Annual Wellness Visit (AWV)  Never done    Diabetic retinal exam  01/29/2021    Potassium monitoring  06/16/2021    Creatinine monitoring  06/16/2021 Electronically signed by James Gardner MD on 6/10/2021 at 11:07 AM

## 2021-06-16 DIAGNOSIS — E11.9 TYPE 2 DIABETES MELLITUS WITHOUT COMPLICATION, WITHOUT LONG-TERM CURRENT USE OF INSULIN (HCC): Primary | ICD-10-CM

## 2021-06-29 DIAGNOSIS — E11.59 TYPE 2 DIABETES MELLITUS WITH OTHER CIRCULATORY COMPLICATION, WITHOUT LONG-TERM CURRENT USE OF INSULIN (HCC): ICD-10-CM

## 2021-09-09 RX ORDER — BUSPIRONE HYDROCHLORIDE 10 MG/1
TABLET ORAL
Qty: 180 TABLET | Refills: 1 | Status: SHIPPED | OUTPATIENT
Start: 2021-09-09 | End: 2022-01-18 | Stop reason: SDUPTHER

## 2021-09-09 NOTE — TELEPHONE ENCOUNTER
Last visit:  6/10/21  Last Med refill: 5/17/21  Does patient have enough medication for 72 hours: Yes    Next Visit Date:  Future Appointments   Date Time Provider Apolinar Cabrales   9/15/2021  2:15 PM Carole Heard  [address] Maintenance   Topic Date Due    Hepatitis C screen  Never done    DTaP/Tdap/Td vaccine (1 - Tdap) Never done    Shingles Vaccine (1 of 2) Never done    Annual Wellness Visit (AWV)  Never done    Diabetic retinal exam  01/29/2021    Potassium monitoring  06/16/2021    Creatinine monitoring  06/16/2021    Flu vaccine (1) 09/01/2021    Diabetic foot exam  09/22/2021    Diabetic microalbuminuria test  09/22/2021    Lipid screen  12/16/2021    A1C test (Diabetic or Prediabetic)  05/10/2022    Colon cancer screen fecal DNA test (Cologuard)  03/25/2024    Pneumococcal 65+ years Vaccine  Completed    COVID-19 Vaccine  Completed    AAA screen  Completed    Hepatitis A vaccine  Aged Out    Hib vaccine  Aged Out    Meningococcal (ACWY) vaccine  Aged Out       Hemoglobin A1C (%)   Date Value   05/10/2021 7.6   12/22/2020 7.3   09/22/2020 6.6             ( goal A1C is < 7)   No results found for: LABMICR  LDL Cholesterol (mg/dL)   Date Value   12/16/2020 39   12/10/2017 99       (goal LDL is <100)   AST (U/L)   Date Value   01/30/2020 14     ALT (U/L)   Date Value   01/30/2020 24     BUN (mg/dL)   Date Value   01/30/2020 17     BP Readings from Last 3 Encounters:   06/10/21 136/70   05/10/21 132/74   12/22/20 122/70          (goal 120/80)    All Future Testing planned in [facility]  Lab Frequency Next Occurrence   CT ABDOMEN WO CONTRAST Additional Contrast? None Once 09/09/2021               Patient Active Problem List:     Ankylosing spondylitis of multiple sites in spine ([facility]     Chronic silicosis ([facility]     Moderate persistent asthma without complication     Coronary artery disease involving native coronary artery of native heart without angina pectoris History of ST elevation myocardial infarction (STEMI)     Morbidly obese ([facility])     Moderately severe recurrent major depression ([facility])     Eczema of face

## 2021-09-15 ENCOUNTER — OFFICE VISIT (OUTPATIENT)
Dept: FAMILY MEDICINE CLINIC | Age: 71
End: 2021-09-15
Payer: COMMERCIAL

## 2021-09-15 DIAGNOSIS — F33.2 MODERATELY SEVERE RECURRENT MAJOR DEPRESSION (HCC): ICD-10-CM

## 2021-09-15 DIAGNOSIS — E11.59 TYPE 2 DIABETES MELLITUS WITH OTHER CIRCULATORY COMPLICATION, WITHOUT LONG-TERM CURRENT USE OF INSULIN (HCC): Primary | ICD-10-CM

## 2021-09-15 DIAGNOSIS — Z23 NEED FOR INFLUENZA VACCINATION: ICD-10-CM

## 2021-09-15 DIAGNOSIS — I25.10 CORONARY ARTERY DISEASE INVOLVING NATIVE CORONARY ARTERY OF NATIVE HEART WITHOUT ANGINA PECTORIS: ICD-10-CM

## 2021-09-15 DIAGNOSIS — N40.1 BENIGN PROSTATIC HYPERPLASIA WITH NOCTURIA: ICD-10-CM

## 2021-09-15 DIAGNOSIS — M45.0 ANKYLOSING SPONDYLITIS OF MULTIPLE SITES IN SPINE (HCC): ICD-10-CM

## 2021-09-15 DIAGNOSIS — Z13.0 SCREENING, ANEMIA, DEFICIENCY, IRON: ICD-10-CM

## 2021-09-15 DIAGNOSIS — Z12.5 ENCOUNTER FOR SCREENING FOR MALIGNANT NEOPLASM OF PROSTATE: ICD-10-CM

## 2021-09-15 DIAGNOSIS — R35.1 BENIGN PROSTATIC HYPERPLASIA WITH NOCTURIA: ICD-10-CM

## 2021-09-15 DIAGNOSIS — J62.8 CHRONIC SILICOSIS (HCC): ICD-10-CM

## 2021-09-15 DIAGNOSIS — Z13.29 SCREENING FOR THYROID DISORDER: ICD-10-CM

## 2021-09-15 DIAGNOSIS — E78.2 MIXED HYPERLIPIDEMIA: ICD-10-CM

## 2021-09-15 PROBLEM — E11.9 DIABETES MELLITUS (HCC): Status: ACTIVE | Noted: 2021-09-15

## 2021-09-15 LAB — HBA1C MFR BLD: 8.4 %

## 2021-09-15 PROCEDURE — 99214 OFFICE O/P EST MOD 30 MIN: CPT | Performed by: INTERNAL MEDICINE

## 2021-09-15 PROCEDURE — 90694 VACC AIIV4 NO PRSRV 0.5ML IM: CPT | Performed by: INTERNAL MEDICINE

## 2021-09-15 PROCEDURE — 83036 HEMOGLOBIN GLYCOSYLATED A1C: CPT | Performed by: INTERNAL MEDICINE

## 2021-09-15 PROCEDURE — G0008 ADMIN INFLUENZA VIRUS VAC: HCPCS | Performed by: INTERNAL MEDICINE

## 2021-09-15 PROCEDURE — 3052F HG A1C>EQUAL 8.0%<EQUAL 9.0%: CPT | Performed by: INTERNAL MEDICINE

## 2021-09-15 RX ORDER — TAMSULOSIN HYDROCHLORIDE 0.4 MG/1
0.4 CAPSULE ORAL DAILY
Qty: 30 CAPSULE | Refills: 5 | Status: SHIPPED | OUTPATIENT
Start: 2021-09-15 | End: 2022-02-07

## 2021-09-15 RX ORDER — MELOXICAM 15 MG/1
TABLET ORAL
Qty: 90 TABLET | Refills: 1 | Status: SHIPPED | OUTPATIENT
Start: 2021-09-15 | End: 2022-01-18 | Stop reason: SDUPTHER

## 2021-09-15 RX ORDER — DULOXETIN HYDROCHLORIDE 60 MG/1
60 CAPSULE, DELAYED RELEASE ORAL DAILY
Qty: 90 CAPSULE | Refills: 1 | Status: SHIPPED | OUTPATIENT
Start: 2021-09-15 | End: 2022-01-18 | Stop reason: SDUPTHER

## 2021-09-15 RX ORDER — NITROGLYCERIN 0.4 MG/1
0.4 TABLET SUBLINGUAL EVERY 5 MIN PRN
Qty: 25 TABLET | Refills: 0 | Status: SHIPPED | OUTPATIENT
Start: 2021-09-15

## 2021-09-15 RX ORDER — CITALOPRAM 20 MG/1
TABLET ORAL
Qty: 90 TABLET | Refills: 1 | Status: SHIPPED | OUTPATIENT
Start: 2021-09-15 | End: 2022-01-18 | Stop reason: SDUPTHER

## 2021-09-15 RX ORDER — METOPROLOL SUCCINATE 50 MG/1
50 TABLET, EXTENDED RELEASE ORAL DAILY
Qty: 90 TABLET | Refills: 1 | Status: SHIPPED | OUTPATIENT
Start: 2021-09-15 | End: 2022-01-18 | Stop reason: SDUPTHER

## 2021-09-15 ASSESSMENT — ENCOUNTER SYMPTOMS
ABDOMINAL PAIN: 0
COUGH: 0
WHEEZING: 0
CHOKING: 0
VOMITING: 0
SHORTNESS OF BREATH: 0
DIARRHEA: 0
NAUSEA: 0
CHEST TIGHTNESS: 0
BLOOD IN STOOL: 0
CONSTIPATION: 0
ANAL BLEEDING: 0

## 2021-09-15 ASSESSMENT — VISUAL ACUITY: OU: 1

## 2021-09-15 NOTE — PROGRESS NOTES
Subjective:       Patient ID:     Max Chauhan is a 70 y.o. male who presents for   Chief Complaint   Patient presents with    Depression    Diabetes    Flu Vaccine       HPI:  Nursing note reviewed and discussed with patient. Diabetes - doing well with current regimen - has been eating more sugary foods lately and moving around a little less. Denies hypoglycemia, hyperglycemia, fatigue, polyuria, polydipsia, paresthesias, vision changes, dizziness. Eye exam was done. Not following with podiatry. Patient is taking ACE inhibitor/ARB. Complications of diabetes include dyslipiemia . Hypertension-tolerating current regimen without chest pain, palpitations, dizziness, peripheral edema, dyspnea on exertion, orthopnea, paroxysmal nocturnal dyspnea. Hyperlipidemia-tolerating current regimen without myalgias, dyspepsia, jaundice. Mostly compliant with diet recommendations for low carb diet, not very compliant with exercise recommendations. Cardiovascular risk factors: advanced age (older than 54 for men, 72 for women), diabetes mellitus, dyslipidemia, hypertension, male gender, obesity (BMI >= 30 kg/m2) and sedentary lifestyle  Following with Dr Geovanna Pierre - has visit on 12/2/21  Difficulty staying asleep, no difficulty falling asleep. Not sure why he has to wake up, usually needs to urinate. Urinating at least three times a night. IPSS Questionnaire (AUA-7): Over the past month    1)  How often have you had a sensation of not emptying your bladder completely after you finish urinating? 1 - Less than 1 time in 5   2)  How often have you had to urinate again less than two hours after you finished urinating? 3 - About half the time   3)  How often have you found you stopped and started again several times when you urinated? 0 - Not at all   4) How difficult have you found it to postpone urination?   4 - More than half the time   5) How often have you had a weak urinary stream?  0 - Not at all   6) How often have you had to push or strain to begin urination? 0 - Not at all   7) How many times did you most typically get up to urinate from the time you went to bed until the time you got up in the morning? 3 - 3 times   Total score:  0-7 mildly symptomatic    8-19 moderately symptomatic    20-35 severely symptomatic           Patient's medications, allergies, past medical, surgical, social and family histories were reviewed and updated as appropriate. Past Medical History:   Diagnosis Date    [facility]ylitis ([facility]     Arthritis     Asthma not dependent on systemic steroids without complication     CAD (coronary artery disease)     Hypertension     DEB treated with BiPAP 2017    Pulmonary hypertension ([facility]     Silicosis ([facility] 5968    STEMI (ST elevation myocardial infarction) ([facility] 2017     Past Surgical History:   Procedure Laterality Date    CORONARY ANGIOPLASTY WITH STENT PLACEMENT  2017    JOINT REPLACEMENT      right hip       Social History     Tobacco Use    Smoking status: Former Smoker     Packs/day: 0.25     Years: 1.00     Pack years: 0.25     Types: Cigarettes     Quit date: 1975     Years since quittin.8    Smokeless tobacco: Never Used   Substance Use Topics    Alcohol use: Yes     Comment: meryl social drinker      Patient Active Problem List   Diagnosis    Ankylosing spondylitis of multiple sites in spine ([facility]    Chronic silicosis ([facility]    Moderate persistent asthma without complication    Coronary artery disease involving native coronary artery of native heart without angina pectoris    History of ST elevation myocardial infarction (STEMI)    Morbidly obese ([facility]    Moderately severe recurrent major depression ([facility]    Eczema of face         Prior to Visit Medications    Medication Sig Taking?  Authorizing Provider   busPIRone (BUSPAR) 10 MG tablet TAKE ONE TABLET BY MOUTH TWICE A DAY Yes Ada Benites MD citalopram (CELEXA) 20 MG tablet TAKE ONE TABLET BY MOUTH DAILY Yes Kenzie Portillo APRN - NP   nitroGLYCERIN (NITROSTAT) 0.4 MG SL tablet Place 1 tablet under the tongue every 5 minutes as needed for Chest pain up to max of 3 total doses. If no relief after 1 dose, call 911. Yes Kenzie Portillo APRN - NP   metoprolol succinate (TOPROL XL) 50 MG extended release tablet Take 1 tablet by mouth daily Yes Kenzie Portillo APRN - NP   metFORMIN (GLUCOPHAGE) 500 MG tablet Take 1 tablet by mouth 2 times daily (with meals) Yes Kenzie Portillo APRN - NP   meloxicam (MOBIC) 15 MG tablet TAKE ONE TABLET BY MOUTH DAILY Yes Kenzie Portillo APRN - NP   DULoxetine (CYMBALTA) 60 MG extended release capsule Take 1 capsule by mouth daily Yes Kenzie Portillo APRN - NP   BRILINTA 60 MG TABS tablet Take 1 tablet by mouth daily Yes Juan J Smith, APRN - DONNY   Loratadine (CLARITIN PO) Take 10 mg by mouth daily Yes Historical Provider, MD   Blood Glucose Monitoring Suppl (BLOOD GLUCOSE SYSTEM MICHAELA) KIT Test once a day Yes Radha Guardado MD   blood glucose monitor strips Test 1 time a day & as needed for symptoms of irregular blood glucose.  Yes Radha Guardado MD   Lancets MISC 1 each by Does not apply route 2 times daily Yes Radha Guardado MD   amLODIPine (NORVASC) 2.5 MG tablet TAKE 1 Tablet  BY MOUTH EVERY DAY Yes Varinder Culp MD   lisinopril (PRINIVIL;ZESTRIL) 10 MG tablet Take 1 tablet by mouth daily Yes Dulce Bennett MD   atorvastatin (LIPITOR) 80 MG tablet Take 1 tablet by mouth nightly Yes Dulce Bennett MD   aspirin 81 MG chewable tablet Take 1 tablet by mouth daily Yes Dulce Bennett MD   montelukast (SINGULAIR) 10 MG tablet Take 10 mg by mouth nightly Yes Historical Provider, MD   albuterol (PROVENTIL) (2.5 MG/3ML) 0.083% nebulizer solution Take 2.5 mg by nebulization 4 times daily Yes Historical Provider, MD   Fluticasone Furoate-Vilanterol (BREO ELLIPTA) 200-25 MCG/INH AEPB Inhale 200 mcg into the lungs daily Yes Historical Provider, MD   ketoconazole (NIZORAL) 2 % cream Apply topically daily. Patient not taking: Reported on 9/15/2021  Carole Knight MD     Review of Systems  Review of Systems   Constitutional: Negative for fatigue, fever and unexpected weight change. Respiratory: Negative for cough, choking, chest tightness, shortness of breath and wheezing. Cardiovascular: Negative for chest pain, palpitations and leg swelling. Gastrointestinal: Negative for abdominal pain, anal bleeding, blood in stool, constipation, diarrhea, nausea and vomiting. Endocrine: Negative. Musculoskeletal: Negative for joint swelling and myalgias. Skin: Negative. Neurological: Negative for dizziness. Psychiatric/Behavioral: Negative for sleep disturbance. All other systems reviewed and are negative. Objective:       Physical Exam:  There were no vitals taken for this visit. Physical Exam  Vitals and nursing note reviewed. Constitutional:       General: He is not in acute distress. Appearance: He is well-developed. He is not ill-appearing. Eyes:      General: Lids are normal. Vision grossly intact. Cardiovascular:      Rate and Rhythm: Normal rate and regular rhythm. Heart sounds: Normal heart sounds, S1 normal and S2 normal. No murmur heard. No friction rub. No gallop. Pulmonary:      Effort: Pulmonary effort is normal. No respiratory distress. Breath sounds: Normal breath sounds. No wheezing. Abdominal:      General: Bowel sounds are normal.      Palpations: Abdomen is soft. There is no mass. Tenderness: There is no abdominal tenderness. There is no guarding. Musculoskeletal:         General: Normal range of motion. Skin:     General: Skin is warm and dry. Capillary Refill: Capillary refill takes less than 2 seconds. Neurological:      General: No focal deficit present. Mental Status: He is alert and oriented to person, place, and time.      Diabetic Foot Exam    Pulses:  normal,   Edema: trace  Skin: normal    Sensory exam  Monofilament Sensation:  normal  (minimum of 5 random plantar locations tested, avoid callous areas - > 1 area with absence of sensation is + for neuropathy)      Plus one of the following  Pinprick:  Intact  Proprioception:  Intact  Vibration (128 Hz):  N/A          Data Review  A1c today 8.4%        Assessment/Plan:      1. Type 2 diabetes mellitus with other circulatory complication, without long-term current use of insulin ([facility])    DIABETES FOOT EXAM  - POCT glycosylated hemoglobin (Hb A1C)  - metFORMIN (GLUCOPHAGE) 500 MG tablet; Take 1 tablet by mouth 2 times daily (with meals)  Dispense: 180 tablet; Refill: 1  - Microalbumin, Ur; Future    2. Moderately severe recurrent major depression ([facility])  - citalopram (CELEXA) 20 MG tablet; TAKE ONE TABLET BY MOUTH DAILY  Dispense: 90 tablet; Refill: 1  - DULoxetine (CYMBALTA) 60 MG extended release capsule; Take 1 capsule by mouth daily  Dispense: 90 capsule; Refill: 1    3. Ankylosing spondylitis of multiple sites in spine ([facility])  - meloxicam (MOBIC) 15 MG tablet; TAKE ONE TABLET BY MOUTH DAILY  Dispense: 90 tablet; Refill: 1    4. Coronary artery disease involving native coronary artery of native heart without angina pectoris  - nitroGLYCERIN (NITROSTAT) 0.4 MG SL tablet; Place 1 tablet under the tongue every 5 minutes as needed for Chest pain up to max of 3 total doses. If no relief after 1 dose, call 911. Dispense: 25 tablet; Refill: 0  - metoprolol succinate (TOPROL XL) 50 MG extended release tablet; Take 1 tablet by mouth daily  Dispense: 90 tablet; Refill: 1    5. Benign prostatic hyperplasia with nocturia  - tamsulosin (FLOMAX) 0.4 MG capsule; Take 1 capsule by mouth daily  Dispense: 30 capsule; Refill: 5    6. Chronic silicosis ([address]  Following with pulm    7. Mixed hyperlipidemia  Continue current regimen   - Lipid Panel; Future  - Comprehensive Metabolic Panel; Future    8.  Need for influenza vaccination  - INFLUENZA, QUADV, ADJUVANTED, 72 YRS =, IM, PF, PREFILL SYR, 0.5ML (FLUAD)    9. Screening for thyroid disorder  - TSH with Reflex; Future    10. Screening, anemia, deficiency, iron  - CBC; Future    11. Encounter for screening for malignant neoplasm of prostate  - PSA screening;  Future           Health Maintenance Due   Topic Date Due    Hepatitis C screen  Never done    DTaP/Tdap/Td vaccine (1 - Tdap) Never done    Shingles Vaccine (1 of 2) Never done    Annual Wellness Visit (AWV)  Never done    Diabetic retinal exam  01/29/2021    Potassium monitoring  06/16/2021    Creatinine monitoring  06/16/2021    Flu vaccine (1) 09/01/2021    Diabetic foot exam  09/22/2021    Diabetic microalbuminuria test  09/22/2021       Electronically signed by Lazara Hairston MD on 9/15/2021 at 2:43 PM

## 2021-09-20 VITALS
DIASTOLIC BLOOD PRESSURE: 78 MMHG | SYSTOLIC BLOOD PRESSURE: 132 MMHG | WEIGHT: 302.6 LBS | HEART RATE: 72 BPM | TEMPERATURE: 97.8 F | BODY MASS INDEX: 44.82 KG/M2 | OXYGEN SATURATION: 95 %

## 2022-01-11 ENCOUNTER — TELEPHONE (OUTPATIENT)
Dept: FAMILY MEDICINE CLINIC | Age: 72
End: 2022-01-11

## 2022-01-11 DIAGNOSIS — R19.7 DIARRHEA, UNSPECIFIED TYPE: Primary | ICD-10-CM

## 2022-01-11 RX ORDER — DIPHENOXYLATE HYDROCHLORIDE AND ATROPINE SULFATE 2.5; .025 MG/1; MG/1
1 TABLET ORAL 4 TIMES DAILY PRN
Qty: 20 TABLET | Refills: 0 | Status: SHIPPED | OUTPATIENT
Start: 2022-01-11 | End: 2022-01-18 | Stop reason: ALTCHOICE

## 2022-01-11 NOTE — TELEPHONE ENCOUNTER
Pts mailbox is full    Pts wife's phone went straight to [facility] informing them of the RX sent in

## 2022-01-11 NOTE — TELEPHONE ENCOUNTER
Diarrhea    Patient complains of diarrhea:  Onset 2 days ago  Diarrhea approximately several times per day  Diarrhea is described as watery  Other symptoms include abd pain; no fevers   Can patient keep fluids down Yes  Does eating make it worse No    *This condition is eligible for an eVisit. If not already active, sign patient up for MyChart to improve access and communication with the provider. *    Wife states he has been taking otc loperamide with no relief. Asking if there is anything else he can try.

## 2022-01-18 ENCOUNTER — OFFICE VISIT (OUTPATIENT)
Dept: FAMILY MEDICINE CLINIC | Age: 72
End: 2022-01-18
Payer: MEDICARE

## 2022-01-18 VITALS
WEIGHT: 306.6 LBS | TEMPERATURE: 98.6 F | SYSTOLIC BLOOD PRESSURE: 136 MMHG | DIASTOLIC BLOOD PRESSURE: 76 MMHG | OXYGEN SATURATION: 96 % | HEART RATE: 68 BPM | BODY MASS INDEX: 45.41 KG/M2

## 2022-01-18 DIAGNOSIS — I25.10 CORONARY ARTERY DISEASE INVOLVING NATIVE CORONARY ARTERY OF NATIVE HEART WITHOUT ANGINA PECTORIS: ICD-10-CM

## 2022-01-18 DIAGNOSIS — F33.2 MODERATELY SEVERE RECURRENT MAJOR DEPRESSION (HCC): ICD-10-CM

## 2022-01-18 DIAGNOSIS — F41.1 GAD (GENERALIZED ANXIETY DISORDER): ICD-10-CM

## 2022-01-18 DIAGNOSIS — Z13.0 SCREENING, ANEMIA, DEFICIENCY, IRON: ICD-10-CM

## 2022-01-18 DIAGNOSIS — Z12.5 ENCOUNTER FOR SCREENING FOR MALIGNANT NEOPLASM OF PROSTATE: ICD-10-CM

## 2022-01-18 DIAGNOSIS — E78.2 MIXED HYPERLIPIDEMIA: ICD-10-CM

## 2022-01-18 DIAGNOSIS — Z13.29 SCREENING FOR THYROID DISORDER: ICD-10-CM

## 2022-01-18 DIAGNOSIS — M45.0 ANKYLOSING SPONDYLITIS OF MULTIPLE SITES IN SPINE (HCC): ICD-10-CM

## 2022-01-18 DIAGNOSIS — E11.59 TYPE 2 DIABETES MELLITUS WITH OTHER CIRCULATORY COMPLICATION, WITHOUT LONG-TERM CURRENT USE OF INSULIN (HCC): Primary | ICD-10-CM

## 2022-01-18 DIAGNOSIS — E66.01 CLASS 3 SEVERE OBESITY DUE TO EXCESS CALORIES WITH SERIOUS COMORBIDITY AND BODY MASS INDEX (BMI) OF 45.0 TO 49.9 IN ADULT (HCC): ICD-10-CM

## 2022-01-18 DIAGNOSIS — I10 PRIMARY HYPERTENSION: ICD-10-CM

## 2022-01-18 DIAGNOSIS — R35.1 BENIGN PROSTATIC HYPERPLASIA WITH NOCTURIA: ICD-10-CM

## 2022-01-18 DIAGNOSIS — J62.8 CHRONIC SILICOSIS (HCC): ICD-10-CM

## 2022-01-18 DIAGNOSIS — N40.1 BENIGN PROSTATIC HYPERPLASIA WITH NOCTURIA: ICD-10-CM

## 2022-01-18 DIAGNOSIS — J45.40 MODERATE PERSISTENT ASTHMA WITHOUT COMPLICATION: ICD-10-CM

## 2022-01-18 PROBLEM — E66.813 CLASS 3 SEVERE OBESITY DUE TO EXCESS CALORIES WITH SERIOUS COMORBIDITY AND BODY MASS INDEX (BMI) OF 45.0 TO 49.9 IN ADULT: Status: ACTIVE | Noted: 2020-09-22

## 2022-01-18 LAB
CREATININE URINE POCT: NORMAL
HBA1C MFR BLD: 7.6 %
MICROALBUMIN/CREAT 24H UR: NORMAL MG/G{CREAT}
MICROALBUMIN/CREAT UR-RTO: NORMAL

## 2022-01-18 PROCEDURE — 83036 HEMOGLOBIN GLYCOSYLATED A1C: CPT | Performed by: INTERNAL MEDICINE

## 2022-01-18 PROCEDURE — 82044 UR ALBUMIN SEMIQUANTITATIVE: CPT | Performed by: INTERNAL MEDICINE

## 2022-01-18 PROCEDURE — 3051F HG A1C>EQUAL 7.0%<8.0%: CPT | Performed by: INTERNAL MEDICINE

## 2022-01-18 PROCEDURE — 99214 OFFICE O/P EST MOD 30 MIN: CPT | Performed by: INTERNAL MEDICINE

## 2022-01-18 RX ORDER — LANCETS 30 GAUGE
1 EACH MISCELLANEOUS 2 TIMES DAILY
Qty: 100 EACH | Refills: 5 | Status: SHIPPED | OUTPATIENT
Start: 2022-01-18

## 2022-01-18 RX ORDER — MELOXICAM 15 MG/1
TABLET ORAL
Qty: 90 TABLET | Refills: 1 | Status: SHIPPED | OUTPATIENT
Start: 2022-01-18 | End: 2022-05-18 | Stop reason: SDUPTHER

## 2022-01-18 RX ORDER — BUSPIRONE HYDROCHLORIDE 10 MG/1
TABLET ORAL
Qty: 180 TABLET | Refills: 1 | Status: SHIPPED | OUTPATIENT
Start: 2022-01-18 | End: 2022-05-18 | Stop reason: SDUPTHER

## 2022-01-18 RX ORDER — GLUCOSAMINE HCL/CHONDROITIN SU 500-400 MG
CAPSULE ORAL
Qty: 100 STRIP | Refills: 3 | Status: SHIPPED | OUTPATIENT
Start: 2022-01-18

## 2022-01-18 RX ORDER — CITALOPRAM 20 MG/1
TABLET ORAL
Qty: 90 TABLET | Refills: 1 | Status: SHIPPED
Start: 2022-01-18 | End: 2022-05-18 | Stop reason: ALTCHOICE

## 2022-01-18 RX ORDER — DULOXETIN HYDROCHLORIDE 60 MG/1
60 CAPSULE, DELAYED RELEASE ORAL DAILY
Qty: 90 CAPSULE | Refills: 1 | Status: SHIPPED | OUTPATIENT
Start: 2022-01-18 | End: 2022-05-18 | Stop reason: SDUPTHER

## 2022-01-18 RX ORDER — METOPROLOL SUCCINATE 50 MG/1
50 TABLET, EXTENDED RELEASE ORAL DAILY
Qty: 90 TABLET | Refills: 1 | Status: SHIPPED | OUTPATIENT
Start: 2022-01-18 | End: 2022-05-18 | Stop reason: SDUPTHER

## 2022-01-18 SDOH — ECONOMIC STABILITY: FOOD INSECURITY: WITHIN THE PAST 12 MONTHS, THE FOOD YOU BOUGHT JUST DIDN'T LAST AND YOU DIDN'T HAVE MONEY TO GET MORE.: NEVER TRUE

## 2022-01-18 SDOH — ECONOMIC STABILITY: FOOD INSECURITY: WITHIN THE PAST 12 MONTHS, YOU WORRIED THAT YOUR FOOD WOULD RUN OUT BEFORE YOU GOT MONEY TO BUY MORE.: NEVER TRUE

## 2022-01-18 ASSESSMENT — SOCIAL DETERMINANTS OF HEALTH (SDOH): HOW HARD IS IT FOR YOU TO PAY FOR THE VERY BASICS LIKE FOOD, HOUSING, MEDICAL CARE, AND HEATING?: NOT VERY HARD

## 2022-01-18 NOTE — PATIENT INSTRUCTIONS
Your labs have been ordered today as fasting labs - this means you will need to fast overnight for at least 8 hours before you come in to get the blood drawn. You may have water, black tea or coffee without sugar or creamer prior to coming in for labs. Your lab results will be released to your [facility] account as they are resulted by the lab. I will send you a message regarding your results once I have had a chance to review them, usually within a couple of days, so if you have not heard from me it means I'm either waiting for some results, or that I have not had a moment to review the results.

## 2022-01-18 NOTE — PROGRESS NOTES
Subjective:       Patient ID:     Jonnathan Silvestre is a 70 y.o. male who presents for   Chief Complaint   Patient presents with    Diabetes       HPI:  Nursing note reviewed and discussed with patient. Here for follow-up   Diabetes - doing well with current regimen . Denies hypoglycemia, hyperglycemia, fatigue, polyuria, polydipsia, paresthesias, vision changes, dizziness. Eye exam was done at [facility]. Not following with podiatry. Patient is taking ACE inhibitor/ARB. Complications of diabetes include CAD. Hypertension-tolerating current regimen without chest pain, palpitations, dizziness, peripheral edema, dyspnea on exertion, orthopnea, paroxysmal nocturnal dyspnea. Hyperlipidemia-tolerating current regimen without myalgias, dyspepsia, jaundice. Depression - doing well on celexa and Cymbalta. Denies anhedonia, nervousness, sleep difficulty, racing thoughts, auditory or visual hallucination, thoughts of self harm, suicidal or homicidal ideation. Mostly compliant with diet recommendations for low carb diet, not very compliant with exercise recommendations. Cardiovascular risk factors: advanced age (older than 54 for men, 72 for women), diabetes mellitus, dyslipidemia, hypertension, male gender, obesity (BMI >= 30 kg/m2) and sedentary lifestyle  Following [facility] cardiology - Dr Hannah Orozco with Saad Youssef         Patient's medications, allergies, past medical, surgical, social and family histories were reviewed and updated as appropriate.     Past Medical History:   Diagnosis Date    [facility]ylosing spondylitis ([facility]     Arthritis     Asthma not dependent on systemic steroids without complication 68/11/3363    CAD (coronary artery disease)     Hypertension     DEB treated with BiPAP 12/07/2017    Pulmonary hypertension ([facility] [address] 77/23/3481    Silicosis ([facility] [address] 85/2557    STEMI (ST elevation myocardial infarction) ([facility] [address] 12/9/2017     Past Surgical History:   Procedure Laterality Date    CORONARY ANGIOPLASTY WITH STENT PLACEMENT  2017    JOINT REPLACEMENT      right hip       Social History     Tobacco Use    Smoking status: Former Smoker     Packs/day: 0.25     Years: 1.00     Pack years: 0.25     Types: Cigarettes     Quit date: 1975     Years since quittin.1    Smokeless tobacco: Never Used   Substance Use Topics    Alcohol use: Yes     Comment: meryl social drinker      Patient Active Problem List   Diagnosis    Ankylosing spondylitis of multiple sites in spine ([facility]    Chronic silicosis ([facility]    Moderate persistent asthma without complication    Coronary artery disease involving native coronary artery of native heart without angina pectoris    History of ST elevation myocardial infarction (STEMI)    Morbidly obese ([facility]    Moderately severe recurrent major depression ([facility]    Eczema of face    Benign prostatic hyperplasia with nocturia    Diabetes mellitus ([facility]         Prior to Visit Medications    Medication Sig Taking? Authorizing Provider   nitroGLYCERIN (NITROSTAT) 0.4 MG SL tablet Place 1 tablet under the tongue every 5 minutes as needed for Chest pain up to max of 3 total doses. If no relief after 1 dose, call 911.  Yes Ben Garcia MD   citalopram (CELEXA) 20 MG tablet TAKE ONE TABLET BY MOUTH DAILY Yes Carole Shipman MD   DULoxetine (CYMBALTA) 60 MG extended release capsule Take 1 capsule by mouth daily Yes Carole Shipman MD   meloxicam (MOBIC) 15 MG tablet TAKE ONE TABLET BY MOUTH DAILY Yes Carole Shipman MD   metFORMIN (GLUCOPHAGE) 500 MG tablet Take 1 tablet by mouth 2 times daily (with meals) Yes Carole Shipman MD   metoprolol succinate (TOPROL XL) 50 MG extended release tablet Take 1 tablet by mouth daily Yes Carole Shipman MD   tamsulosin (FLOMAX) 0.4 MG capsule Take 1 capsule by mouth daily Yes Carole Shipman MD   busPIRone (BUSPAR) 10 MG tablet TAKE ONE TABLET BY MOUTH TWICE A DAY Yes Carole Shipman MD   BRILINTA 60 MG TABS tablet Take 1 tablet by mouth daily Yes Arya King APRN - CNP   Loratadine (CLARITIN PO) Take 10 mg by mouth daily Yes Historical Provider, MD   Blood Glucose Monitoring Suppl (BLOOD GLUCOSE SYSTEM MICHAELA) KIT Test once a day Yes Ben Garcia MD   blood glucose monitor strips Test 1 time a day & as needed for symptoms of irregular blood glucose. Yes Ben Garcia MD   Lancets MISC 1 each by Does not apply route 2 times daily Yes Ben Garcia MD   amLODIPine (NORVASC) 2.5 MG tablet TAKE 1 Tablet  BY MOUTH EVERY DAY Yes Luh Kilgore MD   lisinopril (PRINIVIL;ZESTRIL) 10 MG tablet Take 1 tablet by mouth daily Yes Dhaval Brewer MD   atorvastatin (LIPITOR) 80 MG tablet Take 1 tablet by mouth nightly Yes Dhaval Brewer MD   aspirin 81 MG chewable tablet Take 1 tablet by mouth daily Yes Dhaval Brewer MD   montelukast (SINGULAIR) 10 MG tablet Take 10 mg by mouth nightly Yes Historical Provider, MD   albuterol (PROVENTIL) (2.5 MG/3ML) 0.083% nebulizer solution Take 2.5 mg by nebulization 4 times daily Yes Historical Provider, MD   Fluticasone Furoate-Vilanterol (BREO ELLIPTA) 200-25 MCG/INH AEPB Inhale 200 mcg into the lungs daily Yes Historical Provider, MD   ketoconazole (NIZORAL) 2 % cream Apply topically daily. Patient not taking: Reported on 9/15/2021  Carole Shipman MD     Review of Systems  Review of Systems   Constitutional: Negative for fatigue, fever and unexpected weight change. Respiratory: Negative for cough, choking, chest tightness, shortness of breath and wheezing. Cardiovascular: Negative for chest pain, palpitations and leg swelling. Gastrointestinal: Negative for abdominal pain, anal bleeding, blood in stool, constipation, diarrhea, nausea and vomiting. Endocrine: Negative. Musculoskeletal: Negative for joint swelling and myalgias. Skin: Negative. Neurological: Negative for dizziness.    Psychiatric/Behavioral: Negative for sleep disturbance. All other systems reviewed and are negative. Objective:       Physical Exam:  /76 (Site: Left Upper Arm, Position: Sitting, Cuff Size: Large Adult)   Pulse 68   Temp 98.6 °F (37 °C) (Temporal)   Wt (!) 306 lb 9.6 oz (139.1 kg)   SpO2 96%   BMI 45.41 kg/m²    Wt Readings from Last 3 Encounters:   01/18/22 (!) 306 lb 9.6 oz (139.1 kg)   09/20/21 (!) 302 lb 9.6 oz (137.3 kg)   06/10/21 (!) 301 lb (136.5 kg)       Physical Exam  Vitals and nursing note reviewed. Constitutional:       General: He is not in acute distress. Appearance: He is well-developed. He is not ill-appearing. Eyes:      General: Lids are normal. Vision grossly intact. Cardiovascular:      Rate and Rhythm: Normal rate and regular rhythm. Heart sounds: Normal heart sounds, S1 normal and S2 normal. No murmur heard. No friction rub. No gallop. Pulmonary:      Effort: Pulmonary effort is normal. No respiratory distress. Breath sounds: Normal breath sounds. No wheezing. Abdominal:      General: Bowel sounds are normal.      Palpations: Abdomen is soft. There is no mass. Tenderness: There is no abdominal tenderness. There is no guarding. Musculoskeletal:         General: Normal range of motion. Skin:     General: Skin is warm and dry. Capillary Refill: Capillary refill takes less than 2 seconds. Neurological:      General: No focal deficit present. Mental Status: He is alert and oriented to person, place, and time. Data Review  Lab Results   Component Value Date    LABA1C 7.6 01/18/2022     Lab Results   Component Value Date     06/22/2020          Assessment/Plan:      1. Type 2 diabetes mellitus with other circulatory complication, without long-term current use of insulin (HCC)  - POCT microalbumin  - POCT glycosylated hemoglobin (Hb A1C)  - metFORMIN (GLUCOPHAGE) 500 MG tablet; Take 1 tablet by mouth 2 times daily (with meals)  Dispense: 180 tablet;  Refill: 1  - Lancets MISC; 1 each by Does not apply route 2 times daily  Dispense: 100 each; Refill: 5  - blood glucose monitor strips; Test 1 time a day & as needed for symptoms of irregular blood glucose. Dispense: 100 strip; Refill: 3    2. Moderately severe recurrent major depression (HCC)  - citalopram (CELEXA) 20 MG tablet; TAKE ONE TABLET BY MOUTH DAILY  Dispense: 90 tablet; Refill: 1  - DULoxetine (CYMBALTA) 60 MG extended release capsule; Take 1 capsule by mouth daily  Dispense: 90 capsule; Refill: 1    3. Ankylosing spondylitis of multiple sites in spine (HCC)  - meloxicam (MOBIC) 15 MG tablet; TAKE ONE TABLET BY MOUTH DAILY  Dispense: 90 tablet; Refill: 1    4. Coronary artery disease involving native coronary artery of native heart without angina pectoris  - metoprolol succinate (TOPROL XL) 50 MG extended release tablet; Take 1 tablet by mouth daily  Dispense: 90 tablet; Refill: 1    5. Mixed hyperlipidemia  Continue atorvastatin  - Lipid, Fasting; Future  - Comprehensive Metabolic Panel; Future    6. ANATOLY (generalized anxiety disorder)  - DULoxetine (CYMBALTA) 60 MG extended release capsule; Take 1 capsule by mouth daily  Dispense: 90 capsule; Refill: 1  - busPIRone (BUSPAR) 10 MG tablet; TAKE ONE TABLET BY MOUTH TWICE A DAY  Dispense: 180 tablet; Refill: 1    7. Encounter for screening for malignant neoplasm of prostate  - PSA screening; Future    8. Benign prostatic hyperplasia with nocturia  Continue Flomax    9. Primary hypertension  Continue current regimen  - Comprehensive Metabolic Panel; Future    10. Chronic silicosis ([address]  Follow-up with pulmonology    11. Moderate persistent asthma without complication  Continue current regimen per pulm    12.  Class 3 severe obesity due to excess calories with serious comorbidity and body mass index (BMI) of 45.0 to 49.9 in adult [facility])  Continue diet and lifestyle management  Provided counseling re: consistent bedtimes, consistent exercise 20-30 minutes per day (goal 150 min/week of moderate intensity exercise like walking) and consistent eating habits that include vegetables, fruit and whole grain foods along with healthy fats and protein  Avoid processed and sugary foods, large amounts of alcoholic drinks         13. Screening, anemia, deficiency, iron  - CBC; Future    14. Screening for thyroid disorder  - TSH with Reflex;  Future           Health Maintenance Due   Topic Date Due    DTaP/Tdap/Td vaccine (1 - Tdap) Never done    Shingles Vaccine (1 of 2) Never done    Annual Wellness Visit (AWV)  Never done    Diabetic retinal exam  01/29/2021    Potassium monitoring  06/16/2021    Creatinine monitoring  06/16/2021    Diabetic microalbuminuria test  09/22/2021    Lipid screen  12/16/2021       Electronically signed by Ursula Hawley MD on 1/18/2022 at 2:30 PM

## 2022-01-23 ASSESSMENT — ENCOUNTER SYMPTOMS
CHOKING: 0
ANAL BLEEDING: 0
COUGH: 0
DIARRHEA: 0
CHEST TIGHTNESS: 0
VOMITING: 0
SHORTNESS OF BREATH: 0
WHEEZING: 0
NAUSEA: 0
BLOOD IN STOOL: 0
ABDOMINAL PAIN: 0
CONSTIPATION: 0

## 2022-01-23 ASSESSMENT — VISUAL ACUITY: OU: 1

## 2022-02-04 DIAGNOSIS — R35.1 BENIGN PROSTATIC HYPERPLASIA WITH NOCTURIA: ICD-10-CM

## 2022-02-04 DIAGNOSIS — N40.1 BENIGN PROSTATIC HYPERPLASIA WITH NOCTURIA: ICD-10-CM

## 2022-02-07 RX ORDER — TAMSULOSIN HYDROCHLORIDE 0.4 MG/1
CAPSULE ORAL
Qty: 30 CAPSULE | Refills: 5 | Status: SHIPPED | OUTPATIENT
Start: 2022-02-07 | End: 2022-05-18 | Stop reason: SDUPTHER

## 2022-02-07 NOTE — TELEPHONE ENCOUNTER
Last visit: 01/18/2022  Last Med refill: 01/17/2022  Does patient have enough medication for 72 hours: No:     Next Visit Date:  Future Appointments   Date Time Provider Apolinar Cabrales   5/18/2022  2:00 PM Carole Alexander  [address] Maintenance   Topic Date Due    DTaP/Tdap/Td vaccine (1 - Tdap) Never done    Shingles Vaccine (1 of 2) Never done    Annual Wellness Visit (AWV)  Never done    Diabetic retinal exam  01/29/2021    Potassium monitoring  06/16/2021    Creatinine monitoring  06/16/2021    Lipid screen  12/16/2021    Hepatitis C screen  01/18/2023 (Originally 1950)    Depression Monitoring  05/10/2022    Diabetic foot exam  09/15/2022    A1C test (Diabetic or Prediabetic)  01/18/2023    Diabetic microalbuminuria test  01/18/2023    Colon cancer screen fecal DNA test (Cologuard)  03/25/2024    Flu vaccine  Completed    Pneumococcal 65+ years Vaccine  Completed    COVID-19 Vaccine  Completed    AAA screen  Completed    Hepatitis A vaccine  Aged Out    Hib vaccine  Aged Out    Meningococcal (ACWY) vaccine  Aged Out       Hemoglobin A1C (%)   Date Value   01/18/2022 7.6   09/15/2021 8.4   05/10/2021 7.6             ( goal A1C is < 7)   No results found for: LABMICR  LDL Cholesterol (mg/dL)   Date Value   12/16/2020 39   12/10/2017 99       (goal LDL is <100)   AST (U/L)   Date Value   01/30/2020 14     ALT (U/L)   Date Value   01/30/2020 24     BUN (mg/dL)   Date Value   01/30/2020 17     BP Readings from Last 3 Encounters:   01/18/22 136/76   09/20/21 132/78   06/10/21 136/70          (goal 120/80)    All Future Testing planned in [facility]  Lab Frequency Next Occurrence   Lipid, Fasting Once 02/18/2022   CBC Once 02/18/2022   Comprehensive Metabolic Panel Once 08/70/7353   PSA screening Once 02/18/2022   TSH with Reflex Once 02/18/2022               Patient Active Problem List:     Ankylosing spondylitis of multiple sites in spine [facility])     Chronic silicosis ([facility] [address]     Moderate persistent asthma without complication     Coronary artery disease involving native coronary artery of native heart without angina pectoris     History of ST elevation myocardial infarction (STEMI)     Class 3 severe obesity due to excess calories with serious comorbidity and body mass index (BMI) of 45.0 to 49.9 in [facility])     Moderately severe recurrent major depression (HCC)     Eczema of face     Benign prostatic hyperplasia with nocturia     Diabetes mellitus ([facility] [address]

## 2022-03-23 ENCOUNTER — HOSPITAL ENCOUNTER (OUTPATIENT)
Age: 72
Setting detail: SPECIMEN
Discharge: HOME | End: 2022-03-23

## 2022-03-23 DIAGNOSIS — Z13.29 SCREENING FOR THYROID DISORDER: ICD-10-CM

## 2022-03-23 DIAGNOSIS — E78.2 MIXED HYPERLIPIDEMIA: ICD-10-CM

## 2022-03-23 DIAGNOSIS — Z13.0 SCREENING, ANEMIA, DEFICIENCY, IRON: ICD-10-CM

## 2022-03-23 DIAGNOSIS — I10 PRIMARY HYPERTENSION: ICD-10-CM

## 2022-03-23 DIAGNOSIS — Z12.5 ENCOUNTER FOR SCREENING FOR MALIGNANT NEOPLASM OF PROSTATE: ICD-10-CM

## 2022-03-23 LAB
ALBUMIN SERPL-MCNC: 4.1 G/DL (ref 3.5–5.2)
ALBUMIN/GLOBULIN RATIO: 1.6 (ref 1–2.5)
ALP BLD-CCNC: 411 U/L (ref 40–129)
ALT SERPL-CCNC: 19 U/L (ref 5–41)
ANION GAP SERPL CALCULATED.3IONS-SCNC: 15 MMOL/L (ref 9–17)
AST SERPL-CCNC: 15 U/L
BILIRUB SERPL-MCNC: 0.33 MG/DL (ref 0.3–1.2)
BUN BLDV-MCNC: 13 MG/DL (ref 8–23)
CALCIUM SERPL-MCNC: 9.2 MG/DL (ref 8.6–10.4)
CHLORIDE BLD-SCNC: 100 MMOL/L (ref 98–107)
CHOLESTEROL, FASTING: 116 MG/DL
CHOLESTEROL/HDL RATIO: 2.6
CO2: 23 MMOL/L (ref 20–31)
CREAT SERPL-MCNC: 0.61 MG/DL (ref 0.7–1.2)
GFR AFRICAN AMERICAN: >60 ML/MIN
GFR NON-AFRICAN AMERICAN: >60 ML/MIN
GFR SERPL CREATININE-BSD FRML MDRD: ABNORMAL ML/MIN/{1.73_M2}
GLUCOSE BLD-MCNC: 165 MG/DL (ref 70–99)
HCT VFR BLD CALC: 37.2 % (ref 40.7–50.3)
HDLC SERPL-MCNC: 45 MG/DL
HEMOGLOBIN: 11.1 G/DL (ref 13–17)
LDL CHOLESTEROL: 59 MG/DL (ref 0–130)
MCH RBC QN AUTO: 22.3 PG (ref 25.2–33.5)
MCHC RBC AUTO-ENTMCNC: 29.8 G/DL (ref 28.4–34.8)
MCV RBC AUTO: 74.7 FL (ref 82.6–102.9)
NRBC AUTOMATED: 0 PER 100 WBC
PDW BLD-RTO: 18.7 % (ref 11.8–14.4)
PLATELET # BLD: 275 K/UL (ref 138–453)
PMV BLD AUTO: 10.5 FL (ref 8.1–13.5)
POTASSIUM SERPL-SCNC: 4.4 MMOL/L (ref 3.7–5.3)
PROSTATE SPECIFIC ANTIGEN: 4.13 UG/L
RBC # BLD: 4.98 M/UL (ref 4.21–5.77)
SODIUM BLD-SCNC: 138 MMOL/L (ref 135–144)
TOTAL PROTEIN: 6.7 G/DL (ref 6.4–8.3)
TRIGLYCERIDE, FASTING: 61 MG/DL
TSH SERPL DL<=0.05 MIU/L-ACNC: 1.13 MIU/L (ref 0.3–5)
WBC # BLD: 7.1 K/UL (ref 3.5–11.3)

## 2022-03-24 LAB
ALT SERPL-CCNC: 20 U/L (ref 5–41)
AST SERPL-CCNC: 17 U/L

## 2022-04-19 ENCOUNTER — TELEPHONE (OUTPATIENT)
Dept: FAMILY MEDICINE CLINIC | Age: 72
End: 2022-04-19

## 2022-04-19 DIAGNOSIS — D64.9 ANEMIA, UNSPECIFIED TYPE: Primary | ICD-10-CM

## 2022-04-19 DIAGNOSIS — R97.20 ELEVATED PSA: ICD-10-CM

## 2022-04-19 NOTE — TELEPHONE ENCOUNTER
pts wife called stating pt has been having vomiting and diarrhea atleast once a month and is starting to get more frequent, states he is having to go almost every hour, just started again last night. Pt has also been having a headache \"often\"  No other symptoms     Pt wife mentioned they don't know if it could be one of his medications.

## 2022-04-20 ENCOUNTER — HOSPITAL ENCOUNTER (OUTPATIENT)
Age: 72
Setting detail: SPECIMEN
Discharge: HOME | End: 2022-04-20

## 2022-04-20 ENCOUNTER — OFFICE VISIT (OUTPATIENT)
Dept: FAMILY MEDICINE CLINIC | Age: 72
End: 2022-04-20
Payer: MEDICARE

## 2022-04-20 VITALS
BODY MASS INDEX: 43.13 KG/M2 | DIASTOLIC BLOOD PRESSURE: 68 MMHG | WEIGHT: 291.2 LBS | TEMPERATURE: 98.1 F | HEART RATE: 70 BPM | OXYGEN SATURATION: 98 % | SYSTOLIC BLOOD PRESSURE: 118 MMHG

## 2022-04-20 DIAGNOSIS — R63.4 UNINTENTIONAL WEIGHT LOSS OF MORE THAN 10 POUNDS IN 90 DAYS: ICD-10-CM

## 2022-04-20 DIAGNOSIS — R19.7 DIARRHEA, UNSPECIFIED TYPE: ICD-10-CM

## 2022-04-20 DIAGNOSIS — D64.9 ANEMIA, UNSPECIFIED TYPE: ICD-10-CM

## 2022-04-20 DIAGNOSIS — R11.2 NON-INTRACTABLE VOMITING WITH NAUSEA, UNSPECIFIED VOMITING TYPE: Primary | ICD-10-CM

## 2022-04-20 DIAGNOSIS — R97.20 ELEVATED PSA: ICD-10-CM

## 2022-04-20 LAB
ABSOLUTE EOS #: 0.41 K/UL (ref 0–0.44)
ABSOLUTE IMMATURE GRANULOCYTE: 0.06 K/UL (ref 0–0.3)
ABSOLUTE LYMPH #: 1.48 K/UL (ref 1.1–3.7)
ABSOLUTE MONO #: 1.41 K/UL (ref 0.1–1.2)
ALBUMIN SERPL-MCNC: 4 G/DL (ref 3.5–5.2)
ALBUMIN/GLOBULIN RATIO: 1.2 (ref 1–2.5)
ALP BLD-CCNC: 369 U/L (ref 40–129)
ALT SERPL-CCNC: 22 U/L (ref 5–41)
ANION GAP SERPL CALCULATED.3IONS-SCNC: 12 MMOL/L (ref 9–17)
AST SERPL-CCNC: 14 U/L
BASOPHILS # BLD: 0 % (ref 0–2)
BASOPHILS ABSOLUTE: 0.04 K/UL (ref 0–0.2)
BILIRUB SERPL-MCNC: 0.44 MG/DL (ref 0.3–1.2)
BUN BLDV-MCNC: 18 MG/DL (ref 8–23)
CALCIUM SERPL-MCNC: 9.4 MG/DL (ref 8.6–10.4)
CHLORIDE BLD-SCNC: 100 MMOL/L (ref 98–107)
CO2: 22 MMOL/L (ref 20–31)
CREAT SERPL-MCNC: 0.72 MG/DL (ref 0.7–1.2)
EOSINOPHILS RELATIVE PERCENT: 3 % (ref 1–4)
FERRITIN: 73 NG/ML (ref 30–400)
FOLATE: 16.1 NG/ML
GFR AFRICAN AMERICAN: >60 ML/MIN
GFR NON-AFRICAN AMERICAN: >60 ML/MIN
GFR SERPL CREATININE-BSD FRML MDRD: ABNORMAL ML/MIN/{1.73_M2}
GLUCOSE BLD-MCNC: 219 MG/DL (ref 70–99)
HCT VFR BLD CALC: 42.1 % (ref 40.7–50.3)
HEMOGLOBIN: 12.7 G/DL (ref 13–17)
IMMATURE GRANULOCYTES: 0 %
IRON SATURATION: 11 % (ref 20–55)
IRON: 40 UG/DL (ref 59–158)
LYMPHOCYTES # BLD: 10 % (ref 24–43)
MCH RBC QN AUTO: 22.4 PG (ref 25.2–33.5)
MCHC RBC AUTO-ENTMCNC: 30.2 G/DL (ref 28.4–34.8)
MCV RBC AUTO: 74.4 FL (ref 82.6–102.9)
MONOCYTES # BLD: 9 % (ref 3–12)
NRBC AUTOMATED: 0 PER 100 WBC
PDW BLD-RTO: 18.6 % (ref 11.8–14.4)
PLATELET # BLD: 335 K/UL (ref 138–453)
PMV BLD AUTO: 10.5 FL (ref 8.1–13.5)
POTASSIUM SERPL-SCNC: 4.1 MMOL/L (ref 3.7–5.3)
PROSTATE SPECIFIC ANTIGEN: 3.97 UG/L
RBC # BLD: 5.66 M/UL (ref 4.21–5.77)
RBC # BLD: ABNORMAL 10*6/UL
SEG NEUTROPHILS: 78 % (ref 36–65)
SEGMENTED NEUTROPHILS ABSOLUTE COUNT: 11.99 K/UL (ref 1.5–8.1)
SODIUM BLD-SCNC: 134 MMOL/L (ref 135–144)
TOTAL IRON BINDING CAPACITY: 378 UG/DL (ref 250–450)
TOTAL PROTEIN: 7.3 G/DL (ref 6.4–8.3)
UNSATURATED IRON BINDING CAPACITY: 338 UG/DL (ref 112–347)
VITAMIN B-12: 688 PG/ML (ref 232–1245)
WBC # BLD: 15.4 K/UL (ref 3.5–11.3)

## 2022-04-20 PROCEDURE — 99213 OFFICE O/P EST LOW 20 MIN: CPT | Performed by: NURSE PRACTITIONER

## 2022-04-20 RX ORDER — TERBINAFINE HYDROCHLORIDE 250 MG/1
TABLET ORAL
COMMUNITY
Start: 2022-04-01

## 2022-04-20 RX ORDER — ONDANSETRON 4 MG/1
4 TABLET, FILM COATED ORAL DAILY PRN
Qty: 30 TABLET | Refills: 1 | Status: SHIPPED | OUTPATIENT
Start: 2022-04-20

## 2022-04-20 ASSESSMENT — ENCOUNTER SYMPTOMS
BACK PAIN: 0
EYE PAIN: 0
SINUS PRESSURE: 0
NAUSEA: 1
DIARRHEA: 1
SINUS PAIN: 0
ABDOMINAL PAIN: 0
COLOR CHANGE: 0
VOMITING: 1
ABDOMINAL DISTENTION: 1
CONSTIPATION: 0
CHEST TIGHTNESS: 0
SHORTNESS OF BREATH: 0

## 2022-04-20 NOTE — PROGRESS NOTES
Pt started vomiting and diarrhea on 04/18/2022 lasted until last night, pt states he is still gassy, been hoping for almost a month, but more frequent for the lat 2 weeks.  Pt states he was wondering if it could be some of his medications

## 2022-04-20 NOTE — PROGRESS NOTES
Yanna Salomon (:  1950) is a 70 y.o. male,Established patient, here for evaluation of the following chief complaint(s):  Diarrhea and Emesis         ASSESSMENT/PLAN:  1. Non-intractable vomiting with nausea, unspecified vomiting type  Comments:  Should symptoms become sudden and severe, please seek emergent care. Follow up pending results. Orders:  -     Comprehensive Metabolic Panel; Future  -     ondansetron (ZOFRAN) 4 MG tablet; Take 1 tablet by mouth daily as needed for Nausea or Vomiting, Disp-30 tablet, R-1Normal  -     CT ABDOMEN PELVIS W IV CONTRAST; Future  -     Urban Dexter MD, Gastroenterology, [facility]  2. Diarrhea, unspecified type  -     Comprehensive Metabolic Panel; Future  -     CT ABDOMEN PELVIS W IV CONTRAST; Future  -     Urban Dexter MD, Gastroenterology, [facility]  3. Unintentional weight loss of more than 10 pounds in 90 days  -     CT ABDOMEN PELVIS W IV CONTRAST; Future  -     Urban Dexter MD, Gastroenterology, [facility]      Return for As scheduled 22.          Subjective   SUBJECTIVE/OBJECTIVE:  Presents with wife for acute visit  Has lost 15lbs since last OV (3 months ago) without trying     Nausea vomiting x24 hours, will be in bed this whole time   Unable to keep much food down, no desire to even eat  Most recent episode was 2 days ago  Will drink pedialyte, water just to stay hydrated   This has happened 3 times over the period of 3 months  The last two within a two week period   No abdominal pain, does feel bloated during episodes   Stools are normal, no dark/melena stool   Has incredible amounts of gas during these periods as well - mostly belching, not much flatulence   No fever, chills,just feels run down 'utterly exhausted, something is wrong'  Has not been around any sick contacts, no one else in the house has experienced this before  Wife concerned symptoms may be as a result of metformin - has been on same dose for several years    Did not realize he had outstanding labs - he will complete today          Review of Systems   Constitutional: Positive for activity change, fatigue and unexpected weight change. Negative for chills. HENT: Negative for hearing loss, postnasal drip, sinus pressure and sinus pain. Eyes: Negative for pain and visual disturbance. Respiratory: Negative for chest tightness and shortness of breath. Cardiovascular: Negative for chest pain and palpitations. Gastrointestinal: Positive for abdominal distention, diarrhea, nausea and vomiting. Negative for abdominal pain and constipation. Endocrine: Negative for polydipsia, polyphagia and polyuria. Genitourinary: Negative for dysuria, flank pain, frequency and urgency. Musculoskeletal: Negative for arthralgias, back pain and myalgias. Skin: Negative for color change and rash. Neurological: Negative for dizziness, weakness, light-headedness, numbness and headaches. Psychiatric/Behavioral: Negative for confusion, hallucinations, self-injury, sleep disturbance and suicidal ideas. The patient is not nervous/anxious. Objective   Physical Exam  Vitals and nursing note reviewed. Constitutional:       Appearance: Normal appearance. He is obese. HENT:      Head: Normocephalic. Right Ear: Tympanic membrane, ear canal and external ear normal.      Left Ear: Tympanic membrane, ear canal and external ear normal.      Nose: Nose normal.      Mouth/Throat:      Mouth: Mucous membranes are moist.      Pharynx: Oropharynx is clear. Eyes:      Extraocular Movements: Extraocular movements intact. Conjunctiva/sclera: Conjunctivae normal.      Pupils: Pupils are equal, round, and reactive to light. Cardiovascular:      Rate and Rhythm: Normal rate and regular rhythm. Pulses: Normal pulses. Heart sounds: Normal heart sounds, S1 normal and S2 normal.   Pulmonary:      Effort: Pulmonary effort is normal.      Breath sounds: Normal breath sounds. Abdominal:      General: Abdomen is flat. Bowel sounds are normal.      Palpations: Abdomen is soft. Musculoskeletal:         General: Normal range of motion. Cervical back: Normal range of motion. Skin:     General: Skin is warm and dry. Capillary Refill: Capillary refill takes less than 2 seconds. Neurological:      General: No focal deficit present. Mental Status: He is alert and oriented to person, place, and time. Mental status is at baseline. Psychiatric:         Mood and Affect: Mood normal.         Behavior: Behavior normal.         Thought Content: Thought content normal.         Judgment: Judgment normal.             Wt Readings from Last 3 Encounters:   04/20/22 291 lb 3.2 oz (132.1 kg)   01/18/22 (!) 306 lb 9.6 oz (139.1 kg)   09/20/21 (!) 302 lb 9.6 oz (137.3 kg)     Temp Readings from Last 3 Encounters:   04/20/22 98.1 °F (36.7 °C)   01/18/22 98.6 °F (37 °C) (Temporal)   09/20/21 97.8 °F (36.6 °C) (Temporal)     BP Readings from Last 3 Encounters:   04/20/22 118/68   01/18/22 136/76   09/20/21 132/78     Pulse Readings from Last 3 Encounters:   04/20/22 70   01/18/22 68   09/20/21 72         An electronic signature was used to authenticate this note.     --JULIA Valdes NP

## 2022-04-26 ENCOUNTER — HOSPITAL ENCOUNTER (OUTPATIENT)
Dept: CT IMAGING | Age: 72
Discharge: HOME | End: 2022-04-28
Payer: MEDICARE

## 2022-04-26 DIAGNOSIS — R19.7 DIARRHEA, UNSPECIFIED TYPE: ICD-10-CM

## 2022-04-26 DIAGNOSIS — R63.4 UNINTENTIONAL WEIGHT LOSS OF MORE THAN 10 POUNDS IN 90 DAYS: ICD-10-CM

## 2022-04-26 DIAGNOSIS — R11.2 NON-INTRACTABLE VOMITING WITH NAUSEA, UNSPECIFIED VOMITING TYPE: ICD-10-CM

## 2022-04-26 PROCEDURE — 2580000003 HC RX 258: Performed by: NURSE PRACTITIONER

## 2022-04-26 PROCEDURE — 6360000004 HC RX CONTRAST MEDICATION: Performed by: NURSE PRACTITIONER

## 2022-04-26 PROCEDURE — 74177 CT ABD & PELVIS W/CONTRAST: CPT

## 2022-04-26 RX ORDER — SODIUM CHLORIDE 0.9 % (FLUSH) 0.9 %
10 SYRINGE (ML) INJECTION PRN
Status: DISCONTINUED | OUTPATIENT
Start: 2022-04-26 | End: 2022-04-29 | Stop reason: HOSPADM

## 2022-04-26 RX ORDER — 0.9 % SODIUM CHLORIDE 0.9 %
80 INTRAVENOUS SOLUTION INTRAVENOUS ONCE
Status: COMPLETED | OUTPATIENT
Start: 2022-04-26 | End: 2022-04-26

## 2022-04-26 RX ADMIN — IOPAMIDOL 75 ML: 755 INJECTION, SOLUTION INTRAVENOUS at 12:22

## 2022-04-26 RX ADMIN — IOHEXOL 50 ML: 240 INJECTION, SOLUTION INTRATHECAL; INTRAVASCULAR; INTRAVENOUS; ORAL at 12:22

## 2022-04-26 RX ADMIN — SODIUM CHLORIDE, PRESERVATIVE FREE 10 ML: 5 INJECTION INTRAVENOUS at 12:22

## 2022-04-26 RX ADMIN — SODIUM CHLORIDE 80 ML: 9 INJECTION, SOLUTION INTRAVENOUS at 12:22

## 2022-05-18 ENCOUNTER — OFFICE VISIT (OUTPATIENT)
Dept: FAMILY MEDICINE CLINIC | Age: 72
End: 2022-05-18
Payer: MEDICARE

## 2022-05-18 VITALS
TEMPERATURE: 99 F | BODY MASS INDEX: 44.13 KG/M2 | WEIGHT: 298 LBS | SYSTOLIC BLOOD PRESSURE: 122 MMHG | DIASTOLIC BLOOD PRESSURE: 76 MMHG | HEART RATE: 60 BPM | OXYGEN SATURATION: 97 %

## 2022-05-18 DIAGNOSIS — N40.1 BENIGN PROSTATIC HYPERPLASIA WITH NOCTURIA: ICD-10-CM

## 2022-05-18 DIAGNOSIS — E11.59 TYPE 2 DIABETES MELLITUS WITH OTHER CIRCULATORY COMPLICATION, WITHOUT LONG-TERM CURRENT USE OF INSULIN (HCC): ICD-10-CM

## 2022-05-18 DIAGNOSIS — E78.2 MIXED HYPERLIPIDEMIA: ICD-10-CM

## 2022-05-18 DIAGNOSIS — I25.10 CORONARY ARTERY DISEASE INVOLVING NATIVE CORONARY ARTERY OF NATIVE HEART WITHOUT ANGINA PECTORIS: ICD-10-CM

## 2022-05-18 DIAGNOSIS — F33.2 MODERATELY SEVERE RECURRENT MAJOR DEPRESSION (HCC): ICD-10-CM

## 2022-05-18 DIAGNOSIS — M45.0 ANKYLOSING SPONDYLITIS OF MULTIPLE SITES IN SPINE (HCC): ICD-10-CM

## 2022-05-18 DIAGNOSIS — I10 ESSENTIAL HYPERTENSION: ICD-10-CM

## 2022-05-18 DIAGNOSIS — R35.1 BENIGN PROSTATIC HYPERPLASIA WITH NOCTURIA: ICD-10-CM

## 2022-05-18 DIAGNOSIS — I25.2 HISTORY OF ST ELEVATION MYOCARDIAL INFARCTION (STEMI): Primary | ICD-10-CM

## 2022-05-18 DIAGNOSIS — F41.1 GAD (GENERALIZED ANXIETY DISORDER): ICD-10-CM

## 2022-05-18 LAB — HBA1C MFR BLD: 8.1 %

## 2022-05-18 PROCEDURE — 3052F HG A1C>EQUAL 8.0%<EQUAL 9.0%: CPT | Performed by: INTERNAL MEDICINE

## 2022-05-18 PROCEDURE — 99214 OFFICE O/P EST MOD 30 MIN: CPT | Performed by: INTERNAL MEDICINE

## 2022-05-18 PROCEDURE — 83036 HEMOGLOBIN GLYCOSYLATED A1C: CPT | Performed by: INTERNAL MEDICINE

## 2022-05-18 RX ORDER — METOPROLOL SUCCINATE 50 MG/1
50 TABLET, EXTENDED RELEASE ORAL DAILY
Qty: 90 TABLET | Refills: 1 | Status: SHIPPED | OUTPATIENT
Start: 2022-05-18

## 2022-05-18 RX ORDER — LISINOPRIL 10 MG/1
10 TABLET ORAL DAILY
Qty: 90 TABLET | Refills: 1 | Status: SHIPPED | OUTPATIENT
Start: 2022-05-18

## 2022-05-18 RX ORDER — MELOXICAM 15 MG/1
TABLET ORAL
Qty: 90 TABLET | Refills: 1 | Status: SHIPPED | OUTPATIENT
Start: 2022-05-18

## 2022-05-18 RX ORDER — DULOXETIN HYDROCHLORIDE 60 MG/1
120 CAPSULE, DELAYED RELEASE ORAL DAILY
Qty: 180 CAPSULE | Refills: 1 | Status: SHIPPED | OUTPATIENT
Start: 2022-05-18

## 2022-05-18 RX ORDER — BUSPIRONE HYDROCHLORIDE 10 MG/1
TABLET ORAL
Qty: 180 TABLET | Refills: 1 | Status: SHIPPED | OUTPATIENT
Start: 2022-05-18

## 2022-05-18 RX ORDER — CITALOPRAM 20 MG/1
TABLET ORAL
Qty: 90 TABLET | Refills: 1 | Status: CANCELLED | OUTPATIENT
Start: 2022-05-18

## 2022-05-18 RX ORDER — METFORMIN HYDROCHLORIDE 500 MG/1
500 TABLET, EXTENDED RELEASE ORAL
Qty: 90 TABLET | Refills: 1 | Status: SHIPPED | OUTPATIENT
Start: 2022-05-18 | End: 2022-09-26

## 2022-05-18 RX ORDER — TAMSULOSIN HYDROCHLORIDE 0.4 MG/1
CAPSULE ORAL
Qty: 30 CAPSULE | Refills: 5 | Status: SHIPPED | OUTPATIENT
Start: 2022-05-18

## 2022-05-18 ASSESSMENT — PATIENT HEALTH QUESTIONNAIRE - PHQ9
6. FEELING BAD ABOUT YOURSELF - OR THAT YOU ARE A FAILURE OR HAVE LET YOURSELF OR YOUR FAMILY DOWN: 0
SUM OF ALL RESPONSES TO PHQ QUESTIONS 1-9: 1
2. FEELING DOWN, DEPRESSED OR HOPELESS: 0
5. POOR APPETITE OR OVEREATING: 0
SUM OF ALL RESPONSES TO PHQ9 QUESTIONS 1 & 2: 0
10. IF YOU CHECKED OFF ANY PROBLEMS, HOW DIFFICULT HAVE THESE PROBLEMS MADE IT FOR YOU TO DO YOUR WORK, TAKE CARE OF THINGS AT HOME, OR GET ALONG WITH OTHER PEOPLE: 0
8. MOVING OR SPEAKING SO SLOWLY THAT OTHER PEOPLE COULD HAVE NOTICED. OR THE OPPOSITE, BEING SO FIGETY OR RESTLESS THAT YOU HAVE BEEN MOVING AROUND A LOT MORE THAN USUAL: 0
4. FEELING TIRED OR HAVING LITTLE ENERGY: 1
9. THOUGHTS THAT YOU WOULD BE BETTER OFF DEAD, OR OF HURTING YOURSELF: 0
SUM OF ALL RESPONSES TO PHQ QUESTIONS 1-9: 1
1. LITTLE INTEREST OR PLEASURE IN DOING THINGS: 0
7. TROUBLE CONCENTRATING ON THINGS, SUCH AS READING THE NEWSPAPER OR WATCHING TELEVISION: 0
3. TROUBLE FALLING OR STAYING ASLEEP: 0

## 2022-05-18 NOTE — PATIENT INSTRUCTIONS
Restart the metformin for your blood sugar - I changed that to an extended release 500mg pill once daily   Stop Celexa (citalopram) - increase duloxetine to 120mg daily i.e.- two of the 60mg pills daily

## 2022-05-18 NOTE — PROGRESS NOTES
Subjective:       Patient ID:     Angie Jarrett is a 70 y.o. male who presents for   Chief Complaint   Patient presents with    Diabetes       HPI:  Nursing note reviewed and discussed with patient. Nausea and vomiting have resolved completely since last visit and not reoccurred. He is eating and drinking normally now. Constipated a little - + straining, formed stools, no blood or pain. Has not tried anything for the constipation yet. Depression - doing well on current regimen. Denies anhedonia, nervousness, sleep difficulty, racing thoughts, auditory or visual hallucination, thoughts of self harm, suicidal or homicidal ideation. Diabetes - doing well with current regimen. Denies hypoglycemia, hyperglycemia, fatigue, polyuria, polydipsia, paresthesias, vision changes, dizziness. Eye exam was done last year. Not following with podiatry. Patient is aking ACE inhibitor/ARB. Complications of diabetes include HLD. Hypertension-tolerating current regimen without chest pain, palpitations, dizziness, peripheral edema, dyspnea on exertion, orthopnea, paroxysmal nocturnal dyspnea. Hyperlipidemia-tolerating current regimen without myalgias, dyspepsia, jaundice. Mostly compliant with diet recommendations for low carb diet, not very compliant with exercise recommendations. Cardiovascular risk factors: advanced age (older than 54 for men, 72 for women), diabetes mellitus, dyslipidemia, hypertension, male gender, obesity (BMI >= 30 kg/m2) and sedentary lifestyle          Patient's medications, allergies, past medical, surgical, social and family histories were reviewed and updated as appropriate.     Past Medical History:   Diagnosis Date    [facility] ([facility]     Arthritis     Asthma not dependent on systemic steroids without complication 17/35/4381    CAD (coronary artery disease)     Hypertension     DEB treated with BiPAP 12/07/2017    Pulmonary hypertension ([facility] 19/03/6191    Silicosis [facility]) 2016    STEMI (ST elevation myocardial infarction) ([facility] 2017     Past Surgical History:   Procedure Laterality Date    CORONARY ANGIOPLASTY WITH STENT PLACEMENT  2017    JOINT REPLACEMENT      right hip       Social History     Tobacco Use    Smoking status: Former Smoker     Packs/day: 0.25     Years: 1.00     Pack years: 0.25     Types: Cigarettes     Quit date: 1975     Years since quittin.4    Smokeless tobacco: Never Used   Substance Use Topics    Alcohol use: Yes     Comment: meryl social drinker      Patient Active Problem List   Diagnosis    Ankylosing spondylitis of multiple sites in spine ([facility]    Chronic silicosis ([facility]    Moderate persistent asthma without complication    Coronary artery disease involving native coronary artery of native heart without angina pectoris    History of ST elevation myocardial infarction (STEMI)    Class 3 severe obesity due to excess calories with serious comorbidity and body mass index (BMI) of 45.0 to 49.9 in adult ([facility]    Moderately severe recurrent major depression ([facility]    Eczema of face    Benign prostatic hyperplasia with nocturia    Diabetes mellitus ([facility]         Prior to Visit Medications    Medication Sig Taking?  Authorizing Provider   terbinafine (LAMISIL) 250 MG tablet  Yes Historical Provider, MD   ondansetron (ZOFRAN) 4 MG tablet Take 1 tablet by mouth daily as needed for Nausea or Vomiting Yes JULIA Meraz NP   tamsulosin (FLOMAX) 0.4 MG capsule TAKE ONE CAPSULE BY MOUTH DAILY Yes Carole Price MD   citalopram (CELEXA) 20 MG tablet TAKE ONE TABLET BY MOUTH DAILY Yes Carole Price MD   DULoxetine (CYMBALTA) 60 MG extended release capsule Take 1 capsule by mouth daily Yes Carole Price MD   meloxicam (MOBIC) 15 MG tablet TAKE ONE TABLET BY MOUTH DAILY Yes Carole Price MD   metoprolol succinate (TOPROL XL) 50 MG extended release tablet Take 1 tablet by mouth daily Yes Carole Price MD   busPIRone (BUSPAR) 10 MG tablet TAKE ONE TABLET BY MOUTH TWICE A DAY Yes Carole Sepulveda MD   Lancets MISC 1 each by Does not apply route 2 times daily Yes Mya Mendez MD   blood glucose monitor strips Test 1 time a day & as needed for symptoms of irregular blood glucose. Yes Carole Sepulveda MD   nitroGLYCERIN (NITROSTAT) 0.4 MG SL tablet Place 1 tablet under the tongue every 5 minutes as needed for Chest pain up to max of 3 total doses. If no relief after 1 dose, call 911. Yes Carole Sepulveda MD   BRILINTA 60 MG TABS tablet Take 1 tablet by mouth daily Yes JULIA Flanagan - CNP   Loratadine (CLARITIN PO) Take 10 mg by mouth daily Yes Historical Provider, MD   Blood Glucose Monitoring Suppl (BLOOD GLUCOSE SYSTEM MICHAELA) KIT Test once a day Yes Carole Sepulveda MD   amLODIPine (NORVASC) 2.5 MG tablet TAKE 1 Tablet  BY MOUTH EVERY DAY Yes Tawanna Garcia MD   lisinopril (PRINIVIL;ZESTRIL) 10 MG tablet Take 1 tablet by mouth daily Yes Jessica Kumar MD   atorvastatin (LIPITOR) 80 MG tablet Take 1 tablet by mouth nightly Yes Jessica Kumar MD   aspirin 81 MG chewable tablet Take 1 tablet by mouth daily Yes Jessica Kumar MD   montelukast (SINGULAIR) 10 MG tablet Take 10 mg by mouth nightly Yes Historical Provider, MD   albuterol (PROVENTIL) (2.5 MG/3ML) 0.083% nebulizer solution Take 2.5 mg by nebulization 4 times daily Yes Historical Provider, MD   Fluticasone Furoate-Vilanterol (BREO ELLIPTA) 200-25 MCG/INH AEPB Inhale 200 mcg into the lungs daily Yes Historical Provider, MD   metFORMIN (GLUCOPHAGE) 500 MG tablet Take 1 tablet by mouth 2 times daily (with meals)  Patient not taking: Reported on 5/18/2022  Carole Sepulveda MD   ketoconazole (NIZORAL) 2 % cream Apply topically daily. Patient not taking: Reported on 9/15/2021  Carole Sepulveda MD     Review of Systems  Review of Systems   Constitutional: Negative for fatigue, fever and unexpected weight change.    Respiratory: Negative for cough, choking, chest tightness, shortness of breath and wheezing. Cardiovascular: Negative for chest pain, palpitations and leg swelling. Gastrointestinal: Negative for abdominal pain, anal bleeding, blood in stool, constipation, diarrhea, nausea and vomiting. Endocrine: Negative. Musculoskeletal: Negative for joint swelling and myalgias. Skin: Negative. Neurological: Negative for dizziness. Psychiatric/Behavioral: Negative for sleep disturbance. All other systems reviewed and are negative. Objective:       Physical Exam:  /76   Pulse 60   Temp 99 °F (37.2 °C) (Temporal)   Wt 298 lb (135.2 kg)   SpO2 97%   BMI 44.13 kg/m²   Wt Readings from Last 3 Encounters:   05/18/22 298 lb (135.2 kg)   04/20/22 291 lb 3.2 oz (132.1 kg)   01/18/22 (!) 306 lb 9.6 oz (139.1 kg)         Physical Exam  Vitals and nursing note reviewed. Constitutional:       General: He is not in acute distress. Appearance: He is well-developed. He is not ill-appearing. Eyes:      General: Lids are normal. Vision grossly intact. Cardiovascular:      Rate and Rhythm: Normal rate and regular rhythm. Heart sounds: Normal heart sounds, S1 normal and S2 normal. No murmur heard. No friction rub. No gallop. Pulmonary:      Effort: Pulmonary effort is normal. No respiratory distress. Breath sounds: Normal breath sounds. No wheezing. Abdominal:      General: Bowel sounds are normal.      Palpations: Abdomen is soft. There is no mass. Tenderness: There is no abdominal tenderness. There is no guarding. Musculoskeletal:         General: Normal range of motion. Skin:     General: Skin is warm and dry. Capillary Refill: Capillary refill takes less than 2 seconds. Neurological:      General: No focal deficit present. Mental Status: He is alert and oriented to person, place, and time.          Data Review    Lab Results   Component Value Date    CHOL 165 12/10/2017    TRIG 95 12/10/2017 HDL 45 03/23/2022     a1c today 8.1%     Assessment/Plan:      1. Type 2 diabetes mellitus with other circulatory complication, without long-term current use of insulin (HCC)  - POCT glycosylated hemoglobin (Hb A1C)  - metFORMIN (GLUCOPHAGE-XR) 500 MG extended release tablet; Take 1 tablet by mouth daily (with breakfast)  Dispense: 90 tablet; Refill: 1  - lisinopril (PRINIVIL;ZESTRIL) 10 MG tablet; Take 1 tablet by mouth daily  Dispense: 90 tablet; Refill: 1    2. History of ST elevation myocardial infarction (STEMI)  Continue metoprolol     3. Ankylosing spondylitis of multiple sites in spine ([facility]  - Handicap [facility]; by Does not apply route EXP:  05/18/2027  Dispense: 2 each; Refill: 0  - meloxicam (MOBIC) 15 MG tablet; TAKE ONE TABLET BY MOUTH DAILY  Dispense: 90 tablet; Refill: 1    4. Benign prostatic hyperplasia with nocturia  - tamsulosin (FLOMAX) 0.4 MG capsule; TAKE ONE CAPSULE BY MOUTH DAILY  Dispense: 30 capsule; Refill: 5    5. Moderately severe recurrent major depression (HCC)  - DULoxetine (CYMBALTA) 60 MG extended release capsule; Take 2 capsules by mouth daily  Dispense: 180 capsule; Refill: 1    6. ANATOLY (generalized anxiety disorder)  - DULoxetine (CYMBALTA) 60 MG extended release capsule; Take 2 capsules by mouth daily  Dispense: 180 capsule; Refill: 1  - busPIRone (BUSPAR) 10 MG tablet; TAKE ONE TABLET BY MOUTH TWICE A DAY  Dispense: 180 tablet; Refill: 1    7. Coronary artery disease involving native coronary artery of native heart without angina pectoris  - [facility]; by Does not apply route EXP:  05/18/2027  Dispense: 2 each; Refill: 0  - metoprolol succinate (TOPROL XL) 50 MG extended release tablet; Take 1 tablet by mouth daily  Dispense: 90 tablet; Refill: 1    8. Mixed hyperlipidemia  Continue atorvastatin     9. Essential hypertension  Continue metoprolol, amlodipine   - lisinopril (PRINIVIL;ZESTRIL) 10 MG tablet; Take 1 tablet by mouth daily  Dispense: 90 tablet;  Refill: 1           Health Maintenance Due   Topic Date Due    Annual Wellness Visit (AWV)  Never done    DTaP/Tdap/Td vaccine (1 - Tdap) Never done    Shingles vaccine (1 of 2) Never done    Diabetic retinal exam  01/29/2021       Electronically signed by Vilma Alfonso MD on 5/18/2022 at 2:25 PM

## 2022-05-23 ASSESSMENT — ENCOUNTER SYMPTOMS
WHEEZING: 0
SHORTNESS OF BREATH: 0
CHEST TIGHTNESS: 0
COUGH: 0
VOMITING: 0
NAUSEA: 0
CONSTIPATION: 0
ANAL BLEEDING: 0
BLOOD IN STOOL: 0
ABDOMINAL PAIN: 0
DIARRHEA: 0
CHOKING: 0

## 2022-05-23 ASSESSMENT — VISUAL ACUITY: OU: 1

## 2022-07-16 NOTE — LETTER
[facility]  [address]  Phone: [phone]         July 18, 2020    To: MONSERRAT Johns MD    From: Teri Cruz RD, LD    Patient: Tin Hong   YOB: 1950   Date of Visit: 7/16/20      An initial assessment to determine diabetes education needs was completed on 7/16/20 via doxy. me with patient and wife. Education plan includes:interpretation of lab results, blood sugar goals, complications of diabetes mellitus, hypoglycemia prevention and treatment, exercise, illness management, self-monitoring of blood glucose skills, nutrition and carbohydrate counting. An ongoing plan was created to include: follow up with RN and RD individually    Thank you for the opportunity to provide Diabetes Self Management Education to your patient. no

## 2022-09-26 ENCOUNTER — OFFICE VISIT (OUTPATIENT)
Dept: FAMILY MEDICINE CLINIC | Age: 72
End: 2022-09-26
Payer: MEDICARE

## 2022-09-26 VITALS
SYSTOLIC BLOOD PRESSURE: 124 MMHG | HEART RATE: 75 BPM | DIASTOLIC BLOOD PRESSURE: 68 MMHG | WEIGHT: 296.4 LBS | BODY MASS INDEX: 43.9 KG/M2 | OXYGEN SATURATION: 95 % | HEIGHT: 69 IN | TEMPERATURE: 98.3 F

## 2022-09-26 DIAGNOSIS — Z23 NEED FOR PROPHYLACTIC VACCINATION AND INOCULATION AGAINST VARICELLA: ICD-10-CM

## 2022-09-26 DIAGNOSIS — E66.01 CLASS 3 SEVERE OBESITY DUE TO EXCESS CALORIES WITH SERIOUS COMORBIDITY AND BODY MASS INDEX (BMI) OF 45.0 TO 49.9 IN ADULT (HCC): ICD-10-CM

## 2022-09-26 DIAGNOSIS — I25.2 HISTORY OF ST ELEVATION MYOCARDIAL INFARCTION (STEMI): ICD-10-CM

## 2022-09-26 DIAGNOSIS — Z00.00 INITIAL MEDICARE ANNUAL WELLNESS VISIT: Primary | ICD-10-CM

## 2022-09-26 DIAGNOSIS — I25.10 CORONARY ARTERY DISEASE INVOLVING NATIVE CORONARY ARTERY OF NATIVE HEART WITHOUT ANGINA PECTORIS: ICD-10-CM

## 2022-09-26 DIAGNOSIS — E11.59 TYPE 2 DIABETES MELLITUS WITH OTHER CIRCULATORY COMPLICATION, WITHOUT LONG-TERM CURRENT USE OF INSULIN (HCC): ICD-10-CM

## 2022-09-26 DIAGNOSIS — Z23 NEED FOR SHINGLES VACCINE: ICD-10-CM

## 2022-09-26 DIAGNOSIS — Z23 NEED FOR INFLUENZA VACCINATION: ICD-10-CM

## 2022-09-26 DIAGNOSIS — Z23 NEED FOR DIPHTHERIA-TETANUS-PERTUSSIS (TDAP) VACCINE: ICD-10-CM

## 2022-09-26 PROBLEM — I50.22 CHRONIC SYSTOLIC (CONGESTIVE) HEART FAILURE (HCC): Status: ACTIVE | Noted: 2022-09-26

## 2022-09-26 PROBLEM — I50.22 CHRONIC SYSTOLIC (CONGESTIVE) HEART FAILURE (HCC): Status: RESOLVED | Noted: 2022-09-26 | Resolved: 2022-09-26

## 2022-09-26 LAB — HBA1C MFR BLD: 9 %

## 2022-09-26 PROCEDURE — G0438 PPPS, INITIAL VISIT: HCPCS | Performed by: INTERNAL MEDICINE

## 2022-09-26 PROCEDURE — 83036 HEMOGLOBIN GLYCOSYLATED A1C: CPT | Performed by: INTERNAL MEDICINE

## 2022-09-26 PROCEDURE — 90694 VACC AIIV4 NO PRSRV 0.5ML IM: CPT | Performed by: INTERNAL MEDICINE

## 2022-09-26 PROCEDURE — 90471 IMMUNIZATION ADMIN: CPT | Performed by: INTERNAL MEDICINE

## 2022-09-26 PROCEDURE — G0447 BEHAVIOR COUNSEL OBESITY 15M: HCPCS | Performed by: INTERNAL MEDICINE

## 2022-09-26 PROCEDURE — G0008 ADMIN INFLUENZA VIRUS VAC: HCPCS | Performed by: INTERNAL MEDICINE

## 2022-09-26 PROCEDURE — 90715 TDAP VACCINE 7 YRS/> IM: CPT | Performed by: INTERNAL MEDICINE

## 2022-09-26 PROCEDURE — 1123F ACP DISCUSS/DSCN MKR DOCD: CPT | Performed by: INTERNAL MEDICINE

## 2022-09-26 PROCEDURE — 3046F HEMOGLOBIN A1C LEVEL >9.0%: CPT | Performed by: INTERNAL MEDICINE

## 2022-09-26 RX ORDER — CITALOPRAM 20 MG/1
TABLET ORAL
COMMUNITY
Start: 2022-06-27

## 2022-09-26 RX ORDER — ZOSTER VACCINE RECOMBINANT, ADJUVANTED 50 MCG/0.5
0.5 KIT INTRAMUSCULAR SEE ADMIN INSTRUCTIONS
Qty: 0.5 ML | Refills: 0 | Status: SHIPPED | OUTPATIENT
Start: 2022-09-26 | End: 2022-09-26 | Stop reason: SDUPTHER

## 2022-09-26 RX ORDER — METFORMIN HYDROCHLORIDE 500 MG/1
500 TABLET, EXTENDED RELEASE ORAL 2 TIMES DAILY WITH MEALS
Qty: 180 TABLET | Refills: 1 | Status: SHIPPED | OUTPATIENT
Start: 2022-09-26

## 2022-09-26 ASSESSMENT — PATIENT HEALTH QUESTIONNAIRE - PHQ9
3. TROUBLE FALLING OR STAYING ASLEEP: 0
SUM OF ALL RESPONSES TO PHQ QUESTIONS 1-9: 3
7. TROUBLE CONCENTRATING ON THINGS, SUCH AS READING THE NEWSPAPER OR WATCHING TELEVISION: 0
4. FEELING TIRED OR HAVING LITTLE ENERGY: 1
10. IF YOU CHECKED OFF ANY PROBLEMS, HOW DIFFICULT HAVE THESE PROBLEMS MADE IT FOR YOU TO DO YOUR WORK, TAKE CARE OF THINGS AT HOME, OR GET ALONG WITH OTHER PEOPLE: 0
5. POOR APPETITE OR OVEREATING: 1
SUM OF ALL RESPONSES TO PHQ QUESTIONS 1-9: 3
2. FEELING DOWN, DEPRESSED OR HOPELESS: 1
1. LITTLE INTEREST OR PLEASURE IN DOING THINGS: 0
8. MOVING OR SPEAKING SO SLOWLY THAT OTHER PEOPLE COULD HAVE NOTICED. OR THE OPPOSITE, BEING SO FIGETY OR RESTLESS THAT YOU HAVE BEEN MOVING AROUND A LOT MORE THAN USUAL: 0
SUM OF ALL RESPONSES TO PHQ9 QUESTIONS 1 & 2: 1
9. THOUGHTS THAT YOU WOULD BE BETTER OFF DEAD, OR OF HURTING YOURSELF: 0
6. FEELING BAD ABOUT YOURSELF - OR THAT YOU ARE A FAILURE OR HAVE LET YOURSELF OR YOUR FAMILY DOWN: 0
SUM OF ALL RESPONSES TO PHQ QUESTIONS 1-9: 3
SUM OF ALL RESPONSES TO PHQ QUESTIONS 1-9: 3

## 2022-09-26 ASSESSMENT — LIFESTYLE VARIABLES
HOW MANY STANDARD DRINKS CONTAINING ALCOHOL DO YOU HAVE ON A TYPICAL DAY: 1 OR 2
HOW OFTEN DO YOU HAVE A DRINK CONTAINING ALCOHOL: 2-4 TIMES A MONTH

## 2022-09-26 NOTE — PROGRESS NOTES
dizziness, peripheral edema, dyspnea on exertion, orthopnea, paroxysmal nocturnal dyspnea. Hyperlipidemia-tolerating current regimen without myalgias, dyspepsia, jaundice. Following with cardiology - Dr Shalini Meyer - no changes to medications   Anxiety - doing well on current regimen. Denies anhedonia, nervousness, sleep difficulty, racing thoughts, auditory or visual hallucination, thoughts of self harm, suicidal or homicidal ideation. Asthma/silicosis - following with Dr Jaylon Merritt, remains well controlld with Breo and singulair, not needing albuterol very much - usually prior to exercise or illness     Mostly compliant with diet recommendations for low carb diet, not very compliant with exercise recommendations. Cardiovascular risk factors: advanced age (older than 54 for men, 72 for women), diabetes mellitus, dyslipidemia, hypertension, male gender, obesity (BMI >= 30 kg/m2), and sedentary lifestyle      Patient's complete Health Risk Assessment and screening values have been reviewed and are found in Flowsheets. The following problems were reviewed today and where indicated follow up appointments were made and/or referrals ordered.     Positive Risk Factor Screenings with Interventions:              Health Habits/Nutrition:  Physical Activity: Insufficiently Active    Days of Exercise per Week: 1 day    Minutes of Exercise per Session: 30 min     Have you lost any weight without trying in the past 3 months?: No  Body mass index: (!) 44.08  Have you seen the dentist within the past year?: Yes  Health Habits/Nutrition Interventions:  Inadequate physical activity:  educational materials provided to promote increased physical activity    Hearing/Vision:  Do you or your family notice any trouble with your hearing that hasn't been managed with hearing aids?: (!) Yes  Do you have difficulty driving, watching TV, or doing any of your daily activities because of your eyesight?: (!) Yes (evening)  Have you had an eye exam within the past year?: Yes  No results found. Hearing/Vision Interventions:  Hearing concerns:  patient declines any further evaluation/treatment for hearing issues  Vision concerns:  had eye exam done 9/14/22, will  new glasses this week     ADLs:  In the past 7 days, did you need help from others to perform any of the following everyday activities: Eating, dressing, grooming, bathing, toileting, or walking/balance?: (!) Yes  Select all that apply: (!) Dressing (just putting socks on)  In the past 7 days, did you need help from others to take care of any of the following: Laundry, housekeeping, banking/finances, shopping, telephone use, food preparation, transportation, or taking medications?: No  ADL Interventions:  Patient declines any further evaluation/treatment for this issue          Objective   Vitals:    09/26/22 1432   BP: 124/68   Site: Left Upper Arm   Position: Sitting   Cuff Size: Large Adult   Pulse: 75   Temp: 98.3 °F (36.8 °C)   SpO2: 95%   Weight: 296 lb 6.4 oz (134.4 kg)   Height: 5' 8.75\" (1.746 m)      Body mass index is 44.09 kg/m².    Wt Readings from Last 3 Encounters:   09/26/22 296 lb 6.4 oz (134.4 kg)   05/18/22 298 lb (135.2 kg)   04/20/22 291 lb 3.2 oz (132.1 kg)          General Appearance: alert and oriented to person, place and time, well developed and well- nourished, in no acute distress  Skin: warm and dry, no rash or erythema  Head: normocephalic and atraumatic  Eyes: pupils equal, round, and reactive to light, extraocular eye movements intact, conjunctivae normal  ENT: tympanic membrane, external ear and ear canal normal bilaterally, nose without deformity, nasal mucosa and turbinates normal without polyps  Neck: supple and non-tender without mass, no thyromegaly or thyroid nodules, no cervical lymphadenopathy  Pulmonary/Chest: clear to auscultation bilaterally- no wheezes, rales or rhonchi, normal air movement, no respiratory distress  Cardiovascular: normal rate, regular rhythm, normal S1 and S2, no murmurs, rubs, clicks, or gallops, distal pulses intact, no carotid bruits  Abdomen: soft, non-tender, non-distended, normal bowel sounds, no masses or organomegaly  Extremities: no cyanosis, clubbing or edema  Musculoskeletal: normal range of motion, no joint swelling, deformity or tenderness  Neurologic: reflexes normal and symmetric, no cranial nerve deficit, gait, coordination and speech normal       Allergies   Allergen Reactions    Penicillins      Prior to Visit Medications    Medication Sig Taking? Authorizing Provider   citalopram (CELEXA) 20 MG tablet  Yes Historical Provider, MD   Handicap Placard [facility] by Does not apply route EXP:  05/18/2027 Yes Hossein Matthews MD   tamsulosin (FLOMAX) 0.4 MG capsule TAKE ONE CAPSULE BY MOUTH DAILY Yes Arti Phylliss Dancer, MD   DULoxetine (CYMBALTA) 60 MG extended release capsule Take 2 capsules by mouth daily Yes Arti Phylliss Dancer, MD   meloxicam (MOBIC) 15 MG tablet TAKE ONE TABLET BY MOUTH DAILY Yes Arti Phylliss Dancer, MD   metoprolol succinate (TOPROL XL) 50 MG extended release tablet Take 1 tablet by mouth daily Yes Hossein Matthews MD   busPIRone (BUSPAR) 10 MG tablet TAKE ONE TABLET BY MOUTH TWICE A DAY Yes Arti Phylliss Dancer, MD   metFORMIN (GLUCOPHAGE-XR) 500 MG extended release tablet Take 1 tablet by mouth daily (with breakfast) Yes Arti Phylliss Dancer, MD   lisinopril (PRINIVIL;ZESTRIL) 10 MG tablet Take 1 tablet by mouth daily Yes Arti Phylliss Dancer, MD   terbinafine (LAMISIL) 250 MG tablet  Yes Historical Provider, MD   ondansetron (ZOFRAN) 4 MG tablet Take 1 tablet by mouth daily as needed for Nausea or Vomiting Yes Christopher Oleary APRN - NP   Lancets MISC 1 each by Does not apply route 2 times daily Yes Hossein Matthews MD   blood glucose monitor strips Test 1 time a day & as needed for symptoms of irregular blood glucose.  Yes Arti Phylliss Dancer, MD   nitroGLYCERIN (NITROSTAT) 0.4 MG SL tablet Place 1 tablet under the tongue every 5 minutes as needed for Chest pain up to max of 3 total doses. If no relief after 1 dose, call 911. Yes Carole Perales MD   BRILINTA 60 MG TABS tablet Take 1 tablet by mouth daily Yes JULIA Cheney - CNP   Loratadine (CLARITIN PO) Take 10 mg by mouth daily Yes Historical Provider, MD   Blood Glucose Monitoring Suppl (BLOOD GLUCOSE SYSTEM MICHAELA) KIT Test once a day Yes Carole Perales MD   amLODIPine (NORVASC) 2.5 MG tablet TAKE 1 Tablet  BY MOUTH EVERY DAY Yes Ilsa Staton MD   atorvastatin (LIPITOR) 80 MG tablet Take 1 tablet by mouth nightly Yes Liza Loredo MD   aspirin 81 MG chewable tablet Take 1 tablet by mouth daily Yes Liza Loredo MD   montelukast (SINGULAIR) 10 MG tablet Take 10 mg by mouth nightly Yes Historical Provider, MD   albuterol (PROVENTIL) (2.5 MG/3ML) 0.083% nebulizer solution Take 2.5 mg by nebulization 4 times daily Yes Historical Provider, MD   Fluticasone furoate-vilanterol (BREO ELLIPTA) 200-25 MCG/INH AEPB inhaler Inhale 200 mcg into the lungs daily Yes Historical Provider, MD [place] (Including outside providers/suppliers regularly involved in providing care):   Patient Care Team:  Claudean Oscar, MD as PCP - General (Internal Medicine)  Claudean Oscar, MD as PCP - [facility] Empaneled Provider     Reviewed and updated this visit:  Tobacco  Allergies  Meds  Med Hx  Surg Hx  Soc Hx  Fam Hx             Obesity Counseling: Assessed behavioral health risks and factors affecting choice of behavior. Suggested weight control approaches, including dietary changes behavioral modification and follow up plan. Provided educational and support documentation.   Time spent (minutes): 15

## 2022-09-26 NOTE — PATIENT INSTRUCTIONS
Increase your metformin to 500mg ER twice daily - new prescription  In couple weeks drop off your blood sugar readings at the office so we can adjust your medications since your sugars are continuing to go up   Try CeraVe Healing Ointment, Gold Ingram Advanced Medicated lotion for your dry skin   Try limiting your carbs before noon if possible, and see how your sugars respond to that       Personalized Preventive Plan for [facility] - 9/26/2022  Medicare offers a range of preventive health benefits. Some of the tests and screenings are paid in full while other may be subject to a deductible, co-insurance, and/or copay. Some of these benefits include a comprehensive review of your medical history including lifestyle, illnesses that may run in your family, and various assessments and screenings as appropriate. After reviewing your medical record and screening and assessments performed today your provider may have ordered immunizations, labs, imaging, and/or referrals for you. A list of these orders (if applicable) as well as your Preventive Care list are included within your After Visit Summary for your review. Other Preventive Recommendations:    A preventive eye exam performed by an eye specialist is recommended every 1-2 years to screen for glaucoma; cataracts, macular degeneration, and other eye disorders. A preventive dental visit is recommended every 6 months. Try to get at least 150 minutes of exercise per week or 10,000 steps per day on a pedometer . Order or download the FREE \"Exercise & Physical Activity: Your Everyday Guide\" from [employer]. Call 3-155.862.8601 or search [employer] online. You need 4343-4316 mg of calcium and 1122-3316 IU of vitamin D per day.  It is possible to meet your calcium requirement with diet alone, but a vitamin D supplement is usually necessary to meet this goal.  When exposed to the sun, use a sunscreen that protects against both UVA and UVB radiation with an SPF of 30 or greater. Reapply every 2 to 3 hours or after sweating, drying off with a towel, or swimming. Always wear a seat belt when traveling in a car. Always wear a helmet when riding a bicycle or motorcycle.

## 2022-09-26 NOTE — PROGRESS NOTES
Pt is here today for his regular 4 mo f/u and his AWV     Pt would like an order for his shingles vacc   Pt would like to get his Dtap     Pt has no complaints at this time

## 2022-09-30 NOTE — TELEPHONE ENCOUNTER
LM for patient to call the office to confirm if he is taking this.   Per office note on 5/18/22, Stop Celexa (citalopram) - increase duloxetine to 120mg daily i.e.- two of the 60mg pills daily

## 2022-10-03 RX ORDER — CITALOPRAM 20 MG/1
TABLET ORAL
Qty: 90 TABLET | OUTPATIENT
Start: 2022-10-03

## 2022-10-03 NOTE — TELEPHONE ENCOUNTER
Patient was advised to stop the Celexa but to increase Duloxetine to 2 caps daily.  Patient was prescribed:    DULoxetine (CYMBALTA) 60 MG extended release capsule [[id number]]     Order Details  Dose: 120 mg Route: Oral Frequency: DAILY   Dispense Quantity: 180 capsule Refills: 1          Sig: Take 2 capsules by mouth daily   Back in 05/2022

## 2022-11-05 DIAGNOSIS — E11.59 TYPE 2 DIABETES MELLITUS WITH OTHER CIRCULATORY COMPLICATION, WITHOUT LONG-TERM CURRENT USE OF INSULIN (HCC): ICD-10-CM

## 2022-11-07 DIAGNOSIS — E11.59 TYPE 2 DIABETES MELLITUS WITH OTHER CIRCULATORY COMPLICATION, WITHOUT LONG-TERM CURRENT USE OF INSULIN (HCC): Primary | ICD-10-CM

## 2022-11-07 RX ORDER — LANCETS 33 GAUGE
EACH MISCELLANEOUS
Qty: 100 EACH | Refills: 5 | Status: SHIPPED | OUTPATIENT
Start: 2022-11-07

## 2022-11-07 NOTE — TELEPHONE ENCOUNTER
Last visit: 9/26/22  Last Med refill: 1/18/22  Does patient have enough medication for 72 hours: No:     Next Visit Date:  Future Appointments   Date Time Provider Apolinar Cabrales   1/3/2023  2:00 PM Carole Das  [address] [facility]   Topic Date Due    Shingles vaccine (1 of 2) Never done    Diabetic foot exam  09/15/2022    Hepatitis C screen  01/18/2023 (Originally 6/9/1968)    COVID-19 Vaccine (4 - Booster for Keyshawn Daubs series) 09/26/2023 (Originally 12/24/2021)    A1C test (Diabetic or Prediabetic)  12/26/2022    Diabetic microalbuminuria test  01/18/2023    Lipids  03/23/2023    Diabetic retinal exam  09/14/2023    Depression Monitoring  09/26/2023    Annual Wellness Visit (AWV)  09/27/2023    Colorectal Cancer Screen  03/25/2024    DTaP/Tdap/Td vaccine (2 - Td or Tdap) 09/26/2032    Flu vaccine  Completed    Pneumococcal 65+ years Vaccine  Completed    AAA screen  Completed    Hepatitis A vaccine  Aged Out    Hib vaccine  Aged Out    Meningococcal (ACWY) vaccine  Aged Out       Hemoglobin A1C (%)   Date Value   09/26/2022 9.0   05/18/2022 8.1   01/18/2022 7.6             ( goal A1C is < 7)   No results found for: LABMICR  LDL Cholesterol (mg/dL)   Date Value   03/23/2022 59   12/16/2020 39       (goal LDL is <100)   AST (U/L)   Date Value   04/20/2022 14     ALT (U/L)   Date Value   04/20/2022 22     BUN (mg/dL)   Date Value   04/20/2022 18     BP Readings from Last 3 Encounters:   09/26/22 124/68   05/18/22 122/76   04/20/22 118/68          (goal 120/80)    All Future Testing planned in [facility]  Lab Frequency Next Occurrence   CT CHEST WO CONTRAST Once 06/10/2022               Patient Active Problem List:     Ankylosing spondylitis of multiple sites in spine ([facility] [address]     Chronic silicosis ([facility] [address]     Moderate persistent asthma without complication     Coronary artery disease involving native coronary artery of native heart without angina pectoris     History of ST elevation myocardial infarction (STEMI)     Class 3 severe obesity due to excess calories with serious comorbidity and body mass index (BMI) of 45.0 to 49.9 in adult [facility])     Moderately severe recurrent major depression (HCC)     Eczema of face     Benign prostatic hyperplasia with nocturia     Diabetes mellitus ([facility] [address]

## 2022-11-07 NOTE — TELEPHONE ENCOUNTER
Pharmacy called stating they are needing a new script for lancets. States patient is using [facility] and their script is for onetouch.

## 2022-11-08 RX ORDER — LANCETS 30 GAUGE
1 EACH MISCELLANEOUS 2 TIMES DAILY
Qty: 100 EACH | Refills: 5 | Status: SHIPPED | OUTPATIENT
Start: 2022-11-08

## 2022-11-09 DIAGNOSIS — I10 ESSENTIAL HYPERTENSION: ICD-10-CM

## 2022-11-09 DIAGNOSIS — E11.59 TYPE 2 DIABETES MELLITUS WITH OTHER CIRCULATORY COMPLICATION, WITHOUT LONG-TERM CURRENT USE OF INSULIN (HCC): ICD-10-CM

## 2022-11-09 RX ORDER — LISINOPRIL 10 MG/1
TABLET ORAL
Qty: 90 TABLET | Refills: 1 | Status: SHIPPED | OUTPATIENT
Start: 2022-11-09

## 2022-11-09 RX ORDER — METFORMIN HYDROCHLORIDE 500 MG/1
TABLET, EXTENDED RELEASE ORAL
Qty: 90 TABLET | OUTPATIENT
Start: 2022-11-09

## 2022-11-09 NOTE — TELEPHONE ENCOUNTER
Last visit: 9/26/22  Last Med refill: 5/18/22  Does patient have enough medication for 72 hours: Yes    Next Visit Date:  Future Appointments   Date Time Provider Apolinar Cabrales   1/3/2023  2:00 PM Carole Dove  [address] Maintenance   Topic Date Due    Shingles vaccine (1 of 2) Never done    Diabetic foot exam  09/15/2022    Hepatitis C screen  01/18/2023 (Originally 6/9/1968)    COVID-19 Vaccine (4 - Booster for Critz Hammonds series) 09/26/2023 (Originally 12/24/2021)    A1C test (Diabetic or Prediabetic)  12/26/2022    Diabetic microalbuminuria test  01/18/2023    Lipids  03/23/2023    Diabetic retinal exam  09/14/2023    Depression Monitoring  09/26/2023    Annual Wellness Visit (AWV)  09/27/2023    Colorectal Cancer Screen  03/25/2024    DTaP/Tdap/Td vaccine (2 - Td or Tdap) 09/26/2032    Flu vaccine  Completed    Pneumococcal 65+ years Vaccine  Completed    AAA screen  Completed    Hepatitis A vaccine  Aged Out    Hib vaccine  Aged Out    Meningococcal (ACWY) vaccine  Aged Out       Hemoglobin A1C (%)   Date Value   09/26/2022 9.0   05/18/2022 8.1   01/18/2022 7.6             ( goal A1C is < 7)   No results found for: LABMICR  LDL Cholesterol (mg/dL)   Date Value   03/23/2022 59   12/16/2020 39       (goal LDL is <100)   AST (U/L)   Date Value   04/20/2022 14     ALT (U/L)   Date Value   04/20/2022 22     BUN (mg/dL)   Date Value   04/20/2022 18     BP Readings from Last 3 Encounters:   09/26/22 124/68   05/18/22 122/76   04/20/22 118/68          (goal 120/80)    All Future Testing planned in [facility]  Lab Frequency Next Occurrence   CT CHEST WO CONTRAST Once 06/10/2022               Patient Active Problem List:     Ankylosing spondylitis of multiple sites in spine ([facility] [address]     Chronic silicosis ([facility] [address]     Moderate persistent asthma without complication     Coronary artery disease involving native coronary artery of native heart without angina pectoris     History of ST elevation myocardial infarction (STEMI)     Class 3 severe obesity due to excess calories with serious comorbidity and body mass index (BMI) of 45.0 to 49.9 in adult [facility])     Moderately severe recurrent major depression (HCC)     Eczema of face     Benign prostatic hyperplasia with nocturia     Diabetes mellitus ([facility] [address]

## 2022-11-14 DIAGNOSIS — E11.59 TYPE 2 DIABETES MELLITUS WITH OTHER CIRCULATORY COMPLICATION, WITHOUT LONG-TERM CURRENT USE OF INSULIN (HCC): ICD-10-CM

## 2022-11-14 RX ORDER — METFORMIN HYDROCHLORIDE 500 MG/1
TABLET, EXTENDED RELEASE ORAL
Qty: 90 TABLET | OUTPATIENT
Start: 2022-11-14

## 2022-11-16 DIAGNOSIS — M45.0 ANKYLOSING SPONDYLITIS OF MULTIPLE SITES IN SPINE (HCC): ICD-10-CM

## 2022-11-16 RX ORDER — MELOXICAM 15 MG/1
TABLET ORAL
Qty: 90 TABLET | Refills: 1 | Status: SHIPPED | OUTPATIENT
Start: 2022-11-16

## 2022-11-16 NOTE — TELEPHONE ENCOUNTER
Last visit: 09/26/22  Last Med refill: 11/14/22  Does patient have enough medication for 72 hours: Yes    Next Visit Date:  Future Appointments   Date Time Provider Apolinar Cabrales   1/3/2023  2:00 PM Carole Gonzales  [address] Maintenance   Topic Date Due    Shingles vaccine (1 of 2) Never done    Diabetic foot exam  09/15/2022    Hepatitis C screen  01/18/2023 (Originally 6/9/1968)    COVID-19 Vaccine (4 - Booster for Helayne Drop series) 09/26/2023 (Originally 12/24/2021)    A1C test (Diabetic or Prediabetic)  12/26/2022    Diabetic microalbuminuria test  01/18/2023    Lipids  03/23/2023    Diabetic retinal exam  09/14/2023    Depression Monitoring  09/26/2023    Annual Wellness Visit (AWV)  09/27/2023    Colorectal Cancer Screen  03/25/2024    DTaP/Tdap/Td vaccine (2 - Td or Tdap) 09/26/2032    Flu vaccine  Completed    Pneumococcal 65+ years Vaccine  Completed    AAA screen  Completed    Hepatitis A vaccine  Aged Out    Hib vaccine  Aged Out    Meningococcal (ACWY) vaccine  Aged Out       Hemoglobin A1C (%)   Date Value   09/26/2022 9.0   05/18/2022 8.1   01/18/2022 7.6             ( goal A1C is < 7)   No results found for: LABMICR  LDL Cholesterol (mg/dL)   Date Value   03/23/2022 59   12/16/2020 39       (goal LDL is <100)   AST (U/L)   Date Value   04/20/2022 14     ALT (U/L)   Date Value   04/20/2022 22     BUN (mg/dL)   Date Value   04/20/2022 18     BP Readings from Last 3 Encounters:   09/26/22 124/68   05/18/22 122/76   04/20/22 118/68          (goal 120/80)    All Future Testing planned in [facility]  Lab Frequency Next Occurrence   CT CHEST WO CONTRAST Once 06/10/2022               Patient Active Problem List:     Ankylosing spondylitis of multiple sites in spine ([facility] [address]     Chronic silicosis ([facility] [address]     Moderate persistent asthma without complication     Coronary artery disease involving native coronary artery of native heart without angina pectoris     History of ST elevation myocardial infarction (STEMI)     Class 3 severe obesity due to excess calories with serious comorbidity and body mass index (BMI) of 45.0 to 49.9 in adult [facility])     Moderately severe recurrent major depression (HCC)     Eczema of face     Benign prostatic hyperplasia with nocturia     Diabetes mellitus ([facility] [address]

## 2022-11-19 ENCOUNTER — NURSE TRIAGE (OUTPATIENT)
Dept: OTHER | Age: 72
End: 2022-11-19

## 2022-11-19 NOTE — TELEPHONE ENCOUNTER
Patient is completely out of Metformin 500mg. Patient states his dose was increased from daily to bid and pharmacy will not refill medication due to his request being \"too early for a refill\". Patient needs Metformin. Pharmacy used is [facility] [phone]. Writer made caller aware of no after-hour medication policy, but Ashlee Mcgowan CNP paged due to diabetic medication per triage. Reason for Disposition   [1] Prescription not at pharmacy AND [2] was prescribed by PCP recently  (Exception: triager has access to EMR and prescription is recorded there.  Go to Home Care and confirm for pharmacy.)    Protocols used: Medication Refill and Renewal Call-ADULT-

## 2022-12-09 ENCOUNTER — HOSPITAL ENCOUNTER (OUTPATIENT)
Dept: CT IMAGING | Age: 72
End: 2022-12-09
Payer: MEDICARE

## 2022-12-09 DIAGNOSIS — J62.8 SILICOSIS (HCC): ICD-10-CM

## 2022-12-09 PROCEDURE — 71250 CT THORAX DX C-: CPT

## 2022-12-26 ENCOUNTER — NURSE TRIAGE (OUTPATIENT)
Dept: OTHER | Age: 72
End: 2022-12-26

## 2022-12-26 NOTE — TELEPHONE ENCOUNTER
Patient contacts after hours with concern for shingles. Having moderate back/spine pain radiating around side to abdomen. No rash seen but having itching/burning superficially as well. Adult triage provided with care advice per guidelines. Patient agrees to follow and will call office in am to schedule appointment.   DEYSI JUNG.

## 2022-12-26 NOTE — TELEPHONE ENCOUNTER
Reason for Disposition   [1] Shingles rash already diagnosed AND [2] weak immune system (e.g., HIV positive, cancer chemotherapy, chronic steroid treatment, splenectomy) AND [3] taking antiviral medication    Answer Assessment - Initial Assessment Questions  1. APPEARANCE of RASH: \"Describe the rash. \"       No rash  2. LOCATION: \"Where is the rash located? \"       No rash  3. ONSET: \"When did the rash start? \"       No rash  4. ITCHING: \"Does the rash itch? \" If Yes, ask: \"How bad is the itch? \"  (Scale 1-10; or mild, moderate, severe)      Side, front abdomen is itching and tingling  5. PAIN: \"Does the rash hurt? \" If Yes, ask: \"How bad is the pain? \"  (Scale 0-10; or none, mild, moderate, severe)     - NONE (0): no pain     - MILD (1-3): doesn't interfere with normal activities      - MODERATE (4-7): interferes with normal activities or awakens from sleep      - SEVERE (8-10): excruciating pain, unable to do any normal activities      moderate  6. OTHER SYMPTOMS: \"Do you have any other symptoms? \" (e.g., fever)      No fever   7. PREGNANCY: \"Is there any chance you are pregnant? \" \"When was your last menstrual period? \"      No    Protocols used: [facility]

## 2022-12-27 ENCOUNTER — OFFICE VISIT (OUTPATIENT)
Dept: FAMILY MEDICINE CLINIC | Age: 72
End: 2022-12-27
Payer: MEDICARE

## 2022-12-27 VITALS
BODY MASS INDEX: 43.58 KG/M2 | DIASTOLIC BLOOD PRESSURE: 78 MMHG | OXYGEN SATURATION: 96 % | SYSTOLIC BLOOD PRESSURE: 130 MMHG | HEART RATE: 82 BPM | TEMPERATURE: 97.5 F | WEIGHT: 293 LBS

## 2022-12-27 DIAGNOSIS — I25.10 CORONARY ARTERY DISEASE INVOLVING NATIVE CORONARY ARTERY OF NATIVE HEART WITHOUT ANGINA PECTORIS: ICD-10-CM

## 2022-12-27 DIAGNOSIS — S39.012A STRAIN OF MUSCLE, FASCIA AND TENDON OF LOWER BACK, INITIAL ENCOUNTER: Primary | ICD-10-CM

## 2022-12-27 DIAGNOSIS — E11.59 TYPE 2 DIABETES MELLITUS WITH OTHER CIRCULATORY COMPLICATION, WITHOUT LONG-TERM CURRENT USE OF INSULIN (HCC): ICD-10-CM

## 2022-12-27 DIAGNOSIS — I25.119 ATHEROSCLEROSIS OF NATIVE CORONARY ARTERY OF NATIVE HEART WITH ANGINA PECTORIS (HCC): ICD-10-CM

## 2022-12-27 DIAGNOSIS — I20.9 ANGINA PECTORIS, UNSPECIFIED (HCC): ICD-10-CM

## 2022-12-27 LAB — HBA1C MFR BLD: 8.1 %

## 2022-12-27 PROCEDURE — 83036 HEMOGLOBIN GLYCOSYLATED A1C: CPT | Performed by: INTERNAL MEDICINE

## 2022-12-27 PROCEDURE — 1123F ACP DISCUSS/DSCN MKR DOCD: CPT | Performed by: INTERNAL MEDICINE

## 2022-12-27 PROCEDURE — 3046F HEMOGLOBIN A1C LEVEL >9.0%: CPT | Performed by: INTERNAL MEDICINE

## 2022-12-27 PROCEDURE — 99214 OFFICE O/P EST MOD 30 MIN: CPT | Performed by: INTERNAL MEDICINE

## 2022-12-27 RX ORDER — NITROGLYCERIN 0.4 MG/1
0.4 TABLET SUBLINGUAL EVERY 5 MIN PRN
Qty: 25 TABLET | Refills: 0 | Status: SHIPPED | OUTPATIENT
Start: 2022-12-27

## 2022-12-27 RX ORDER — TIZANIDINE 2 MG/1
2 TABLET ORAL 3 TIMES DAILY PRN
Qty: 30 TABLET | Refills: 0 | Status: SHIPPED | OUTPATIENT
Start: 2022-12-27

## 2022-12-27 RX ORDER — VALACYCLOVIR HYDROCHLORIDE 1 G/1
1000 TABLET, FILM COATED ORAL 3 TIMES DAILY
Qty: 21 TABLET | Refills: 0 | Status: SHIPPED | OUTPATIENT
Start: 2022-12-27 | End: 2023-01-03

## 2022-12-27 ASSESSMENT — ENCOUNTER SYMPTOMS
CHOKING: 0
COUGH: 0
BLOOD IN STOOL: 0
SHORTNESS OF BREATH: 0
ANAL BLEEDING: 0
CHEST TIGHTNESS: 0
CONSTIPATION: 0
ABDOMINAL PAIN: 0
VOMITING: 0
NAUSEA: 0
DIARRHEA: 0
WHEEZING: 0

## 2022-12-27 NOTE — PATIENT INSTRUCTIONS
Patient Education        Low Back Pain: Exercises  Introduction  Here are some examples of exercises for you to try. The exercises may be suggested for a condition or for rehabilitation. Start each exercise slowly. Ease off the exercises if you start to have pain. You will be told when to start these exercises and which ones will work best for you. How to do the exercises  Press-up  Lie on your stomach, supporting your body with your forearms. Press your elbows down into the floor to raise your upper back. As you do this, relax your stomach muscles and allow your back to arch without using your back muscles. As your press up, do not let your hips or pelvis come off the floor. Hold for 15 to 30 seconds, then relax. Repeat 2 to 4 times. Alternate arm and leg (bird dog) exercise  Do this exercise slowly. Try to keep your body straight at all times, and do not let one hip drop lower than the other. Start on the floor, on your hands and knees. Tighten your belly muscles. Raise one leg off the floor, and hold it straight out behind you. Be careful not to let your hip drop down, because that will twist your trunk. Hold for about 6 seconds, then lower your leg and switch to the other leg. Repeat 8 to 12 times on each leg. Over time, work up to holding for 10 to 30 seconds each time. If you feel stable and secure with your leg raised, try raising the opposite arm straight out in front of you at the same time. Knee-to-chest exercise  Lie on your back with your knees bent and your feet flat on the floor. Bring one knee to your chest, keeping the other foot flat on the floor (or keeping the other leg straight, whichever feels better on your lower back). Keep your lower back pressed to the floor. Hold for at least 15 to 30 seconds. Relax, and lower the knee to the starting position. Repeat with the other leg. Repeat 2 to 4 times with each leg.   To get more stretch, put your other leg flat on the floor while pulling your knee to your chest.  Curl-ups  Lie on the floor on your back with your knees bent at a 90-degree angle. Your feet should be flat on the floor, about 12 inches from your buttocks. Cross your arms over your chest. If this bothers your neck, try putting your hands behind your neck (not your head), with your elbows spread apart. Slowly tighten your belly muscles and raise your shoulder blades off the floor. Keep your head in line with your body, and do not press your chin to your chest.  Hold this position for 1 or 2 seconds, then slowly lower yourself back down to the floor. Repeat 8 to 12 times. Pelvic tilt exercise  Lie on your back with your knees bent. \"Brace\" your stomach. This means to tighten your muscles by pulling in and imagining your belly button moving toward your spine. You should feel like your back is pressing to the floor and your hips and pelvis are rocking back. Hold for about 6 seconds while you breathe smoothly. Repeat 8 to 12 times. Heel dig bridging  Lie on your back with both knees bent and your ankles bent so that only your heels are digging into the floor. Your knees should be bent about 90 degrees. Then push your heels into the floor, squeeze your buttocks, and lift your hips off the floor until your shoulders, hips, and knees are all in a straight line. Hold for about 6 seconds as you continue to breathe normally, and then slowly lower your hips back down to the floor and rest for up to 10 seconds. Do 8 to 12 repetitions. Hamstring stretch in doorway  Lie on your back in a doorway, with one leg through the open door. Slide your leg up the wall to straighten your knee. You should feel a gentle stretch down the back of your leg. Hold the stretch for at least 15 to 30 seconds. Do not arch your back, point your toes, or bend either knee. Keep one heel touching the floor and the other heel touching the wall. Repeat with your other leg.   Do 2 to 4 times for each leg.  Hip flexor stretch  Kneel on the floor with one knee bent and one leg behind you. Place your forward knee over your foot. Keep your other knee touching the floor. Slowly push your hips forward until you feel a stretch in the upper thigh of your rear leg. Hold the stretch for at least 15 to 30 seconds. Repeat with your other leg. Do 2 to 4 times on each side. Wall sit  Stand with your back 10 to 12 inches away from a wall. Lean into the wall until your back is flat against it. Slowly slide down until your knees are slightly bent, pressing your lower back into the wall. Hold for about 6 seconds, then slide back up the wall. Repeat 8 to 12 times. Follow-up care is a key part of your treatment and safety. Be sure to make and go to all appointments, and call your doctor if you are having problems. It's also a good idea to know your test results and keep a list of the medicines you take. Current as of: March 9, 2022               Content Version: 13.5  © 2006-2022 [employer]. Care instructions adapted under license by [facility] ([facility]). If you have questions about a medical condition or this instruction, always ask your healthcare professional. [place] [address] any warranty or liability for your use of this information.

## 2022-12-27 NOTE — PROGRESS NOTES
[facility]  [facility] [address]  [address] [facility] [address]  Dept: [phone]  Dept Fax: [phone]      Soila Miller is a 67 y.o. male who presents today for hismedical conditions/complaints as noted below. Soila Miller is c/o of Other (Symptoms of shingles )        Assessment/Plan:     1. Strain of muscle, fascia and tendon of lower back, initial encounter  -     tiZANidine (ZANAFLEX) 2 MG tablet; Take 1 tablet by mouth 3 times daily as needed (back pain), Disp-30 tablet, R-0Normal  -     valACYclovir (VALTREX) 1 g tablet; Take 1 tablet by mouth 3 times daily for 7 days, Disp-21 tablet, R-0Print  2. Coronary artery disease involving native coronary artery of native heart without angina pectoris  -     nitroGLYCERIN (NITROSTAT) 0.4 MG SL tablet; Place 1 tablet under the tongue every 5 minutes as needed for Chest pain up to max of 3 total doses. If no relief after 1 dose, call 911., Disp-25 tablet, R-0Normal  3. Type 2 diabetes mellitus with other circulatory complication, without long-term current use of insulin (HCC)  -     POCT glycosylated hemoglobin (Hb A1C)  4. Angina pectoris, unspecified  -     nitroGLYCERIN (NITROSTAT) 0.4 MG SL tablet; Place 1 tablet under the tongue every 5 minutes as needed for Chest pain up to max of 3 total doses. If no relief after 1 dose, call 911., Disp-25 tablet, R-0Normal  5. Atherosclerosis of native coronary artery of native heart with angina pectoris (HCC)  -     nitroGLYCERIN (NITROSTAT) 0.4 MG SL tablet; Place 1 tablet under the tongue every 5 minutes as needed for Chest pain up to max of 3 total doses. If no relief after 1 dose, call 911., Disp-25 tablet, R-0Normal          No follow-ups on file. HPI     Mid lower back dull pain radiating to the front, constant, ongoing for 10-12 days now. Mild intermittent itching, no rash.  Started in the back then spread to the front, started when he was sitting around, felt like a muscle strain initially. Tylenol has been helping some     Has cut out carbs mostly. Current fasting sugars around 150 from 250 last time. Trying to stay active           BP Readings from Last 3 Encounters:   22 130/78   22 124/68   22 122/76              Past Medical History:   Diagnosis Date    Ankylosing spondylitis ([facility]     Arthritis     Asthma not dependent on systemic steroids without complication     CAD (coronary artery disease)     Hypertension     DEB treated with BiPAP 2017    Pulmonary hypertension ([facility]     Silicosis ([facility]     STEMI (ST elevation myocardial infarction) ([facility] 2017      Past Surgical History:   Procedure Laterality Date    CORONARY ANGIOPLASTY WITH STENT PLACEMENT  2017    JOINT REPLACEMENT      right hip       No family history on file. Social History     Tobacco Use    Smoking status: Former     Packs/day: 0.25     Years: 1.00     Pack years: 0.25     Types: Cigarettes     Quit date: 1975     Years since quittin.0    Smokeless tobacco: Never   Substance Use Topics    Alcohol use: Yes     Comment: meryl social drinker        Allergies   Allergen Reactions    Penicillins      Prior to Visit Medications    Medication Sig Taking?  Authorizing Provider   meloxicam (MOBIC) 15 MG tablet TAKE ONE TABLET BY MOUTH DAILY Yes Carole Byrne MD   lisinopril (PRINIVIL;ZESTRIL) 10 MG tablet TAKE ONE TABLET BY MOUTH DAILY Yes Carole Byrne MD   Lancets MISC 1 each by Does not apply route 2 times daily Yes Terrie Friedman MD   Lancets (Algis Grooms) [address] USE TO TEST TWICE A DAY Yes Terrie Friedman MD   citalopram (CELEXA) 20 MG tablet  Yes Historical Provider, MD   metFORMIN (GLUCOPHAGE-XR) 500 MG extended release tablet Take 1 tablet by mouth 2 times daily (with meals) Yes Terrie Friedman MD   Handicap Placard MISC by Does not apply route EXP:  2027 Yes Terrie Friedman MD   tamsulosin (FLOMAX) 0.4 MG capsule TAKE ONE CAPSULE BY MOUTH DAILY Yes Carole Cronin MD   DULoxetine (CYMBALTA) 60 MG extended release capsule Take 2 capsules by mouth daily Yes Karen Menezes MD   metoprolol succinate (TOPROL XL) 50 MG extended release tablet Take 1 tablet by mouth daily Yes Carole Cronin MD   busPIRone (BUSPAR) 10 MG tablet TAKE ONE TABLET BY MOUTH TWICE A DAY Yes Carole Cronin MD   terbinafine (LAMISIL) 250 MG tablet  Yes Historical Provider, MD   ondansetron (ZOFRAN) 4 MG tablet Take 1 tablet by mouth daily as needed for Nausea or Vomiting Yes JULIA Crawford NP   blood glucose monitor strips Test 1 time a day & as needed for symptoms of irregular blood glucose. Yes Carole Cronin MD   nitroGLYCERIN (NITROSTAT) 0.4 MG SL tablet Place 1 tablet under the tongue every 5 minutes as needed for Chest pain up to max of 3 total doses. If no relief after 1 dose, call 911.  Yes Carole Cronin MD   BRILINTA 60 MG TABS tablet Take 1 tablet by mouth daily Yes JULIA Vital - CNP   Loratadine (CLARITIN PO) Take 10 mg by mouth daily Yes Historical Provider, MD   Blood Glucose Monitoring Suppl (BLOOD GLUCOSE SYSTEM MICHAELA) KIT Test once a day Yes Carole Cronin MD   amLODIPine (NORVASC) 2.5 MG tablet TAKE 1 Tablet  BY MOUTH EVERY DAY Yes Malcom Larson MD   atorvastatin (LIPITOR) 80 MG tablet Take 1 tablet by mouth nightly Yes Diane Harvey MD   aspirin 81 MG chewable tablet Take 1 tablet by mouth daily Yes Diane Harvey MD   montelukast (SINGULAIR) 10 MG tablet Take 10 mg by mouth nightly Yes Historical Provider, MD   albuterol (PROVENTIL) (2.5 MG/3ML) 0.083% nebulizer solution Take 2.5 mg by nebulization 4 times daily Yes Historical Provider, MD   Fluticasone furoate-vilanterol (BREO ELLIPTA) 200-25 MCG/INH AEPB inhaler Inhale 200 mcg into the lungs daily Yes Historical Provider, MD       Review of Systems     Review of Systems   Constitutional:  Negative for fatigue, fever and unexpected weight change. Respiratory:  Negative for cough, choking, chest tightness, shortness of breath and wheezing. Cardiovascular:  Negative for chest pain, palpitations and leg swelling. Gastrointestinal:  Negative for abdominal pain, anal bleeding, blood in stool, constipation, diarrhea, nausea and vomiting. Endocrine: Negative. Musculoskeletal:  Negative for joint swelling and myalgias. Skin: Negative. Neurological:  Negative for dizziness. Psychiatric/Behavioral:  Negative for sleep disturbance. All other systems reviewed and are negative. Objective     /78 (Site: Left Upper Arm, Position: Sitting, Cuff Size: Large Adult)   Pulse 82   Temp 97.5 °F (36.4 °C)   Wt 293 lb (132.9 kg)   SpO2 96%   BMI 43.58 kg/m²   Wt Readings from Last 3 Encounters:   12/27/22 293 lb (132.9 kg)   09/26/22 296 lb 6.4 oz (134.4 kg)   05/18/22 298 lb (135.2 kg)       Physical Exam  Vitals and nursing note reviewed. Constitutional:       Appearance: He is obese. Musculoskeletal:      Lumbar back: Spasms present. No tenderness or bony tenderness. Negative right straight leg raise test and negative left straight leg raise test.   Neurological:      Mental Status: He is alert. Data Review     A1c today 8.1%     Health Maintenance Due   Topic Date Due    GFR test (Diabetes, CKD 3-4, OR last GFR 15-59)  Never done    Shingles vaccine (1 of 2) Never done    Diabetic foot exam  09/15/2022    A1C test (Diabetic or Prediabetic)  12/26/2022    Diabetic Alb to Cr ratio (uACR) test  01/18/2023           Patient given educational materials- see patient instructions. Discussed use, benefit, and side effects of prescribedmedications. All patient questions answered. Pt voiced understanding. Reviewedhealth maintenance. Instructed to continue current medications, diet and exercise. Patient agreed with treatment plan. Follow up as directed.      Electronically signedby Lesly Veronica MD on 12/27/2022

## 2022-12-27 NOTE — PROGRESS NOTES
Pt is here today stating he has symptoms of shingles but no rash     Symptoms are mid to lower back pain, radiates around the LT side to the front, area is very itchy, skin has a tingly feeling, has been using Tylenol, also has been using Onlers, been going on fr about 10 to 12 days, has not seen no rash or blisters     Pt never got his shingles vacc, has order but never got the vacc

## 2023-01-03 ENCOUNTER — OFFICE VISIT (OUTPATIENT)
Dept: FAMILY MEDICINE CLINIC | Age: 73
End: 2023-01-03
Payer: MEDICARE

## 2023-01-03 VITALS
HEART RATE: 85 BPM | WEIGHT: 289 LBS | OXYGEN SATURATION: 95 % | SYSTOLIC BLOOD PRESSURE: 120 MMHG | DIASTOLIC BLOOD PRESSURE: 70 MMHG | TEMPERATURE: 97.9 F | BODY MASS INDEX: 42.99 KG/M2

## 2023-01-03 DIAGNOSIS — E11.59 TYPE 2 DIABETES MELLITUS WITH OTHER CIRCULATORY COMPLICATION, WITHOUT LONG-TERM CURRENT USE OF INSULIN (HCC): ICD-10-CM

## 2023-01-03 DIAGNOSIS — E66.01 CLASS 3 SEVERE OBESITY DUE TO EXCESS CALORIES WITH SERIOUS COMORBIDITY AND BODY MASS INDEX (BMI) OF 40.0 TO 44.9 IN ADULT (HCC): ICD-10-CM

## 2023-01-03 DIAGNOSIS — F41.1 GAD (GENERALIZED ANXIETY DISORDER): ICD-10-CM

## 2023-01-03 DIAGNOSIS — R35.1 BENIGN PROSTATIC HYPERPLASIA WITH NOCTURIA: ICD-10-CM

## 2023-01-03 DIAGNOSIS — N40.1 BENIGN PROSTATIC HYPERPLASIA WITH NOCTURIA: ICD-10-CM

## 2023-01-03 DIAGNOSIS — I25.119 ATHEROSCLEROSIS OF NATIVE CORONARY ARTERY OF NATIVE HEART WITH ANGINA PECTORIS (HCC): ICD-10-CM

## 2023-01-03 DIAGNOSIS — J62.8 CHRONIC SILICOSIS (HCC): ICD-10-CM

## 2023-01-03 DIAGNOSIS — F33.2 MODERATELY SEVERE RECURRENT MAJOR DEPRESSION (HCC): ICD-10-CM

## 2023-01-03 DIAGNOSIS — I25.10 CORONARY ARTERY DISEASE INVOLVING NATIVE CORONARY ARTERY OF NATIVE HEART WITHOUT ANGINA PECTORIS: ICD-10-CM

## 2023-01-03 DIAGNOSIS — M45.0 ANKYLOSING SPONDYLITIS OF MULTIPLE SITES IN SPINE (HCC): ICD-10-CM

## 2023-01-03 DIAGNOSIS — S39.012D STRAIN OF MUSCLE, FASCIA AND TENDON OF LOWER BACK, SUBSEQUENT ENCOUNTER: Primary | ICD-10-CM

## 2023-01-03 PROCEDURE — 1123F ACP DISCUSS/DSCN MKR DOCD: CPT | Performed by: INTERNAL MEDICINE

## 2023-01-03 PROCEDURE — 99214 OFFICE O/P EST MOD 30 MIN: CPT | Performed by: INTERNAL MEDICINE

## 2023-01-03 RX ORDER — TIZANIDINE 4 MG/1
4 TABLET ORAL 3 TIMES DAILY PRN
Qty: 30 TABLET | Refills: 0 | Status: SHIPPED | OUTPATIENT
Start: 2023-01-03

## 2023-01-03 RX ORDER — TAMSULOSIN HYDROCHLORIDE 0.4 MG/1
CAPSULE ORAL
Qty: 30 CAPSULE | Refills: 5 | Status: SHIPPED | OUTPATIENT
Start: 2023-01-03

## 2023-01-03 RX ORDER — DULOXETIN HYDROCHLORIDE 60 MG/1
120 CAPSULE, DELAYED RELEASE ORAL DAILY
Qty: 180 CAPSULE | Refills: 1 | Status: SHIPPED | OUTPATIENT
Start: 2023-01-03

## 2023-01-03 RX ORDER — BUSPIRONE HYDROCHLORIDE 10 MG/1
TABLET ORAL
Qty: 180 TABLET | Refills: 1 | Status: SHIPPED | OUTPATIENT
Start: 2023-01-03

## 2023-01-03 RX ORDER — METOPROLOL SUCCINATE 50 MG/1
50 TABLET, EXTENDED RELEASE ORAL DAILY
Qty: 90 TABLET | Refills: 1 | Status: SHIPPED | OUTPATIENT
Start: 2023-01-03

## 2023-01-03 ASSESSMENT — ENCOUNTER SYMPTOMS
NAUSEA: 0
CONSTIPATION: 0
CHOKING: 0
CHEST TIGHTNESS: 0
VOMITING: 0
COUGH: 0
WHEEZING: 0
SHORTNESS OF BREATH: 0
ABDOMINAL PAIN: 0
DIARRHEA: 0
ANAL BLEEDING: 0
BLOOD IN STOOL: 0

## 2023-01-03 ASSESSMENT — PATIENT HEALTH QUESTIONNAIRE - PHQ9
6. FEELING BAD ABOUT YOURSELF - OR THAT YOU ARE A FAILURE OR HAVE LET YOURSELF OR YOUR FAMILY DOWN: 0
4. FEELING TIRED OR HAVING LITTLE ENERGY: 0
10. IF YOU CHECKED OFF ANY PROBLEMS, HOW DIFFICULT HAVE THESE PROBLEMS MADE IT FOR YOU TO DO YOUR WORK, TAKE CARE OF THINGS AT HOME, OR GET ALONG WITH OTHER PEOPLE: 0
1. LITTLE INTEREST OR PLEASURE IN DOING THINGS: 0
SUM OF ALL RESPONSES TO PHQ QUESTIONS 1-9: 0
3. TROUBLE FALLING OR STAYING ASLEEP: 0
9. THOUGHTS THAT YOU WOULD BE BETTER OFF DEAD, OR OF HURTING YOURSELF: 0
SUM OF ALL RESPONSES TO PHQ QUESTIONS 1-9: 0
8. MOVING OR SPEAKING SO SLOWLY THAT OTHER PEOPLE COULD HAVE NOTICED. OR THE OPPOSITE, BEING SO FIGETY OR RESTLESS THAT YOU HAVE BEEN MOVING AROUND A LOT MORE THAN USUAL: 0
2. FEELING DOWN, DEPRESSED OR HOPELESS: 0
SUM OF ALL RESPONSES TO PHQ9 QUESTIONS 1 & 2: 0
5. POOR APPETITE OR OVEREATING: 0
SUM OF ALL RESPONSES TO PHQ QUESTIONS 1-9: 0
7. TROUBLE CONCENTRATING ON THINGS, SUCH AS READING THE NEWSPAPER OR WATCHING TELEVISION: 0
SUM OF ALL RESPONSES TO PHQ QUESTIONS 1-9: 0

## 2023-01-03 ASSESSMENT — VISUAL ACUITY: OU: 1

## 2023-01-03 NOTE — PROGRESS NOTES
[facility] PHYSICIANS  [facility] Kody Ennis [address]  [address] [place], [place] [address]  Dept: [phone]  Dept Fax: [phone]      Nini Zimmerman is a 67 y.o. male who presents today for hismedical conditions/complaints as noted below. Nini Zimmerman is c/o of Diabetes (F/u)        Assessment/Plan:     1. Strain of muscle, fascia and tendon of lower back, subsequent encounter  -     tiZANidine (ZANAFLEX) 4 MG tablet; Take 1 tablet by mouth 3 times daily as needed (back pain), Disp-30 tablet, R-0Normal  2. Moderately severe recurrent major depression (HCC)  -     DULoxetine (CYMBALTA) 60 MG extended release capsule; Take 2 capsules by mouth daily, Disp-180 capsule, R-1Normal  3. Ankylosing spondylitis of multiple sites in spine ([facility]  4. Chronic silicosis ([facility] [facility]  5. Atherosclerosis of native coronary artery of native heart with angina pectoris ([facility] [address]  6. Type 2 diabetes mellitus with other circulatory complication, without long-term current use of insulin (HCC)  7. Class 3 severe obesity due to excess calories with serious comorbidity and body mass index (BMI) of 40.0 to 44.9 in adult ([facility]  8. Benign prostatic hyperplasia with nocturia  -     tamsulosin (FLOMAX) 0.4 MG capsule; TAKE ONE CAPSULE BY MOUTH DAILY, Disp-30 capsule, R-5Normal  9. ANATOLY (generalized anxiety disorder)  -     DULoxetine (CYMBALTA) 60 MG extended release capsule; Take 2 capsules by mouth daily, Disp-180 capsule, R-1Normal  -     busPIRone (BUSPAR) 10 MG tablet; TAKE ONE TABLET BY MOUTH TWICE A DAY, Disp-180 tablet, R-1Normal  10. Coronary artery disease involving native coronary artery of native heart without angina pectoris  -     metoprolol succinate (TOPROL XL) 50 MG extended release tablet; Take 1 tablet by mouth daily, Disp-90 tablet, R-1Normal          No follow-ups on file. HPI     States never got a rash, but took the valtrex anyways.  States the muscle relaxer on its own was not helping enough, so started the valtrex after three days, states it helped quite a bit. Still has some pain and itching, not as bad as LV. Following [facility] pulm, has appointment later this week   Depression - doing well on cymbalta. Denies anhedonia, nervousness, sleep difficulty, racing thoughts, auditory or visual hallucination, thoughts of self harm, suicidal or homicidal ideation. Hypertension-tolerating current regimen without chest pain, palpitations, dizziness, peripheral edema, dyspnea on exertion, orthopnea, paroxysmal nocturnal dyspnea. Hyperlipidemia-tolerating current regimen without myalgias, dyspepsia, jaundice. Mostly compliant with diet recommendations for low carb diet, not very compliant with exercise recommendations. Cardiovascular risk factors: advanced age (older than 54 for men, 72 for women), diabetes mellitus, dyslipidemia, hypertension, male gender, obesity (BMI >= 30 kg/m2), and sedentary lifestyle          BP Readings from Last 3 Encounters:   23 120/70   22 130/78   22 124/68              Past Medical History:   Diagnosis Date    Ankylosing spondylitis (HCC)     Arthritis     Asthma not dependent on systemic steroids without complication     CAD (coronary artery disease)     Hypertension     DEB treated with BiPAP 2017    Pulmonary hypertension ([facility] [address]     Silicosis ([facility] [address]     [facility] elevation [facility]) [facility] 2017      Past Surgical History:   Procedure Laterality Date    CORONARY ANGIOPLASTY WITH STENT PLACEMENT  2017    JOINT REPLACEMENT      right hip       No family history on file.     Social History     Tobacco Use    Smoking status: Former     Packs/day: 0.25     Years: 1.00     Pack years: 0.25     Types: Cigarettes     Quit date: 1975     Years since quittin.1    Smokeless tobacco: Never   Substance Use Topics    Alcohol use: Yes     Comment: meryl social drinker        Allergies   Allergen Reactions    Penicillins Prior to Visit Medications    Medication Sig Taking? Authorizing Provider   nitroGLYCERIN (NITROSTAT) 0.4 MG SL tablet Place 1 tablet under the tongue every 5 minutes as needed for Chest pain up to max of 3 total doses. If no relief after 1 dose, call 911. Yes Doug Silveira MD   tiZANidine (ZANAFLEX) 2 MG tablet Take 1 tablet by mouth 3 times daily as needed (back pain) Yes Carole Boo MD   valACYclovir (VALTREX) 1 g tablet Take 1 tablet by mouth 3 times daily for 7 days Yes Carole Boo MD   meloxicam (MOBIC) 15 MG tablet TAKE ONE TABLET BY MOUTH DAILY Yes Carole Boo MD   lisinopril (PRINIVIL;ZESTRIL) 10 MG tablet TAKE ONE TABLET BY MOUTH DAILY Yes Doug Silveira MD   Lancets MISC 1 each by Does not apply route 2 times daily Yes Doug Silveira MD   Lancets ([address]) [address] USE TO TEST TWICE A DAY Yes Doug Silveira MD   citalopram (CELEXA) 20 MG tablet  Yes Historical Provider, MD   metFORMIN (GLUCOPHAGE-XR) 500 MG extended release tablet Take 1 tablet by mouth 2 times daily (with meals) Yes Carole Boo MD   Handicap Placard [address] by Does not apply route EXP:  05/18/2027 Yes Carole Boo MD   tamsulosin (FLOMAX) 0.4 MG capsule TAKE ONE CAPSULE BY MOUTH DAILY Yes Carole Boo MD   DULoxetine (CYMBALTA) 60 MG extended release capsule Take 2 capsules by mouth daily Yes Carole Boo MD   metoprolol succinate (TOPROL XL) 50 MG extended release tablet Take 1 tablet by mouth daily Yes Carole Boo MD   busPIRone (BUSPAR) 10 MG tablet TAKE ONE TABLET BY MOUTH TWICE A DAY Yes Carole Boo MD   terbinafine (LAMISIL) 250 MG tablet  Yes Historical Provider, MD   ondansetron (ZOFRAN) 4 MG tablet Take 1 tablet by mouth daily as needed for Nausea or Vomiting Yes JULIA Holliday NP   blood glucose monitor strips Test 1 time a day & as needed for symptoms of irregular blood glucose.  Yes Carole Boo MD   BRILINTA 60 MG TABS tablet Take 1 tablet by mouth daily Yes JULIA Cheney - CNP   Loratadine (CLARITIN PO) Take 10 mg by mouth daily Yes Historical Provider, MD   Blood Glucose Monitoring Suppl (BLOOD GLUCOSE SYSTEM MICHAELA) KIT Test once a day Yes Carole Perales MD   amLODIPine (NORVASC) 2.5 MG tablet TAKE 1 Tablet  BY MOUTH EVERY DAY Yes Ilsa Staton MD   atorvastatin (LIPITOR) 80 MG tablet Take 1 tablet by mouth nightly Yes Liza Loredo MD   aspirin 81 MG chewable tablet Take 1 tablet by mouth daily Yes Liza Loredo MD   montelukast (SINGULAIR) 10 MG tablet Take 10 mg by mouth nightly Yes Historical Provider, MD   albuterol (PROVENTIL) (2.5 MG/3ML) 0.083% nebulizer solution Take 2.5 mg by nebulization 4 times daily Yes Historical Provider, MD   Fluticasone furoate-vilanterol (BREO ELLIPTA) 200-25 MCG/INH AEPB inhaler Inhale 200 mcg into the lungs daily Yes Historical Provider, MD       Review of Systems     Review of Systems   Constitutional:  Negative for fatigue, fever and unexpected weight change. Respiratory:  Negative for cough, choking, chest tightness, shortness of breath and wheezing. Cardiovascular:  Negative for chest pain, palpitations and leg swelling. Gastrointestinal:  Negative for abdominal pain, anal bleeding, blood in stool, constipation, diarrhea, nausea and vomiting. Endocrine: Negative. Musculoskeletal:  Negative for joint swelling and myalgias. Skin: Negative. Neurological:  Negative for dizziness. Psychiatric/Behavioral:  Negative for sleep disturbance. All other systems reviewed and are negative. Objective     /70 (Site: Left Upper Arm, Position: Sitting, Cuff Size: Large Adult)   Pulse 85   Temp 97.9 °F (36.6 °C)   Wt 289 lb (131.1 kg)   SpO2 95%   BMI 42.99 kg/m²   Physical Exam  Vitals and nursing note reviewed. Constitutional:       General: He is not in acute distress. Appearance: He is well-developed. He is not ill-appearing.    Eyes:      General: Lids are normal. Vision grossly intact. Cardiovascular:      Rate and Rhythm: Normal rate and regular rhythm. Heart sounds: Normal heart sounds, S1 normal and S2 normal. No murmur heard. No friction rub. No gallop. Pulmonary:      Effort: Pulmonary effort is normal. No respiratory distress. Breath sounds: Normal breath sounds. No wheezing. Abdominal:      General: Bowel sounds are normal.      Palpations: Abdomen is soft. There is no mass. Tenderness: There is no abdominal tenderness. There is no guarding. Musculoskeletal:         General: Normal range of motion. Skin:     General: Skin is warm and dry. Capillary Refill: Capillary refill takes less than 2 seconds. Neurological:      General: No focal deficit present. Mental Status: He is alert and oriented to person, place, and time. Data Review       Health Maintenance Due   Topic Date Due    Shingles vaccine (1 of 2) Never done    Diabetic foot exam  09/15/2022    Diabetic Alb to Cr ratio (uACR) test  01/18/2023           Patient given educational materials- see patient instructions. Discussed use, benefit, and side effects of prescribedmedications. All patient questions answered. Pt voiced understanding. Reviewedhealth maintenance. Instructed to continue current medications, diet and exercise. Patient agreed with treatment plan. Follow up as directed.      Electronically signedby Otis Orr MD on 1/3/2023

## 2023-01-03 NOTE — PROGRESS NOTES
Pt is here today for a regular 3 mo f/u    Pt states he is still having some of the shingle symptoms, they have gotten better, still having middle of the back discomfort/pain, still having tenderness and itching, pt was wondering if he should get another refill on the valacyclovir

## 2023-05-03 ENCOUNTER — OFFICE VISIT (OUTPATIENT)
Dept: FAMILY MEDICINE CLINIC | Age: 73
End: 2023-05-03

## 2023-05-03 VITALS
WEIGHT: 280.6 LBS | TEMPERATURE: 97.9 F | BODY MASS INDEX: 41.74 KG/M2 | SYSTOLIC BLOOD PRESSURE: 130 MMHG | OXYGEN SATURATION: 96 % | DIASTOLIC BLOOD PRESSURE: 72 MMHG | HEART RATE: 103 BPM

## 2023-05-03 DIAGNOSIS — E11.69 DYSLIPIDEMIA DUE TO TYPE 2 DIABETES MELLITUS (HCC): ICD-10-CM

## 2023-05-03 DIAGNOSIS — E11.59 TYPE 2 DIABETES MELLITUS WITH OTHER CIRCULATORY COMPLICATION, WITHOUT LONG-TERM CURRENT USE OF INSULIN (HCC): Primary | ICD-10-CM

## 2023-05-03 DIAGNOSIS — F33.2 MODERATELY SEVERE RECURRENT MAJOR DEPRESSION (HCC): ICD-10-CM

## 2023-05-03 DIAGNOSIS — Z11.59 NEED FOR HEPATITIS C SCREENING TEST: ICD-10-CM

## 2023-05-03 DIAGNOSIS — I10 ESSENTIAL HYPERTENSION: ICD-10-CM

## 2023-05-03 DIAGNOSIS — R35.1 BENIGN PROSTATIC HYPERPLASIA WITH NOCTURIA: ICD-10-CM

## 2023-05-03 DIAGNOSIS — Z12.5 ENCOUNTER FOR SCREENING FOR MALIGNANT NEOPLASM OF PROSTATE: ICD-10-CM

## 2023-05-03 DIAGNOSIS — N40.1 BENIGN PROSTATIC HYPERPLASIA WITH NOCTURIA: ICD-10-CM

## 2023-05-03 DIAGNOSIS — J45.40 MODERATE PERSISTENT ASTHMA WITHOUT COMPLICATION: ICD-10-CM

## 2023-05-03 DIAGNOSIS — R49.9 CHANGE IN VOICE: ICD-10-CM

## 2023-05-03 DIAGNOSIS — E78.5 DYSLIPIDEMIA DUE TO TYPE 2 DIABETES MELLITUS (HCC): ICD-10-CM

## 2023-05-03 LAB
CREATININE URINE POCT: 300
MICROALBUMIN/CREAT 24H UR: 80 MG/G{CREAT}
MICROALBUMIN/CREAT UR-RTO: <30

## 2023-05-03 RX ORDER — TAMSULOSIN HYDROCHLORIDE 0.4 MG/1
CAPSULE ORAL
Qty: 30 CAPSULE | Refills: 5 | Status: SHIPPED | OUTPATIENT
Start: 2023-05-03

## 2023-05-03 ASSESSMENT — ENCOUNTER SYMPTOMS
SHORTNESS OF BREATH: 0
BLOOD IN STOOL: 0
WHEEZING: 0
COUGH: 0
ABDOMINAL PAIN: 0
NAUSEA: 0
CONSTIPATION: 0
CHOKING: 0
DIARRHEA: 0
CHEST TIGHTNESS: 0
ANAL BLEEDING: 0
VOMITING: 0

## 2023-05-03 ASSESSMENT — VISUAL ACUITY: OU: 1

## 2023-05-03 NOTE — PROGRESS NOTES
[facility] PHYSICIANS  [facility] Ratna Latoya Simms [address]  [address] [place] [id number]  Dept: [phone]  Dept Fax: [phone]      Ananya Bravo is a 67 y.o. male who presents today for hismedical conditions/complaints as noted below. Walalce DAVID Oscar is c/o of Nausea (F/u)        Assessment/Plan:     1. Type 2 diabetes mellitus with other circulatory complication, without long-term current use of insulin ([address]  2. Benign prostatic hyperplasia with nocturia  The following orders have not been finalized:  -     tamsulosin (FLOMAX) 0.4 MG capsule          No follow-ups on file. HPI     His voice is weaker the last two weeks, no hoarseness. His mouth is dry all the time, thinks related to biPAP use - but also having this during the day when he is not using his machine. No new medicines     Diabetes - doing well with current regimen - 170-180 in the morning the last 4-5 days, typically running in 120-130s fasting. Denies hypoglycemia, hyperglycemia, fatigue, polyuria, polydipsia, paresthesias, vision changes, dizziness. Eye exam was done. Not following with podiatry. Patient is taking ACE inhibitor/ARB. Complications of diabetes include HLD. Hypertension-tolerating current regimen without chest pain, palpitations, dizziness, peripheral edema, dyspnea on exertion, orthopnea, paroxysmal nocturnal dyspnea. Hyperlipidemia-tolerating current regimen without myalgias, dyspepsia, jaundice. Mostly compliant with diet recommendations for low carb diet, not very compliant with exercise recommendations.     Cardiovascular risk factors: advanced age (older than 54 for men, 72 for women), diabetes mellitus, dyslipidemia, hypertension, obesity (BMI >= 30 kg/m2), and sedentary lifestyle      BP Readings from Last 3 Encounters:   05/03/23 130/72   04/05/23 104/68   01/03/23 120/70              Past Medical History:   Diagnosis Date    Ankylosing spondylitis (HCC)     Arthritis     Asthma not dependent

## 2023-05-03 NOTE — PROGRESS NOTES
Pt is here today for a f/u on nausea     Pt states he is feeling better, states the weather has been draining to him     Pt states he has been drinking more water     States his sugar has been a little higher than usual fr him, states it has been around 150, usually 130 in the morning    States he feels weak, dry mouth and throat   Using a bypap machine, thinks this may be why his mouth is dry

## 2023-05-03 NOTE — PATIENT INSTRUCTIONS
If the vomiting and diarrhea starts up again, then use the ondansetron and Bentyl as we had discussed last visit.

## 2023-05-08 DIAGNOSIS — E11.59 TYPE 2 DIABETES MELLITUS WITH OTHER CIRCULATORY COMPLICATION, WITHOUT LONG-TERM CURRENT USE OF INSULIN (HCC): ICD-10-CM

## 2023-05-08 DIAGNOSIS — I10 ESSENTIAL HYPERTENSION: ICD-10-CM

## 2023-05-08 RX ORDER — LISINOPRIL 10 MG/1
TABLET ORAL
Qty: 90 TABLET | Refills: 1 | Status: SHIPPED | OUTPATIENT
Start: 2023-05-08

## 2023-05-08 NOTE — TELEPHONE ENCOUNTER
Last visit: 05/03/23  Last Med refill: 02/09/23  Does patient have enough medication for 72 hours: No:     Next Visit Date:  Future Appointments   Date Time Provider Apolinar Franklini   9/7/2023  1:00 PM Carole Mcmanus MD [facility]   9/27/2023  2:45 PM Carole Mcmanus  [address] Maintenance   Topic Date Due    Hepatitis C screen  Never done    Shingles vaccine (1 of 2) Never done    Diabetic foot exam  09/15/2022    Lipids  03/23/2023    GFR test (Diabetes, CKD 3-4, OR last GFR 15-59)  04/20/2023    Annual Wellness Visit (AWV)  09/27/2023    A1C test (Diabetic or Prediabetic)  12/27/2023    Depression Monitoring  01/03/2024    Diabetic retinal exam  03/13/2024    Colorectal Cancer Screen  03/25/2024    Diabetic Alb to Cr ratio (uACR) test  05/03/2024    DTaP/Tdap/Td vaccine (2 - Td or Tdap) 09/26/2032    Flu vaccine  Completed    Pneumococcal 65+ years Vaccine  Completed    COVID-19 Vaccine  Completed    AAA screen  Completed    Hepatitis A vaccine  Aged Out    Hib vaccine  Aged Out    Meningococcal (ACWY) vaccine  Aged Out    Prostate Specific Antigen (PSA) Screening or Monitoring  Discontinued       Hemoglobin A1C (%)   Date Value   12/27/2022 8.1   09/26/2022 9.0   05/18/2022 8.1             ( goal A1C is < 7)   No results found for: LABMICR  LDL Cholesterol (mg/dL)   Date Value   03/23/2022 59   12/16/2020 39       (goal LDL is <100)   AST (U/L)   Date Value   04/20/2022 14     ALT (U/L)   Date Value   04/20/2022 22     BUN (mg/dL)   Date Value   04/20/2022 18     BP Readings from Last 3 Encounters:   05/03/23 130/72   04/05/23 104/68   01/03/23 120/70          (goal 120/80)    All Future Testing planned in [facility]  Lab Frequency Next Occurrence   Hemoglobin A1C Once 05/03/2023   Lipid, Fasting Once 05/03/2023   Comprehensive Metabolic Panel Once 97/48/2706   PSA Screening Once 05/03/2023   Hepatitis C Antibody Once 05/03/2023               Patient Active Problem

## 2023-05-17 DIAGNOSIS — E11.59 TYPE 2 DIABETES MELLITUS WITH OTHER CIRCULATORY COMPLICATION, WITHOUT LONG-TERM CURRENT USE OF INSULIN (HCC): ICD-10-CM

## 2023-05-17 RX ORDER — METFORMIN HYDROCHLORIDE 500 MG/1
TABLET, EXTENDED RELEASE ORAL
Qty: 180 TABLET | Refills: 1 | Status: SHIPPED | OUTPATIENT
Start: 2023-05-17

## 2023-05-17 NOTE — TELEPHONE ENCOUNTER
Ankylosing spondylitis of multiple sites in spine ([facility] [address]     Chronic silicosis ([facility]     Moderate persistent asthma without complication     Coronary artery disease involving native coronary artery of native heart without angina pectoris     History of ST elevation myocardial infarction (STEMI)     Class 3 severe obesity due to excess calories with serious comorbidity and body mass index (BMI) of 40.0 to 44.9 in [facility])     Moderately severe recurrent major depression (HCC)     Eczema of face     Benign prostatic hyperplasia with nocturia     Type 2 diabetes mellitus with other circulatory complication, without long-term current use of insulin (HCC)     Angina pectoris, unspecified     Atherosclerotic heart disease of native coronary artery with unspecified angina pectoris

## 2023-07-12 ENCOUNTER — HOSPITAL ENCOUNTER (OUTPATIENT)
Age: 73
Discharge: HOME | End: 2023-07-12
Payer: MEDICARE

## 2023-07-12 ENCOUNTER — APPOINTMENT (OUTPATIENT)
Dept: CT IMAGING | Age: 73
End: 2023-07-12
Payer: MEDICARE

## 2023-07-12 ENCOUNTER — APPOINTMENT (OUTPATIENT)
Dept: GENERAL RADIOLOGY | Age: 73
End: 2023-07-12
Payer: MEDICARE

## 2023-07-12 ENCOUNTER — HOSPITAL ENCOUNTER (INPATIENT)
Age: 73
LOS: 2 days | Discharge: HOME | End: 2023-07-14
Attending: EMERGENCY MEDICINE | Admitting: INTERNAL MEDICINE
Payer: MEDICARE

## 2023-07-12 DIAGNOSIS — I48.91 NEW ONSET ATRIAL FIBRILLATION (HCC): Primary | ICD-10-CM

## 2023-07-12 LAB
ANION GAP SERPL CALCULATED.3IONS-SCNC: 12 MMOL/L (ref 9–17)
BASOPHILS # BLD: 0.09 K/UL (ref 0–0.2)
BASOPHILS NFR BLD: 1 % (ref 0–2)
BUN SERPL-MCNC: 19 MG/DL (ref 8–23)
CALCIUM SERPL-MCNC: 9.6 MG/DL (ref 8.6–10.4)
CHLORIDE SERPL-SCNC: 102 MMOL/L (ref 98–107)
CO2 SERPL-SCNC: 24 MMOL/L (ref 20–31)
CREAT SERPL-MCNC: 0.7 MG/DL (ref 0.7–1.2)
D DIMER PPP FEU-MCNC: 0.77 UG/ML FEU (ref 0–0.59)
EKG ATRIAL RATE: 108 BPM
EKG Q-T INTERVAL: 326 MS
EKG QRS DURATION: 106 MS
EKG QTC CALCULATION (BAZETT): 449 MS
EKG R AXIS: -28 DEGREES
EKG T AXIS: 70 DEGREES
EKG VENTRICULAR RATE: 114 BPM
EOSINOPHIL # BLD: 0.18 K/UL (ref 0–0.4)
EOSINOPHILS RELATIVE PERCENT: 2 % (ref 0–4)
ERYTHROCYTE [DISTWIDTH] IN BLOOD BY AUTOMATED COUNT: 17.1 % (ref 11.5–14.9)
GFR SERPL CREATININE-BSD FRML MDRD: >60 ML/MIN/1.73M2
GLUCOSE BLD-MCNC: 121 MG/DL (ref 75–110)
GLUCOSE BLD-MCNC: 182 MG/DL (ref 75–110)
GLUCOSE SERPL-MCNC: 155 MG/DL (ref 70–99)
HCT VFR BLD AUTO: 39.9 % (ref 41–53)
HGB BLD-MCNC: 12.6 G/DL (ref 13.5–17.5)
INR PPP: 1.1
LYMPHOCYTES # BLD: 12 % (ref 24–44)
LYMPHOCYTES NFR BLD: 1.06 K/UL (ref 1–4.8)
MCH RBC QN AUTO: 24 PG (ref 26–34)
MCHC RBC AUTO-ENTMCNC: 31.6 G/DL (ref 31–37)
MCV RBC AUTO: 75.9 FL (ref 80–100)
MONOCYTES NFR BLD: 0.88 K/UL (ref 0.1–1.3)
MONOCYTES NFR BLD: 10 % (ref 1–7)
MORPHOLOGY: ABNORMAL
MYOGLOBIN SERPL-MCNC: 44 NG/ML (ref 28–72)
MYOGLOBIN SERPL-MCNC: 50 NG/ML (ref 28–72)
NEUTROPHILS NFR BLD: 75 % (ref 36–66)
NEUTS SEG NFR BLD: 6.59 K/UL (ref 1.3–9.1)
PARTIAL THROMBOPLASTIN TIME: 30.1 SEC (ref 24–36)
PLATELET # BLD AUTO: 265 K/UL (ref 150–450)
PMV BLD AUTO: 8.5 FL (ref 6–12)
POTASSIUM SERPL-SCNC: 4.2 MMOL/L (ref 3.7–5.3)
PROTHROMBIN TIME: 14.5 SEC (ref 11.8–14.6)
RBC # BLD AUTO: 5.26 M/UL (ref 4.5–5.9)
SODIUM SERPL-SCNC: 138 MMOL/L (ref 135–144)
T4 FREE SERPL-MCNC: 1.3 NG/DL (ref 0.9–1.7)
TROPONIN I SERPL HS-MCNC: 15 NG/L (ref 0–22)
TROPONIN I SERPL HS-MCNC: 16 NG/L (ref 0–22)
TSH SERPL DL<=0.05 MIU/L-ACNC: 1.15 UIU/ML (ref 0.3–5)
WBC OTHER # BLD: 8.8 K/UL (ref 3.5–11)

## 2023-07-12 PROCEDURE — 85379 FIBRIN DEGRADATION QUANT: CPT

## 2023-07-12 PROCEDURE — 94664 DEMO&/EVAL PT USE INHALER: CPT

## 2023-07-12 PROCEDURE — 93005 ELECTROCARDIOGRAM TRACING: CPT | Performed by: EMERGENCY MEDICINE

## 2023-07-12 PROCEDURE — 6360000002 HC RX W HCPCS

## 2023-07-12 PROCEDURE — 85610 PROTHROMBIN TIME: CPT

## 2023-07-12 PROCEDURE — 99223 1ST HOSP IP/OBS HIGH 75: CPT | Performed by: INTERNAL MEDICINE

## 2023-07-12 PROCEDURE — 85027 COMPLETE CBC AUTOMATED: CPT

## 2023-07-12 PROCEDURE — 82947 ASSAY GLUCOSE BLOOD QUANT: CPT

## 2023-07-12 PROCEDURE — 85730 THROMBOPLASTIN TIME PARTIAL: CPT

## 2023-07-12 PROCEDURE — 84439 ASSAY OF FREE THYROXINE: CPT

## 2023-07-12 PROCEDURE — 2500000003 HC RX 250 WO HCPCS: Performed by: INTERNAL MEDICINE

## 2023-07-12 PROCEDURE — 6370000000 HC RX 637 (ALT 250 FOR IP)

## 2023-07-12 PROCEDURE — 36415 COLL VENOUS BLD VENIPUNCTURE: CPT

## 2023-07-12 PROCEDURE — 84443 ASSAY THYROID STIM HORMONE: CPT

## 2023-07-12 PROCEDURE — 94761 N-INVAS EAR/PLS OXIMETRY MLT: CPT

## 2023-07-12 PROCEDURE — 71260 CT THORAX DX C+: CPT

## 2023-07-12 PROCEDURE — 6360000004 HC RX CONTRAST MEDICATION: Performed by: INTERNAL MEDICINE

## 2023-07-12 PROCEDURE — 2580000003 HC RX 258

## 2023-07-12 PROCEDURE — 2500000003 HC RX 250 WO HCPCS: Performed by: EMERGENCY MEDICINE

## 2023-07-12 PROCEDURE — 94640 AIRWAY INHALATION TREATMENT: CPT

## 2023-07-12 PROCEDURE — 96374 THER/PROPH/DIAG INJ IV PUSH: CPT

## 2023-07-12 PROCEDURE — 93010 ELECTROCARDIOGRAM REPORT: CPT | Performed by: INTERNAL MEDICINE

## 2023-07-12 PROCEDURE — 80048 BASIC METABOLIC PNL TOTAL CA: CPT

## 2023-07-12 PROCEDURE — 99285 EMERGENCY DEPT VISIT HI MDM: CPT

## 2023-07-12 PROCEDURE — 93005 ELECTROCARDIOGRAM TRACING: CPT | Performed by: NURSE PRACTITIONER

## 2023-07-12 PROCEDURE — 2580000003 HC RX 258: Performed by: INTERNAL MEDICINE

## 2023-07-12 PROCEDURE — 71045 X-RAY EXAM CHEST 1 VIEW: CPT

## 2023-07-12 PROCEDURE — 2060000000 HC ICU INTERMEDIATE R&B

## 2023-07-12 PROCEDURE — 84484 ASSAY OF TROPONIN QUANT: CPT

## 2023-07-12 PROCEDURE — 83874 ASSAY OF MYOGLOBIN: CPT

## 2023-07-12 RX ORDER — SODIUM CHLORIDE 0.9 % (FLUSH) 0.9 %
5-40 SYRINGE (ML) INJECTION PRN
Status: DISCONTINUED | OUTPATIENT
Start: 2023-07-12 | End: 2023-07-14 | Stop reason: HOSPADM

## 2023-07-12 RX ORDER — AMLODIPINE BESYLATE 2.5 MG/1
2.5 TABLET ORAL DAILY
Status: DISCONTINUED | OUTPATIENT
Start: 2023-07-13 | End: 2023-07-13

## 2023-07-12 RX ORDER — 0.9 % SODIUM CHLORIDE 0.9 %
100 INTRAVENOUS SOLUTION INTRAVENOUS ONCE
Status: COMPLETED | OUTPATIENT
Start: 2023-07-12 | End: 2023-07-12

## 2023-07-12 RX ORDER — LISINOPRIL 10 MG/1
10 TABLET ORAL DAILY
Status: DISCONTINUED | OUTPATIENT
Start: 2023-07-13 | End: 2023-07-14 | Stop reason: HOSPADM

## 2023-07-12 RX ORDER — ACETAMINOPHEN 650 MG/1
650 SUPPOSITORY RECTAL EVERY 6 HOURS PRN
Status: DISCONTINUED | OUTPATIENT
Start: 2023-07-12 | End: 2023-07-14 | Stop reason: HOSPADM

## 2023-07-12 RX ORDER — SODIUM CHLORIDE 0.9 % (FLUSH) 0.9 %
10 SYRINGE (ML) INJECTION PRN
Status: COMPLETED | OUTPATIENT
Start: 2023-07-12 | End: 2023-07-12

## 2023-07-12 RX ORDER — DEXTROSE MONOHYDRATE 100 MG/ML
INJECTION, SOLUTION INTRAVENOUS CONTINUOUS PRN
Status: DISCONTINUED | OUTPATIENT
Start: 2023-07-12 | End: 2023-07-14 | Stop reason: HOSPADM

## 2023-07-12 RX ORDER — INSULIN LISPRO 100 [IU]/ML
0-4 INJECTION, SOLUTION INTRAVENOUS; SUBCUTANEOUS
Status: DISCONTINUED | OUTPATIENT
Start: 2023-07-12 | End: 2023-07-14 | Stop reason: HOSPADM

## 2023-07-12 RX ORDER — DILTIAZEM HYDROCHLORIDE 5 MG/ML
10 INJECTION INTRAVENOUS ONCE
Status: COMPLETED | OUTPATIENT
Start: 2023-07-12 | End: 2023-07-12

## 2023-07-12 RX ORDER — ASPIRIN 81 MG/1
81 TABLET, CHEWABLE ORAL DAILY
Status: DISCONTINUED | OUTPATIENT
Start: 2023-07-13 | End: 2023-07-14 | Stop reason: HOSPADM

## 2023-07-12 RX ORDER — SODIUM CHLORIDE 9 MG/ML
INJECTION, SOLUTION INTRAVENOUS PRN
Status: DISCONTINUED | OUTPATIENT
Start: 2023-07-12 | End: 2023-07-14 | Stop reason: HOSPADM

## 2023-07-12 RX ORDER — ENOXAPARIN SODIUM 150 MG/ML
1 INJECTION SUBCUTANEOUS ONCE
Status: DISCONTINUED | OUTPATIENT
Start: 2023-07-12 | End: 2023-07-12

## 2023-07-12 RX ORDER — METOPROLOL SUCCINATE 50 MG/1
50 TABLET, EXTENDED RELEASE ORAL DAILY
Status: DISCONTINUED | OUTPATIENT
Start: 2023-07-13 | End: 2023-07-14 | Stop reason: HOSPADM

## 2023-07-12 RX ORDER — ENOXAPARIN SODIUM 150 MG/ML
1 INJECTION SUBCUTANEOUS 2 TIMES DAILY
Status: DISCONTINUED | OUTPATIENT
Start: 2023-07-12 | End: 2023-07-14

## 2023-07-12 RX ORDER — BUDESONIDE AND FORMOTEROL FUMARATE DIHYDRATE 160; 4.5 UG/1; UG/1
2 AEROSOL RESPIRATORY (INHALATION)
Status: DISCONTINUED | OUTPATIENT
Start: 2023-07-12 | End: 2023-07-14 | Stop reason: HOSPADM

## 2023-07-12 RX ORDER — SODIUM CHLORIDE 0.9 % (FLUSH) 0.9 %
5-40 SYRINGE (ML) INJECTION EVERY 12 HOURS SCHEDULED
Status: DISCONTINUED | OUTPATIENT
Start: 2023-07-12 | End: 2023-07-14 | Stop reason: HOSPADM

## 2023-07-12 RX ORDER — DILTIAZEM HYDROCHLORIDE 5 MG/ML
10 INJECTION INTRAVENOUS EVERY 4 HOURS
Status: DISCONTINUED | OUTPATIENT
Start: 2023-07-12 | End: 2023-07-13

## 2023-07-12 RX ORDER — ALBUTEROL SULFATE 2.5 MG/3ML
2.5 SOLUTION RESPIRATORY (INHALATION) EVERY 6 HOURS PRN
Status: DISCONTINUED | OUTPATIENT
Start: 2023-07-12 | End: 2023-07-14 | Stop reason: HOSPADM

## 2023-07-12 RX ORDER — TAMSULOSIN HYDROCHLORIDE 0.4 MG/1
0.4 CAPSULE ORAL DAILY
Status: DISCONTINUED | OUTPATIENT
Start: 2023-07-13 | End: 2023-07-14 | Stop reason: HOSPADM

## 2023-07-12 RX ORDER — ACETAMINOPHEN 325 MG/1
650 TABLET ORAL EVERY 6 HOURS PRN
Status: DISCONTINUED | OUTPATIENT
Start: 2023-07-12 | End: 2023-07-14 | Stop reason: HOSPADM

## 2023-07-12 RX ORDER — ONDANSETRON 4 MG/1
4 TABLET, ORALLY DISINTEGRATING ORAL EVERY 8 HOURS PRN
Status: DISCONTINUED | OUTPATIENT
Start: 2023-07-12 | End: 2023-07-14 | Stop reason: HOSPADM

## 2023-07-12 RX ORDER — MONTELUKAST SODIUM 10 MG/1
10 TABLET ORAL NIGHTLY
Status: DISCONTINUED | OUTPATIENT
Start: 2023-07-13 | End: 2023-07-14 | Stop reason: HOSPADM

## 2023-07-12 RX ORDER — DULOXETIN HYDROCHLORIDE 60 MG/1
60 CAPSULE, DELAYED RELEASE ORAL 2 TIMES DAILY
Status: DISCONTINUED | OUTPATIENT
Start: 2023-07-12 | End: 2023-07-14 | Stop reason: HOSPADM

## 2023-07-12 RX ORDER — POLYETHYLENE GLYCOL 3350 17 G/17G
17 POWDER, FOR SOLUTION ORAL DAILY PRN
Status: DISCONTINUED | OUTPATIENT
Start: 2023-07-12 | End: 2023-07-14 | Stop reason: HOSPADM

## 2023-07-12 RX ORDER — ONDANSETRON 2 MG/ML
4 INJECTION INTRAMUSCULAR; INTRAVENOUS EVERY 6 HOURS PRN
Status: DISCONTINUED | OUTPATIENT
Start: 2023-07-12 | End: 2023-07-14 | Stop reason: HOSPADM

## 2023-07-12 RX ORDER — DILTIAZEM HYDROCHLORIDE 5 MG/ML
10 INJECTION INTRAVENOUS PRN
Status: DISCONTINUED | OUTPATIENT
Start: 2023-07-12 | End: 2023-07-12

## 2023-07-12 RX ORDER — INSULIN LISPRO 100 [IU]/ML
0-4 INJECTION, SOLUTION INTRAVENOUS; SUBCUTANEOUS NIGHTLY
Status: DISCONTINUED | OUTPATIENT
Start: 2023-07-12 | End: 2023-07-14 | Stop reason: HOSPADM

## 2023-07-12 RX ORDER — ATORVASTATIN CALCIUM 80 MG/1
80 TABLET, FILM COATED ORAL DAILY
Status: DISCONTINUED | OUTPATIENT
Start: 2023-07-13 | End: 2023-07-14 | Stop reason: HOSPADM

## 2023-07-12 RX ADMIN — SODIUM CHLORIDE, PRESERVATIVE FREE 10 ML: 5 INJECTION INTRAVENOUS at 20:50

## 2023-07-12 RX ADMIN — ALBUTEROL SULFATE 2.5 MG: 2.5 SOLUTION RESPIRATORY (INHALATION) at 19:40

## 2023-07-12 RX ADMIN — SODIUM CHLORIDE, PRESERVATIVE FREE 10 ML: 5 INJECTION INTRAVENOUS at 19:36

## 2023-07-12 RX ADMIN — BUDESONIDE AND FORMOTEROL FUMARATE DIHYDRATE 2 PUFF: 160; 4.5 AEROSOL RESPIRATORY (INHALATION) at 19:48

## 2023-07-12 RX ADMIN — DILTIAZEM HYDROCHLORIDE 10 MG: 5 INJECTION INTRAVENOUS at 14:51

## 2023-07-12 RX ADMIN — SODIUM CHLORIDE 100 ML: 9 INJECTION, SOLUTION INTRAVENOUS at 19:35

## 2023-07-12 RX ADMIN — ENOXAPARIN SODIUM 120 MG: 150 INJECTION SUBCUTANEOUS at 20:50

## 2023-07-12 RX ADMIN — DULOXETINE HYDROCHLORIDE 60 MG: 60 CAPSULE, DELAYED RELEASE ORAL at 20:50

## 2023-07-12 RX ADMIN — IOPAMIDOL 75 ML: 755 INJECTION, SOLUTION INTRAVENOUS at 19:36

## 2023-07-12 RX ADMIN — DILTIAZEM HYDROCHLORIDE 10 MG: 5 INJECTION INTRAVENOUS at 20:50

## 2023-07-12 ASSESSMENT — ENCOUNTER SYMPTOMS
ABDOMINAL PAIN: 0
COUGH: 0
ALLERGIC/IMMUNOLOGIC NEGATIVE: 1
FACIAL SWELLING: 0
WHEEZING: 0
CHEST TIGHTNESS: 0
VOMITING: 0
DIARRHEA: 0
TROUBLE SWALLOWING: 0
CHOKING: 0
SHORTNESS OF BREATH: 1
BACK PAIN: 0
GASTROINTESTINAL NEGATIVE: 1
CONSTIPATION: 0
EYES NEGATIVE: 1
SORE THROAT: 0
SINUS PRESSURE: 0
COLOR CHANGE: 0
EYE REDNESS: 0
RHINORRHEA: 0
STRIDOR: 0
EYE PAIN: 0
APNEA: 0
BLOOD IN STOOL: 0
EYE DISCHARGE: 0
SHORTNESS OF BREATH: 0
NAUSEA: 0

## 2023-07-12 ASSESSMENT — HEART SCORE: ECG: 1

## 2023-07-12 ASSESSMENT — LIFESTYLE VARIABLES
HOW MANY STANDARD DRINKS CONTAINING ALCOHOL DO YOU HAVE ON A TYPICAL DAY: PATIENT DOES NOT DRINK
HOW MANY STANDARD DRINKS CONTAINING ALCOHOL DO YOU HAVE ON A TYPICAL DAY: PATIENT DOES NOT DRINK
HOW OFTEN DO YOU HAVE A DRINK CONTAINING ALCOHOL: NEVER
HOW OFTEN DO YOU HAVE A DRINK CONTAINING ALCOHOL: NEVER

## 2023-07-12 ASSESSMENT — PAIN - FUNCTIONAL ASSESSMENT
PAIN_FUNCTIONAL_ASSESSMENT: NONE - DENIES PAIN
PAIN_FUNCTIONAL_ASSESSMENT: NONE - DENIES PAIN

## 2023-07-12 NOTE — ED NOTES
Patient is resting well in bed with no distress noted. Vitals checked and all patient needs met. Patient inquired if family could come back when they appeared in the ED lobby. Author made patient aware that this was acceptable,  and would look out for family to direct them to patient's room.       Danielle Jorgensen RN  07/12/23 2510

## 2023-07-12 NOTE — PROGRESS NOTES
Pharmacy Medication History Note      List of current medications patient is taking is complete. Source of information: Patient, patient medication list, dispense history, OARRS    Changes made to medication list:  Medications flagged for removal (include reason, ex. noncompliance):  Dicyclomine: patient reports short-term course for diarrhea  Ondansetron: patient reports no longer using    Medications removed (include reason, ex. therapy complete or physician discontinued):  none    Medications added/doses adjusted:  Buspirone 10mg: patient reports doctor ordered he take it once daily rather than twice daily    Other notes (ex. Recent course of antibiotics, Coumadin dosing):  OARRS negative    Denies use of other OTC or herbal medications.       Allergies clarified    Medication list provided to the patient:none  Medication education provided to the patient:NONE      Electronically signed by Cristina Crump on 7/12/2023 at 4:11 PM

## 2023-07-12 NOTE — PROGRESS NOTES
Patient admitted to PCU via wheelchair. No CP. He does c/o some SOB. Patient oriented to the room and the call light is within reach.

## 2023-07-12 NOTE — ED PROVIDER NOTES
[facility]  eMERGENCY dEPARTMENT eNCOUnter      Pt Name: Cornel Barrera  MRN: [id number]  [facility] 1950  Date of evaluation: 7/12/23      CHIEF COMPLAINT       Chief Complaint   Patient presents with    Atrial Fibrillation    Shortness of Breath         HISTORY OF PRESENT ILLNESS    Cornel Barrera is a 68 y.o. male who presents complaining of abnormal EKG. Patient has a history of CAD with 1 stent in place and multiple pulmonary issues. Patient states that for the past week he has been feeling just a little short of breath especially with exertion that he attributed to his asthma or just the heat. Patient went to the pulmonologist yesterday noted to be little tachycardic and irregular so sent for an EKG which she got done today that showed A-fib. This is a new diagnosis for him. He does follow with cardiology. Patient is on antiplatelet but not on any anticoagulation. Patient denies any chest pain and has no palpitations. Patient's had no pain or swelling in the legs. REVIEW OF SYSTEMS       Review of Systems   Constitutional:  Negative for activity change, appetite change, chills, diaphoresis and fever. HENT:  Negative for congestion, ear pain, facial swelling, nosebleeds, rhinorrhea, sinus pressure, sore throat and trouble swallowing. Eyes:  Negative for pain, discharge and redness. Respiratory:  Positive for shortness of breath. Negative for cough, chest tightness and wheezing. Cardiovascular:  Negative for chest pain, palpitations and leg swelling. Gastrointestinal:  Negative for abdominal pain, blood in stool, constipation, diarrhea, nausea and vomiting. Genitourinary:  Negative for difficulty urinating, dysuria, flank pain, frequency, genital sores and hematuria. Musculoskeletal:  Negative for arthralgias, back pain, gait problem, joint swelling, myalgias and neck pain. Skin:  Negative for color change, pallor, rash and wound.    Neurological:  Negative for dizziness,

## 2023-07-12 NOTE — H&P
[address]     HISTORY AND PHYSICAL EXAMINATION            Date:   7/12/2023  Patient name:  Valentine Rose  Date of admission:  7/12/2023  2:10 PM  MRN:   [id number]  Account:  [de-identified]  YOB: 1950  PCP:    Sree Ocampo MD  Room:   2118/2118-01  Code Status:    Full Code    Chief Complaint:     Chief Complaint   Patient presents with    Atrial Fibrillation    Shortness of Breath     History Obtained From:     patient    History of Present Illness:   My patient is a 68years old male with Hypertension, T2DM on Metformin, Scoliosis, Asthma, Hx of STEMI with stent since 2017 on dual antiplatelet therapy, with presented to ED with Atrial fibrillation found on EKG done during his outpatient visit to His Pulmonologist.Patient explains he has scoliosis and asthma and was experiencing increased shortness of breath due to air smoke for a week and scheduled an appointment to get checked where he was found in Atrial fibrillation. He does not experience any chest pain, dizziness, Syncopal episodes or palpitations. Ed course is significant for Atrial Fibrillation on EKG, he is given one time dose of diltiazem, which brought HR down to 90s , One dose Lovenox given. Ddimers 0.7, Trop 21, TSH 1.15.   Past Medical History:     Past Medical History:   Diagnosis Date    Ankylosing spondylitis ([address])     Arthritis     Asthma not dependent on systemic steroids without complication 08/97/5177    CAD (coronary artery disease)     Hypertension     DEB treated with BiPAP 12/07/2017    Pulmonary hypertension ([address]) 94/73/9679    Silicosis ([address]) 63/4703    STEMI (ST elevation myocardial infarction) ([address]) 12/9/2017        Past SurgicalHistory:     Past Surgical History:   Procedure Laterality Date    CORONARY ANGIOPLASTY WITH STENT PLACEMENT  12/09/2017

## 2023-07-12 NOTE — ED NOTES
TRANSFER - OUT REPORT:    Verbal report given to BARBARA Razo on [address]  being transferred to PCU 2118 for routine progression of patient care       Report consisted of patient's Situation, Background, Assessment and   Recommendations(SBAR). Information from the following report(s) Nurse Handoff Report, ED Encounter Summary, ED SBAR, Adult Overview, MAR, Recent Results, and Cardiac Rhythm New onset A-Fib  was reviewed with the receiving nurse. Kinder Fall Assessment:                           Lines:   Peripheral IV 07/12/23 Left Antecubital (Active)        Opportunity for questions and clarification was provided.       Patient transported with:  Franko Jordan RN  07/12/23 2667

## 2023-07-13 ENCOUNTER — APPOINTMENT (OUTPATIENT)
Dept: CT IMAGING | Age: 73
End: 2023-07-13
Payer: MEDICARE

## 2023-07-13 ENCOUNTER — APPOINTMENT (OUTPATIENT)
Dept: NON INVASIVE DIAGNOSTICS | Age: 73
End: 2023-07-13
Payer: MEDICARE

## 2023-07-13 LAB
ANION GAP SERPL CALCULATED.3IONS-SCNC: 10 MMOL/L (ref 9–17)
BASOPHILS # BLD: 0.1 K/UL (ref 0–0.2)
BASOPHILS NFR BLD: 1 % (ref 0–2)
BUN SERPL-MCNC: 21 MG/DL (ref 8–23)
CALCIUM SERPL-MCNC: 9.1 MG/DL (ref 8.6–10.4)
CHLORIDE SERPL-SCNC: 101 MMOL/L (ref 98–107)
CO2 SERPL-SCNC: 25 MMOL/L (ref 20–31)
CREAT SERPL-MCNC: 0.7 MG/DL (ref 0.7–1.2)
EOSINOPHIL # BLD: 0.3 K/UL (ref 0–0.4)
EOSINOPHILS RELATIVE PERCENT: 4 % (ref 0–4)
ERYTHROCYTE [DISTWIDTH] IN BLOOD BY AUTOMATED COUNT: 16.9 % (ref 11.5–14.9)
FERRITIN SERPL-MCNC: 39 NG/ML (ref 30–400)
GFR SERPL CREATININE-BSD FRML MDRD: >60 ML/MIN/1.73M2
GLUCOSE BLD-MCNC: 147 MG/DL (ref 75–110)
GLUCOSE BLD-MCNC: 158 MG/DL (ref 75–110)
GLUCOSE BLD-MCNC: 175 MG/DL (ref 75–110)
GLUCOSE BLD-MCNC: 209 MG/DL (ref 75–110)
GLUCOSE SERPL-MCNC: 176 MG/DL (ref 70–99)
HCT VFR BLD AUTO: 38.4 % (ref 41–53)
HGB BLD-MCNC: 12.2 G/DL (ref 13.5–17.5)
IRON SATN MFR SERPL: 10 % (ref 20–55)
IRON SERPL-MCNC: 31 UG/DL (ref 59–158)
LV EF: 30 %
LVEF MODALITY: NORMAL
LYMPHOCYTES # BLD: 14 % (ref 24–44)
LYMPHOCYTES NFR BLD: 1.1 K/UL (ref 1–4.8)
MCH RBC QN AUTO: 24.3 PG (ref 26–34)
MCHC RBC AUTO-ENTMCNC: 31.9 G/DL (ref 31–37)
MCV RBC AUTO: 76.1 FL (ref 80–100)
MONOCYTES NFR BLD: 0.9 K/UL (ref 0.1–1.3)
MONOCYTES NFR BLD: 12 % (ref 1–7)
NEUTROPHILS NFR BLD: 69 % (ref 36–66)
NEUTS SEG NFR BLD: 5.3 K/UL (ref 1.3–9.1)
PLATELET # BLD AUTO: 235 K/UL (ref 150–450)
PMV BLD AUTO: 8.5 FL (ref 6–12)
POTASSIUM SERPL-SCNC: 4.1 MMOL/L (ref 3.7–5.3)
RBC # BLD AUTO: 5.04 M/UL (ref 4.5–5.9)
SODIUM SERPL-SCNC: 136 MMOL/L (ref 135–144)
TIBC SERPL-MCNC: 310 UG/DL (ref 250–450)
UNSATURATED IRON BINDING CAPACITY: 279 UG/DL (ref 112–347)
WBC OTHER # BLD: 7.7 K/UL (ref 3.5–11)

## 2023-07-13 PROCEDURE — 85027 COMPLETE CBC AUTOMATED: CPT

## 2023-07-13 PROCEDURE — 6370000000 HC RX 637 (ALT 250 FOR IP)

## 2023-07-13 PROCEDURE — 6360000002 HC RX W HCPCS

## 2023-07-13 PROCEDURE — 2060000000 HC ICU INTERMEDIATE R&B

## 2023-07-13 PROCEDURE — 2580000003 HC RX 258

## 2023-07-13 PROCEDURE — C8929 TTE W OR WO FOL WCON,DOPPLER: HCPCS

## 2023-07-13 PROCEDURE — 80048 BASIC METABOLIC PNL TOTAL CA: CPT

## 2023-07-13 PROCEDURE — 97161 PT EVAL LOW COMPLEX 20 MIN: CPT

## 2023-07-13 PROCEDURE — 82728 ASSAY OF FERRITIN: CPT

## 2023-07-13 PROCEDURE — 83550 IRON BINDING TEST: CPT

## 2023-07-13 PROCEDURE — 97165 OT EVAL LOW COMPLEX 30 MIN: CPT

## 2023-07-13 PROCEDURE — 99233 SBSQ HOSP IP/OBS HIGH 50: CPT | Performed by: INTERNAL MEDICINE

## 2023-07-13 PROCEDURE — 6360000004 HC RX CONTRAST MEDICATION: Performed by: INTERNAL MEDICINE

## 2023-07-13 PROCEDURE — 5A09357 ASSISTANCE WITH RESPIRATORY VENTILATION, LESS THAN 24 CONSECUTIVE HOURS, CONTINUOUS POSITIVE AIRWAY PRESSURE: ICD-10-PCS | Performed by: INTERNAL MEDICINE

## 2023-07-13 PROCEDURE — 83540 ASSAY OF IRON: CPT

## 2023-07-13 PROCEDURE — 6370000000 HC RX 637 (ALT 250 FOR IP): Performed by: INTERNAL MEDICINE

## 2023-07-13 PROCEDURE — 82947 ASSAY GLUCOSE BLOOD QUANT: CPT

## 2023-07-13 PROCEDURE — 94660 CPAP INITIATION&MGMT: CPT

## 2023-07-13 PROCEDURE — 94761 N-INVAS EAR/PLS OXIMETRY MLT: CPT

## 2023-07-13 PROCEDURE — 71260 CT THORAX DX C+: CPT

## 2023-07-13 PROCEDURE — 99223 1ST HOSP IP/OBS HIGH 75: CPT | Performed by: INTERNAL MEDICINE

## 2023-07-13 PROCEDURE — 2500000003 HC RX 250 WO HCPCS: Performed by: INTERNAL MEDICINE

## 2023-07-13 PROCEDURE — 6360000004 HC RX CONTRAST MEDICATION

## 2023-07-13 PROCEDURE — 36415 COLL VENOUS BLD VENIPUNCTURE: CPT

## 2023-07-13 PROCEDURE — 94640 AIRWAY INHALATION TREATMENT: CPT

## 2023-07-13 RX ORDER — DILTIAZEM HYDROCHLORIDE 120 MG/1
120 CAPSULE, COATED, EXTENDED RELEASE ORAL DAILY
Status: DISCONTINUED | OUTPATIENT
Start: 2023-07-13 | End: 2023-07-14 | Stop reason: HOSPADM

## 2023-07-13 RX ORDER — 0.9 % SODIUM CHLORIDE 0.9 %
100 INTRAVENOUS SOLUTION INTRAVENOUS ONCE
Status: COMPLETED | OUTPATIENT
Start: 2023-07-13 | End: 2023-07-13

## 2023-07-13 RX ORDER — SODIUM CHLORIDE 0.9 % (FLUSH) 0.9 %
10 SYRINGE (ML) INJECTION PRN
Status: DISCONTINUED | OUTPATIENT
Start: 2023-07-13 | End: 2023-07-14 | Stop reason: HOSPADM

## 2023-07-13 RX ADMIN — BUDESONIDE AND FORMOTEROL FUMARATE DIHYDRATE 2 PUFF: 160; 4.5 AEROSOL RESPIRATORY (INHALATION) at 08:11

## 2023-07-13 RX ADMIN — SODIUM CHLORIDE 100 ML: 9 INJECTION, SOLUTION INTRAVENOUS at 18:16

## 2023-07-13 RX ADMIN — DILTIAZEM HYDROCHLORIDE 10 MG: 5 INJECTION INTRAVENOUS at 11:53

## 2023-07-13 RX ADMIN — IOPAMIDOL 75 ML: 755 INJECTION, SOLUTION INTRAVENOUS at 18:15

## 2023-07-13 RX ADMIN — METOPROLOL SUCCINATE 50 MG: 50 TABLET, EXTENDED RELEASE ORAL at 08:40

## 2023-07-13 RX ADMIN — ENOXAPARIN SODIUM 120 MG: 150 INJECTION SUBCUTANEOUS at 21:04

## 2023-07-13 RX ADMIN — ASPIRIN 81 MG: 81 TABLET, CHEWABLE ORAL at 08:40

## 2023-07-13 RX ADMIN — LISINOPRIL 10 MG: 10 TABLET ORAL at 08:39

## 2023-07-13 RX ADMIN — PERFLUTREN 2 ML: 6.52 INJECTION, SUSPENSION INTRAVENOUS at 17:23

## 2023-07-13 RX ADMIN — DULOXETINE HYDROCHLORIDE 60 MG: 60 CAPSULE, DELAYED RELEASE ORAL at 08:39

## 2023-07-13 RX ADMIN — MONTELUKAST 10 MG: 10 TABLET, FILM COATED ORAL at 21:03

## 2023-07-13 RX ADMIN — TAMSULOSIN HYDROCHLORIDE 0.4 MG: 0.4 CAPSULE ORAL at 08:39

## 2023-07-13 RX ADMIN — SODIUM CHLORIDE, PRESERVATIVE FREE 10 ML: 5 INJECTION INTRAVENOUS at 18:15

## 2023-07-13 RX ADMIN — BUDESONIDE AND FORMOTEROL FUMARATE DIHYDRATE 2 PUFF: 160; 4.5 AEROSOL RESPIRATORY (INHALATION) at 19:57

## 2023-07-13 RX ADMIN — SODIUM CHLORIDE, PRESERVATIVE FREE 10 ML: 5 INJECTION INTRAVENOUS at 08:41

## 2023-07-13 RX ADMIN — DILTIAZEM HYDROCHLORIDE 120 MG: 120 CAPSULE, COATED, EXTENDED RELEASE ORAL at 14:41

## 2023-07-13 RX ADMIN — ATORVASTATIN CALCIUM 80 MG: 80 TABLET, FILM COATED ORAL at 08:40

## 2023-07-13 RX ADMIN — SODIUM CHLORIDE, PRESERVATIVE FREE 10 ML: 5 INJECTION INTRAVENOUS at 21:04

## 2023-07-13 RX ADMIN — DULOXETINE HYDROCHLORIDE 60 MG: 60 CAPSULE, DELAYED RELEASE ORAL at 21:03

## 2023-07-13 RX ADMIN — ENOXAPARIN SODIUM 120 MG: 150 INJECTION SUBCUTANEOUS at 08:53

## 2023-07-13 ASSESSMENT — ENCOUNTER SYMPTOMS
NAUSEA: 0
ABDOMINAL PAIN: 0
COUGH: 0
VOMITING: 0
SHORTNESS OF BREATH: 0

## 2023-07-13 NOTE — PROGRESS NOTES
Physical Therapy  Facility/Department: [facility] PROGRESSIVE CARE  Physical Therapy Initial Assessment    Name: Faith Ferrer  : 1950  MRN: [id number]  Date of Service: 2023    Discharge Recommendations:  No therapy recommended at discharge          Patient Diagnosis(es): The encounter diagnosis was New onset atrial fibrillation ([address] Past Medical History:  has a past medical history of Ankylosing spondylitis ([address]), Arthritis, Asthma not dependent on systemic steroids without complication, CAD (coronary artery disease), Hypertension, DEB treated with BiPAP, Pulmonary hypertension ([address]), Silicosis ([address]), and STEMI (ST elevation myocardial infarction) ([address] Past Surgical History:  has a past surgical history that includes joint replacement and Coronary angioplasty with stent (2017). Assessment   Assessment: Pt demonstrates safe mobility at this time, Limited activity due to shortness of breath and A-Fib, HR in high 120's to mid 130's. No need for skilled PT at this time. Pt agrees too. Decision Making: Low Complexity  Requires PT Follow-Up: No     Plan   Physcial Therapy Plan  General Plan: Discharge with evaluation only  Safety Devices  Type of Devices: All fall risk precautions in place, Call light within reach, Left in bed  Restraints  Restraints Initially in Place: No     Restrictions  Restrictions/Precautions  Restrictions/Precautions: Fall Risk, General Precautions, Up as Tolerated  Required Braces or Orthoses?: No  Implants present? : Metal implants (R DANIEL, cardiac stent)  Position Activity Restriction  Other position/activity restrictions: OT/PT eval and treat     Subjective   Pain: Pt denies  General  Patient assessed for rehabilitation services?: Yes  Additional Pertinent Hx: This is a 59-year-old male. He was at his pulmonologist office. Was not feeling well. Found to have a tachycardic plus irregular rhythm. Sent for EKG. EKG came back showing atrial fibrillation.   This is a new

## 2023-07-13 NOTE — PROGRESS NOTES
[address]    PROGRESS NOTE             7/13/2023    12:44 PM    Name:   Julia Flor  MRN:     [id number]     Acct:      [de-identified]   [facility]   [facility]118[facility] Day:  1  Admit Date:  7/12/2023  2:10 PM    PCP:  Corbin Rubin MD  Code Status:  Full Code    Subjective:     C/C:   Chief Complaint   Patient presents with    Atrial Fibrillation    Shortness of Breath     Interval History Status: not changed. Patient examined at bedside this morning. He continues to be in A-fib with heart rate in 110s. He has no current chest pain or shortness of breath but states that his shortness of breath is worse with activity, such as walking. He denies any headaches, fevers, abdominal pain, nausea, vomiting, swelling. Denies any cough or wheezing. Brief History:     68years old male with Hypertension, T2DM on Metformin, silicosis, Asthma, Hx of STEMI with stent since 2017 on dual antiplatelet therapy, with presented to ED with Atrial fibrillation found on EKG done during his outpatient visit to His Pulmonologist.Patient explains he has silicosis and asthma and was experiencing increased shortness of breath due to air smoke for a week and scheduled an appointment to get checked where he was found in Atrial fibrillation. He does not experience any chest pain, dizziness, Syncopal episodes or palpitations. Ed course is significant for Atrial Fibrillation on EKG, he is given one time dose of diltiazem, which brought HR down to 90s , One dose Lovenox given. Ddimers 0.7, Trop 21, TSH 1.15. Review of Systems:     Review of Systems   Constitutional:  Negative for chills and fever. Respiratory:  Negative for cough and shortness of breath (SOB after activity). Cardiovascular:  Negative for chest pain and leg swelling. Gastrointestinal:  Negative for abdominal pain, nausea and vomiting.    Neurological:  Negative for weakness and breathing while laying flat, best images possible FINDINGS: Pulmonary Arteries: Only the central vessels are opacified. The lobar, segmental and subsegmental vessels are not adequately evaluated. There is respiratory motion artifact degrading image resolution. Very limited exam. Mediastinum: Relatively stable scattered nonenlarged and a few mildly enlarged mediastinal and hilar lymph nodes, the majority with calcification. No new bulky hilar or mediastinal adenopathy within limits of a noncontrast exam.  Coronary vascular calcifications and stents. Lungs/pleura: Relatively stable bandlike parenchymal opacity in the upper lobes with areas of irregular masslike consolidation with associated punctate calcifications. There is subpleural fibrotic change in the right upper lobe adjacent to the parenchymal consolidation. There are similar additional scattered bilateral nodules, the majority of which are calcified. Evaluation of the lung bases is compromised by respiratory motion artifact. Subtle interstitial/ground-glass changes in the right lower lobe are redemonstrated. Interval development of mild pulmonary vascular congestion and mild to moderate right pleural effusion. Small left pleural effusion. Lorraine Joann Upper Abdomen: No acute findings when taking into account respiratory motion artifact. Partially visualized small left renal cyst. Soft Tissues/Bones: Again shown are changes of ankylosing spondylitis in the visualized thoracic spine. Stable heterogeneous sclerosis adjacent to the endplates at Y7-M7, Q03-O32, sternum, and posterior elements in the upper thoracic spine. Degenerative changes of visualized lower cervical spine and L1-L2. Posterior paraspinal muscle atrophy is again noted. Similar fibrotic and nodular parenchymal opacities compatible with history of silicosis and stable pulmonary fibrosis. Interval development of mild pulmonary vascular congestion and mild to moderate right pleural effusion.  Small

## 2023-07-13 NOTE — CARE COORDINATION
Case Management Assessment  Initial Evaluation    Date/Time of Evaluation: 7/13/2023 9:57 AM  Assessment Completed by: Aylin Rogers RN    If patient is discharged prior to next notation, then this note serves as note for discharge by case management. Patient Name: Rachel Lal                   YOB: 1950  Diagnosis: Atrial fibrillation ([address]) [I48.91]  New onset atrial fibrillation ([address]) [I48.91]                   Date / Time: 7/12/2023  2:10 PM    Patient Admission Status: Inpatient   Readmission Risk (Low < 19, Mod (19-27), High > 27): Readmission Risk Score: 11.2    Current PCP: MONSERRAT Sanchez MD  PCP verified by CM? Yes    Chart Reviewed: Yes      History Provided by: Patient  Patient Orientation: Alert and Oriented    Patient Cognition: Alert    Hospitalization in the last 30 days (Readmission):  No    If yes, Readmission Assessment in CM Navigator will be completed. Advance Directives:      Code Status: Full Code   Patient's Primary Decision Maker is:      Primary Decision MakerElmatthew Houser Spouse - [phone]    Discharge Planning:    Patient lives with: Spouse/Significant Other (Adult Son) Type of Home: House  Primary Care Giver: Self  Patient Support Systems include: Spouse/Significant Other   Current Financial resources: Medicare  Current community resources: None  Current services prior to admission: Durable Medical Equipment, C-pap            Current DME: Other (Comment), Cpap (CPAP- [employer], Nebulizer)            Type of Home Care services:  None    ADLS  Prior functional level: Independent in ADLs/IADLs  Current functional level: Independent in ADLs/IADLs    PT AM-PAC:   /24  OT AM-PAC:   /24    Family can provide assistance at DC: Yes  Would you like Case Management to discuss the discharge plan with any other family members/significant others, and if so, who?  No  Plans to Return to Present Housing: Yes  Other Identified Issues/Barriers to RETURNING to current

## 2023-07-13 NOTE — PROGRESS NOTES
[facility]   Occupational Therapy Evaluation  Date: 23  Patient Name: Alba Diggs       Room:   MRN: [id number]  Account: [de-identified]   : 1950  (78 y.o.) Gender: male     Discharge Recommendations: The patient's needs are being met with no further Occupational Therapy recommended at discharge. Referring Practitioner: Harish Guillen MD  Diagnosis: Atrial fibrillation Additional Pertinent Hx: Per MD note: My patient is a 68years old male with Hypertension, T2DM on Metformin, Scoliosis, Asthma, Hx of STEMI with stent since 2017 on dual antiplatelet therapy, with presented to ED with Atrial fibrillation found on EKG done during his outpatient visit to His Pulmonologist.Patient explains he has scoliosis and asthma and was experiencing increased shortness of breath due to air smoke for a week and scheduled an appointment to get checked where he was found in Atrial fibrillation. He does not experience any chest pain, dizziness, Syncopal episodes or palpitations. Past Medical History:  has a past medical history of Ankylosing spondylitis ([address]), Arthritis, Asthma not dependent on systemic steroids without complication, CAD (coronary artery disease), Hypertension, DEB treated with BiPAP, Pulmonary hypertension ([address]), Silicosis ([address]), and STEMI (ST elevation myocardial infarction) ([address]). Past Surgical History:   has a past surgical history that includes joint replacement and Coronary angioplasty with stent (2017). Restrictions  Restrictions/Precautions  Restrictions/Precautions: Fall Risk;General Precautions; Up as Tolerated  Required Braces or Orthoses?: No  Implants present? : Metal implants (R DANIEL, cardiac stent)  Position Activity Restriction  Other position/activity restrictions: OT/PT eval and treat      Vitals  Vitals  Pulse: 92  Heart Rate Source: Monitor  BP: 129/79  BP Location: Right upper arm  BP Method: Automatic  Patient Position: Learning: None  Education Outcome: Verbalized understanding, Demonstrated understanding    Functional Outcome Measures  AM-PAC Daily Activity - Inpatient   How much help is needed for putting on and taking off regular lower body clothing?: None  How much help is needed for bathing (which includes washing, rinsing, drying)?: None  How much help is needed for toileting (which includes using toilet, bedpan, or urinal)?: None  How much help is needed for putting on and taking off regular upper body clothing?: None  How much help is needed for taking care of personal grooming?: None  How much help for eating meals?: None  AM-[facility] Inpatient Daily Activity Raw Score: 24  AM-PAC Inpatient ADL T-Scale Score : 57.54  ADL Inpatient CMS 0-100% Score: 0  ADL Inpatient CMS G-Code Modifier : [facility]    Plan  Occupational Therapy Plan  Specific Instructions for Next Treatment: D/C OT    OT Individual Minutes  OT Individual Minutes  Time In: 1324  Time Out: [address]  Minutes: 14  Time Code Minutes   Timed Code Treatment Minutes: 7 Minutes    Electronically signed by DILIP Valentin on 7/13/23 at 2:21 PM EDT

## 2023-07-13 NOTE — PLAN OF CARE
Problem: Discharge Planning  Goal: Discharge to home or other facility with appropriate resources  7/13/2023 0506 by Donis Ferreira RN  Outcome: Progressing     Problem: Safety - Adult  Goal: Free from fall injury  7/13/2023 0506 by Donis Ferreira RN  Outcome: Progressing     Problem: ABCDS Injury Assessment  Goal: Absence of physical injury  7/13/2023 0506 by Donis Ferreira RN  Outcome: Progressing     Problem: Pain  Goal: Verbalizes/displays adequate comfort level or baseline comfort level  Outcome: Progressing

## 2023-07-13 NOTE — PLAN OF CARE
Problem: Discharge Planning  Goal: Discharge to home or other facility with appropriate resources  7/13/2023 1657 by Garcia Jo RN  Outcome: Progressing     Problem: Safety - Adult  Goal: Free from fall injury  7/13/2023 1657 by Garcia Jo RN  Outcome: Progressing     Problem: ABCDS Injury Assessment  Goal: Absence of physical injury  7/13/2023 1657 by Garcia Jo RN  Outcome: Progressing     Problem: Pain  Goal: Verbalizes/displays adequate comfort level or baseline comfort level  7/13/2023 1657 by Garcia Jo RN  Outcome: Progressing

## 2023-07-13 NOTE — CONSULTS
[facility]  In Patient Cardiology Consult             Date:   7/13/2023  Patient name: Josie Shane  Date of admission:  7/12/2023  2:10 PM  MRN:   [id number]  YOB: 1950    Reason for Admission: Atrial fibrillation    CHIEF COMPLAINT: Atrial fibrillation    History Obtained From: The patient and chart    HISTORY OF PRESENT ILLNESS:    This is a 42-year-old male. He was at his pulmonologist office. Was not feeling well. Found to have a tachycardic plus irregular rhythm. Sent for EKG. EKG came back showing atrial fibrillation. This is a new diagnosis for him. He came into the emergency room. Was admitted for A-fib with RVR. He got few doses of IV Cardizem. Currently he is in A-fib with RVR. Heart rate of 1 15-1 20. He denies any chest pains. He denies any shortness of breath. He does have a known history of coronary artery disease status post PCI as described below. Past Medical History:    Past Medical History:   Diagnosis Date    Ankylosing spondylitis ([address])     Arthritis     Asthma not dependent on systemic steroids without complication 07/85/7260    CAD (coronary artery disease)     Hypertension     DEB treated with BiPAP 12/07/2017    Pulmonary hypertension ([address]) 76/20/7634    Silicosis ([address]) 97/0477    STEMI (ST elevation myocardial infarction) ([address]) 12/9/2017         Past Surgical History:    Past Surgical History:   Procedure Laterality Date    CORONARY ANGIOPLASTY WITH STENT PLACEMENT  12/09/2017    JOINT REPLACEMENT      right hip         Home Medications:    No outpatient medications have been marked as taking for the 7/12/23 encounter [facility] Encounter).         Allergies:  Penicillins    Social History:    Social History     Socioeconomic History    Marital status:      Spouse name: None    Number of children: None    Years of education: None    Highest education level: None   Tobacco Use    Smoking status: Former     Packs/day: 0.25     Years: Race                          Room Number  7710            Height:                 71 inch, 180.34 cm      Corporate ID [id number]      Weight:                 309 pounds, 140.2 kg   #      Patient Acct [de-identified]       BSA:        2.54 m^2    BMI:      43.11   #                                                              kg/m^2      MR #         [id number]         Oralia oHlder      Accession #  [id number]       Interpreting Physician  [id number] [facility]      Fellow                       Referring Nurse                                Practitioner      Interpreting Rebekah Manning  Referring Physician     Tiim Machado, *   Fellow     Type of Study      TTE procedure:2D Echocardiogram, M-Mode, Doppler, Color Doppler. Procedure Date  Date: 12/11/2017 Start: 10:32 AM    Study Location: [facility]    Anamika / Aaliyah Tijeras. Comments:  STEMI/ 129/17 PTCA-DAGO to LAD  Previous echo 2/3/16    Patient Status: Inpatient    Height: 71 inches Weight: 309.09 pounds BSA: 2.54 m^2 BMI: 43.11 kg/m^2    Allergies    - Penicillin. CONCLUSIONS    Summary  Technically difficult study. All segments not well seen. Specific wall  motion abnormalities could be missed. Overall LV chamber dimension is normal with mild concentric left ventricular  hypertrophy. Systolic function appears to be within normal limits with an  estimated EF of 50-55%. Grade I (mild) left ventricular diastolic dysfunction. Trivial tricuspid regurgitation with Estimated right ventricular systolic  pressure is 36 mmHg.     Signature  ----------------------------------------------------------------------------   Electronically signed by Oralia Holder) on 12/11/2017 11:38   AM  ----------------------------------------------------------------------------    ----------------------------------------------------------------------------   Electronically signed by Serena YanInterpreting physician) on 12/11/2017   02:07

## 2023-07-13 NOTE — ACP (ADVANCE CARE PLANNING)
Advance Care Planning     Advance Care Planning Activator (Inpatient)  Conversation Note      Date of ACP Conversation: 7/13/2023     Conversation Conducted with: Patient with Decision Making Capacity    ACP Activator: Navin Lima RN        Health Care Decision Maker:     Current Designated Health Care Decision Maker:     Primary Decision Maker: Bebeto Dugan Spouse - [phone]        Care Preferences    Ventilation: \"If you were in your present state of health and suddenly became very ill and were unable to breathe on your own, what would your preference be about the use of a ventilator (breathing machine) if it were available to you? \"      Would the patient desire the use of ventilator (breathing machine)?: yes    \"If your health worsens and it becomes clear that your chance of recovery is unlikely, what would your preference be about the use of a ventilator (breathing machine) if it were available to you? \"     Would the patient desire the use of ventilator (breathing machine)?: \"I Don't Know\"       Resuscitation  \"CPR works best to restart the heart when there is a sudden event, like a heart attack, in someone who is otherwise healthy. Unfortunately, CPR does not typically restart the heart for people who have serious health conditions or who are very sick. \"    \"In the event your heart stopped as a result of an underlying serious health condition, would you want attempts to be made to restart your heart (answer \"yes\" for attempt to resuscitate) or would you prefer a natural death (answer \"no\" for do not attempt to resuscitate)? \" yes       [] Yes   [] No   Educated Patient / Peter Bennett regarding differences between Advance Directives and portable DNR orders.     Length of ACP Conversation in minutes:      Conversation Outcomes:  ACP discussion completed    Follow-up plan:    [] Schedule follow-up conversation to continue planning  [] Referred individual to Provider for additional questions/concerns

## 2023-07-14 VITALS
SYSTOLIC BLOOD PRESSURE: 113 MMHG | RESPIRATION RATE: 18 BRPM | OXYGEN SATURATION: 93 % | TEMPERATURE: 97.8 F | HEART RATE: 95 BPM | DIASTOLIC BLOOD PRESSURE: 76 MMHG | WEIGHT: 275.57 LBS | HEIGHT: 71 IN | BODY MASS INDEX: 38.58 KG/M2

## 2023-07-14 PROBLEM — I48.91 NEW ONSET ATRIAL FIBRILLATION (HCC): Status: ACTIVE | Noted: 2023-07-14

## 2023-07-14 LAB
ANION GAP SERPL CALCULATED.3IONS-SCNC: 7 MMOL/L (ref 9–17)
BASOPHILS # BLD: 0.1 K/UL (ref 0–0.2)
BASOPHILS NFR BLD: 1 % (ref 0–2)
BUN SERPL-MCNC: 15 MG/DL (ref 8–23)
CALCIUM SERPL-MCNC: 9.3 MG/DL (ref 8.6–10.4)
CHLORIDE SERPL-SCNC: 100 MMOL/L (ref 98–107)
CO2 SERPL-SCNC: 28 MMOL/L (ref 20–31)
CREAT SERPL-MCNC: 0.7 MG/DL (ref 0.7–1.2)
EKG ATRIAL RATE: 120 BPM
EKG Q-T INTERVAL: 364 MS
EKG Q-T INTERVAL: 370 MS
EKG QRS DURATION: 98 MS
EKG QRS DURATION: 98 MS
EKG QTC CALCULATION (BAZETT): 471 MS
EKG QTC CALCULATION (BAZETT): 493 MS
EKG R AXIS: -47 DEGREES
EKG R AXIS: -50 DEGREES
EKG T AXIS: 38 DEGREES
EKG T AXIS: 78 DEGREES
EKG VENTRICULAR RATE: 101 BPM
EKG VENTRICULAR RATE: 107 BPM
EOSINOPHIL # BLD: 0.3 K/UL (ref 0–0.4)
EOSINOPHILS RELATIVE PERCENT: 3 % (ref 0–4)
ERYTHROCYTE [DISTWIDTH] IN BLOOD BY AUTOMATED COUNT: 16.8 % (ref 11.5–14.9)
GFR SERPL CREATININE-BSD FRML MDRD: >60 ML/MIN/1.73M2
GLUCOSE BLD-MCNC: 165 MG/DL (ref 75–110)
GLUCOSE BLD-MCNC: 196 MG/DL (ref 75–110)
GLUCOSE SERPL-MCNC: 179 MG/DL (ref 70–99)
HCT VFR BLD AUTO: 38 % (ref 41–53)
HGB BLD-MCNC: 12.3 G/DL (ref 13.5–17.5)
LYMPHOCYTES # BLD: 14 % (ref 24–44)
LYMPHOCYTES NFR BLD: 1.1 K/UL (ref 1–4.8)
MCH RBC QN AUTO: 24.4 PG (ref 26–34)
MCHC RBC AUTO-ENTMCNC: 32.3 G/DL (ref 31–37)
MCV RBC AUTO: 75.6 FL (ref 80–100)
MONOCYTES NFR BLD: 1 K/UL (ref 0.1–1.3)
MONOCYTES NFR BLD: 13 % (ref 1–7)
NEUTROPHILS NFR BLD: 69 % (ref 36–66)
NEUTS SEG NFR BLD: 5.2 K/UL (ref 1.3–9.1)
PLATELET # BLD AUTO: 209 K/UL (ref 150–450)
PMV BLD AUTO: 8.1 FL (ref 6–12)
POTASSIUM SERPL-SCNC: 4.5 MMOL/L (ref 3.7–5.3)
RBC # BLD AUTO: 5.03 M/UL (ref 4.5–5.9)
SODIUM SERPL-SCNC: 135 MMOL/L (ref 135–144)
WBC OTHER # BLD: 7.6 K/UL (ref 3.5–11)

## 2023-07-14 PROCEDURE — 82947 ASSAY GLUCOSE BLOOD QUANT: CPT

## 2023-07-14 PROCEDURE — 6370000000 HC RX 637 (ALT 250 FOR IP): Performed by: INTERNAL MEDICINE

## 2023-07-14 PROCEDURE — 80048 BASIC METABOLIC PNL TOTAL CA: CPT

## 2023-07-14 PROCEDURE — 85027 COMPLETE CBC AUTOMATED: CPT

## 2023-07-14 PROCEDURE — 6370000000 HC RX 637 (ALT 250 FOR IP)

## 2023-07-14 PROCEDURE — 99239 HOSP IP/OBS DSCHRG MGMT >30: CPT | Performed by: INTERNAL MEDICINE

## 2023-07-14 PROCEDURE — 94640 AIRWAY INHALATION TREATMENT: CPT

## 2023-07-14 PROCEDURE — 6360000002 HC RX W HCPCS

## 2023-07-14 PROCEDURE — 36415 COLL VENOUS BLD VENIPUNCTURE: CPT

## 2023-07-14 PROCEDURE — 94660 CPAP INITIATION&MGMT: CPT

## 2023-07-14 PROCEDURE — 99233 SBSQ HOSP IP/OBS HIGH 50: CPT | Performed by: INTERNAL MEDICINE

## 2023-07-14 PROCEDURE — 2580000003 HC RX 258

## 2023-07-14 PROCEDURE — 93010 ELECTROCARDIOGRAM REPORT: CPT | Performed by: INTERNAL MEDICINE

## 2023-07-14 PROCEDURE — 94761 N-INVAS EAR/PLS OXIMETRY MLT: CPT

## 2023-07-14 RX ORDER — DILTIAZEM HYDROCHLORIDE 120 MG/1
120 CAPSULE, COATED, EXTENDED RELEASE ORAL DAILY
Qty: 30 CAPSULE | Refills: 3 | Status: SHIPPED | OUTPATIENT
Start: 2023-07-15 | End: 2023-08-14 | Stop reason: SDUPTHER

## 2023-07-14 RX ADMIN — ASPIRIN 81 MG: 81 TABLET, CHEWABLE ORAL at 07:43

## 2023-07-14 RX ADMIN — TAMSULOSIN HYDROCHLORIDE 0.4 MG: 0.4 CAPSULE ORAL at 07:43

## 2023-07-14 RX ADMIN — LISINOPRIL 10 MG: 10 TABLET ORAL at 07:43

## 2023-07-14 RX ADMIN — ATORVASTATIN CALCIUM 80 MG: 80 TABLET, FILM COATED ORAL at 07:43

## 2023-07-14 RX ADMIN — SODIUM CHLORIDE, PRESERVATIVE FREE 10 ML: 5 INJECTION INTRAVENOUS at 07:50

## 2023-07-14 RX ADMIN — ENOXAPARIN SODIUM 120 MG: 150 INJECTION SUBCUTANEOUS at 07:42

## 2023-07-14 RX ADMIN — DULOXETINE HYDROCHLORIDE 60 MG: 60 CAPSULE, DELAYED RELEASE ORAL at 07:43

## 2023-07-14 RX ADMIN — BUDESONIDE AND FORMOTEROL FUMARATE DIHYDRATE 2 PUFF: 160; 4.5 AEROSOL RESPIRATORY (INHALATION) at 07:46

## 2023-07-14 RX ADMIN — METOPROLOL SUCCINATE 50 MG: 50 TABLET, EXTENDED RELEASE ORAL at 07:43

## 2023-07-14 RX ADMIN — DILTIAZEM HYDROCHLORIDE 120 MG: 120 CAPSULE, COATED, EXTENDED RELEASE ORAL at 07:43

## 2023-07-14 ASSESSMENT — ENCOUNTER SYMPTOMS
NAUSEA: 0
SHORTNESS OF BREATH: 0
ABDOMINAL PAIN: 0
COUGH: 0
VOMITING: 0

## 2023-07-14 NOTE — DISCHARGE SUMMARY
pleural effusions are seen. Pulmonary vascularity appears normal. There is mild ectasia of the thoracic aorta. There are degenerative changes in the spine. Old left rib fractures. No acute bony abnormalities. The hilar structures are normal.     No acute cardiopulmonary disease. No significant change from prior exam     CT CHEST PULMONARY EMBOLISM W CONTRAST    Result Date: 7/13/2023  EXAMINATION: CTA OF THE CHEST 7/13/2023 6:16 pm TECHNIQUE: CTA of the chest was performed after the administration of intravenous contrast.  Multiplanar reformatted images are provided for review. MIP images are provided for review. Automated exposure control, iterative reconstruction, and/or weight based adjustment of the mA/kV was utilized to reduce the radiation dose to as low as reasonably achievable. COMPARISON: 07/12/2023, 12/09/2022, 02/10/2021 HISTORY: ORDERING SYSTEM PROVIDED HISTORY: elevated d-dimer, r/o PE TECHNOLOGIST PROVIDED HISTORY: elevated d-dimer, r/o PE Reason for Exam: sob Additional signs and symptoms: pt unable to keep arms up FINDINGS: Pulmonary Arteries: Pulmonary arteries show no evidence of intraluminal filling defect to suggest pulmonary embolism. Pulmonary trunk is normal in caliber. Mediastinum: No evidence of mediastinal lymphadenopathy. The heart and pericardium demonstrate no acute abnormality. There is no acute abnormality of the thoracic aorta. Lungs/pleura: Small to moderate right pleural effusion and trace left pleural effusion unchanged. Thick nodular bandlike densities across the posterior segment upper lobes on both sides date back to 2021 representing scarring. There is some dependent edema of the lung bases manifested hazy ground-glass attenuation in the lower lobes. Scattered calcified nodules, subcentimeter, similar to prior. No dominant nodule or mass. No pneumothorax. Upper Abdomen: No acute process. Small left renal cyst. Soft Tissues/Bones: Diffuse degenerative changes.   Apparent CONTINUE taking these medications      albuterol (2.5 MG/3ML) 0.083% nebulizer solution  Commonly known as: PROVENTIL     aspirin 81 MG chewable tablet  Take 1 tablet by mouth daily     blood glucose test strips  Test 1 time a day & as needed for symptoms of irregular blood glucose. dicyclomine 10 MG capsule  Commonly known as: BENTYL  Take 1 capsule by mouth 4 times daily as needed (diarrhea)     DULoxetine 60 MG extended release capsule  Commonly known as: CYMBALTA  Take 2 capsules by mouth daily     Fluticasone furoate-vilanterol 200-25 MCG/INH Aepb inhaler  Commonly known as: BREO ELLIPTA     Lancets Misc  1 each by Does not apply route 2 times daily     lisinopril 10 MG tablet  Commonly known as: PRINIVIL;ZESTRIL  TAKE ONE TABLET BY MOUTH DAILY     loratadine 10 MG tablet  Commonly known as: CLARITIN     metoprolol succinate 50 MG extended release tablet  Commonly known as: TOPROL XL  Take 1 tablet by mouth daily     montelukast 10 MG tablet  Commonly known as: SINGULAIR     nitroGLYCERIN 0.4 MG SL tablet  Commonly known as: NITROSTAT  Place 1 tablet under the tongue every 5 minutes as needed for Chest pain up to max of 3 total doses. If no relief after 1 dose, call 911.      ondansetron 4 MG tablet  Commonly known as: ZOFRAN  Take 1 tablet by mouth 3 times daily as needed for Nausea or Vomiting     tamsulosin 0.4 MG capsule  Commonly known as: FLOMAX  TAKE ONE CAPSULE BY MOUTH DAILY            STOP taking these medications      amLODIPine 2.5 MG tablet  Commonly known as: NORVASC     Brilinta 60 MG Tabs tablet  Generic drug: ticagrelor     meloxicam 15 MG tablet  Commonly known as: MOBIC               Where to Get Your Medications        These medications were sent to [facility] [id number] [place], [place] - [address] [place] [address] -  [phone]  [address] [place] [address], [address] [place] [address]      Phone: [phone]   apixaban 5 MG Tabs tablet  dilTIAZem 120 MG extended release capsule         Time Spent on

## 2023-07-14 NOTE — PROGRESS NOTES
BRONCHOSPASM/BRONCHOCONSTRICTION     [x]         IMPROVE AERATION/BREATH SOUNDS  [x]   ADMINISTER BRONCHODILATOR THERAPY AS APPROPRIATE  [x]   ASSESS BREATH SOUNDS  [x]   IMPLEMENT AEROSOL/MDI PROTOCOL  [x]   PATIENT EDUCATION AS NEEDED   PROVIDE ADEQUATE OXYGENATION WITH ACCEPTABLE SP02/ABG'S    [x]  IDENTIFY APPROPRIATE OXYGEN THERAPY  [x]   MONITOR SP02/ABG'S AS NEEDED   [x]   PATIENT EDUCATION AS NEEDED   NONINVASIVE VENTILATION    PROVIDE OPTIMAL VENTILATION/ACCEPTABLE SPO2   IMPLEMENT NONINVASIVE VENTILATION PROTOCOL   MAINTAIN ACCEPTABLE SPO2   ASSESS SKIN INTEGRITY/BREAKDOWN SCORE   PATIENT EDUCATION AS NEEDED   BIPAP AS NEEDED       Pt currently off bipap however pt did wear overnight. Pt remains on room air SpO2 94%. Diminished expiratory wheeze in upper lobes.  Pt in no distress at this time

## 2023-07-14 NOTE — DISCHARGE INSTR - DIET
Good nutrition is important when healing from an illness, injury, or surgery. Follow any nutrition recommendations given to you during your hospital stay. If you were given an oral nutrition supplement while in the hospital, continue to take this supplement at home. You can take it with meals, in-between meals, and/or before bedtime. These supplements can be purchased at most local grocery stores, pharmacies, and chain [employer]ores. If you have any questions about your diet or nutrition, call the hospital and ask for the dietitian.     Regular diet 4 carb choices low sodium

## 2023-07-14 NOTE — CARE COORDINATION
ONGOING DISCHARGE PLAN:    Patient is alert and oriented x4. Spoke with patient regarding discharge plan and patient confirms that plan is still home. Requested eliquis to be e-scribed to check cost. 30 day free card given to patient. Cardiology consult    Echo-ef 30%      IMM letter provided to patient. Patient offered four hours to make informed decision regarding appeal process; patient agreeable to discharge. Will continue to follow for additional discharge needs. If patient is discharged prior to next notation, then this note serves as note for discharge by case management.     Electronically signed by Celio Cerda RN on 7/14/2023 at 12:12 PM

## 2023-07-14 NOTE — PLAN OF CARE
Problem: Discharge Planning  Goal: Discharge to home or other facility with appropriate resources  7/14/2023 0339 by Radha Monterroso RN  Outcome: Progressing     Problem: Safety - Adult  Goal: Free from fall injury  7/14/2023 0339 by Radha Monterroso RN  Outcome: Progressing     Problem: ABCDS Injury Assessment  Goal: Absence of physical injury  7/14/2023 0339 by Radha Monterroso RN  Outcome: Progressing     Problem: Pain  Goal: Verbalizes/displays adequate comfort level or baseline comfort level  7/14/2023 0339 by Radha Monterroso RN  Outcome: Progressing

## 2023-07-14 NOTE — PROGRESS NOTES
[address]    PROGRESS NOTE             7/14/2023    10:43 AM    Name:   Clakre Cao  MRN:     [id number]     Acct:      [de-identified]   Room:   2118/2118[facility] Day:  2  Admit Date:  7/12/2023  2:10 PM    PCP:  Donna Espinal MD  Code Status:  Full Code    Subjective:     C/C:   Chief Complaint   Patient presents with    Atrial Fibrillation    Shortness of Breath     Interval History Status: not changed. Patient examined at bedside this morning. He appears to be comfortable but continues to be in A-fib with heart rate in the 80s. He has no current chest pain or shortness of breath but again states that his shortness of breath is worse with activity, such as walking. He notes that he has had low energy for the past few weeks as a result of his shortness of breath that worsens with activity. He denies any headaches, fevers, abdominal pain, nausea, vomiting, swelling. Denies any cough or wheezing. Echocardiogram showed EF of less than 30%, cardiology started him on diltiazem 120 mg p.o. Will be able to discharge once cleared by cardiology. Patient states that he is ready to return to home. Brief History:     68years old male with Hypertension, T2DM on Metformin, silicosis, Asthma, Hx of STEMI with stent since 2017 on dual antiplatelet therapy, with presented to ED with Atrial fibrillation found on EKG done during his outpatient visit to His Pulmonologist.Patient explains he has silicosis and asthma and was experiencing increased shortness of breath due to air smoke for a week and scheduled an appointment to get checked where he was found in Atrial fibrillation. He does not experience any chest pain, dizziness, Syncopal episodes or palpitations. Ed course is significant for Atrial Fibrillation on EKG, he is given one time dose of diltiazem, which brought HR down to 90s , One dose Lovenox given.  Ddimers 0.7, Trop 21,

## 2023-07-14 NOTE — PROGRESS NOTES
Nutrition Assessment     Type and Reason for Visit: Positive Nutrition Screen (weight loss)    Nutrition Recommendations/Plan:   Continue diet as ordered. Malnutrition Assessment:  Malnutrition Status: No malnutrition    Nutrition Assessment:  Pt states \"I just don't eat the way I use to, my highest weight was 330#. \"  Pt relates appetite is good and was complimentary of the food he has received. Current Nutrition Therapies:    ADULT DIET; Regular; 4 carb choices (60 gm/meal);  No Added Salt (3-4 gm)    Anthropometric Measures:  Height: 5' 11\" (180.3 cm)  Current Body Wt: 275 lb (124.7 kg)   BMI: 38.4    Nutrition Diagnosis:   Overweight/Obese related to excessive energy intake as evidenced by BMI    Nutrition Interventions:   Food and/or Nutrient Delivery: Continue Current Diet  Nutrition Education/Counseling: No recommendation at this time  Coordination of Nutrition Care: Continue to monitor while inpatient       Goals:     Goals: PO intake 75% or greater       Nutrition Monitoring and Evaluation:   Behavioral-Environmental Outcomes: None Identified  Food/Nutrient Intake Outcomes: Food and Nutrient Intake  Physical Signs/Symptoms Outcomes: Biochemical Data, Nutrition Focused Physical Findings, Skin, Weight    Discharge Planning:    Continue current diet     Natalya Crawford, [address] [facility]

## 2023-07-14 NOTE — DISCHARGE INSTRUCTIONS
Follow up with your primary care physician, Dr. Sree Ocampo, in 7 days  Follow up with Dr. Andrew Torres (Cardiology) in 3 days  Take all your medication as prescribed:  Take 1 tablet of Eliquis (apixaban) 5 mg twice daily  Take 1 capsule of Cardizem CDC (diltiazem) 120 mg once daily  If your prescription has refills, obtain the medication refills from the pharmacy before you run out. Seek medical attention if you run out of a medication you need and do not have any refills of.   Reasons to call your doctor or go to the ER: chest pain, palpitations, shortness of breath or any other concerning symptoms.

## 2023-07-14 NOTE — CARE COORDINATION
Writer called [address] and spoke with Sharita Villafana. Rx is $47.00 per month. Will discuss with patient to see if affordable. Electronically signed by Celio Cerda RN on 7/14/2023 at 3:19 PM     Attempted to update patient on monthly cost of eliquis, however, patient was already gone from facility.  Electronically signed by Celio Cerda RN on 7/14/2023 at 3:33 PM

## 2023-07-17 ENCOUNTER — CARE COORDINATION (OUTPATIENT)
Dept: CASE MANAGEMENT | Age: 73
End: 2023-07-17

## 2023-07-17 ENCOUNTER — TELEPHONE (OUTPATIENT)
Dept: FAMILY MEDICINE CLINIC | Age: 73
End: 2023-07-17

## 2023-07-17 NOTE — TELEPHONE ENCOUNTER
----- Message from Clem aGspar RN sent at 7/17/2023 10:50 AM EDT -----  Pt will need 7 day hospital follow up appointment.   He was discharged from [facility] on 7/15/23 for Afib with RVR  Thank you

## 2023-07-17 NOTE — CARE COORDINATION
[facility] Care Transitions Initial Follow Up Call    Call within 2 business days of discharge: Yes    Patient: Valentine Rose Patient : 1950   MRN: [id number]  Reason for Admission: New onset atrial fibrillation   Discharge Date: 23 RARS: Readmission Risk Score: 10.1      Last Discharge [address] [facility],[address]Th [facility]       Date Complaint Diagnosis Description Type Department Provider    23 Atrial Fibrillation; Shortness of Breath New onset atrial fibrillation [facility]) ED to Hosp-Admission (Discharged) (ADMITTED) Anh Zazueta MD; Krystyna Elizabeth. .. Was this an external facility discharge? No Discharge Facility: [facility] to be reviewed by the provider   Additional needs identified to be addressed with provider: Yes  7 day hospital follow up appointment               Method of communication with provider: staff message. 1st attempt to reach patient for Care Transitions. [facility] requesting return call. Contact information provided.   [phone]      Non-face-to-face services provided:  Obtained and reviewed discharge summary and/or continuity of care documents        Care Transitions 24 Hour Call    Care Transitions Interventions         Follow Up  Future Appointments   Date Time Provider [address]   8/15/2023 10:30 AM Jessy Osler, APRN - CNP AFL TCC [facility] C   2023  1:00 PM MD Susi Narvaez [facility]   2023  2:45 PM MD Susi Narvaez RN

## 2023-07-18 ENCOUNTER — CARE COORDINATION (OUTPATIENT)
Dept: CASE MANAGEMENT | Age: 73
End: 2023-07-18

## 2023-07-18 NOTE — CARE COORDINATION
Care Transitions Outreach Attempt #2    Call within 2 business days of discharge: Yes   Attempt #2 to reach patient for transitions of care follow up. Unable to reach patient. Left message to pt and spouse requesting call back. CTN sign off if no return call received. Patient: Tameka Valdez Patient : 1950 MRN: [id number]    Last Discharge [address] [facility],[address] Floor       Date Complaint Diagnosis Description Type Department Provider    23 Atrial Fibrillation; Shortness of Breath New onset atrial fibrillation [facility]) ED to Hosp-Admission (Discharged) (ADMITTED) Angel Call MD; Justin Neumann. .. Was this an external facility discharge? No Discharge Facility: [facility]    Noted following upcoming appointments from discharge chart review:   [facility] follow up appointment(s):   Future Appointments   Date Time Provider [address]   8/15/2023 10:30 AM JULIA Parker - CNP AFL TCC [facility] AFL [facility] C   2023  1:00 PM Carole Dominguez MD [facility]   2023  2:45 PM Carole Dominguez MD [facility] [address], RN BSN [facility]  Care Transitions Nurse  [phone]   [email or web address]

## 2023-07-18 NOTE — PROGRESS NOTES
Physician Progress Note      PATIENTDemi Pierce  CSN #:                  [id number]  :                       1950  ADMIT DATE:       2023 2:10 PM  [address] [place] [address] DATE:        2023 3:30 PM  RESPONDING  PROVIDER #:        Debbie Reddy MD          QUERY TEXT:    Patient admitted with Atrial Fibrillation. Pt noted to have BPH and was   treated with Flomax. ?If possible, please document in progress notes and   discharge summary if you are evaluating and/or treating any of the following: The medical record reflects the following:  Risk Factors: Age, Male, BPH  Clinical Indicators: Per H&P \"Benign prostatic Hyperplasia\"  Treatment: -Flomax 0.4 mg  Options provided:  -- BPH with partial/complete urinary obstruction  -- BPH with urinary retention without obstruction  -- Other - I will add my own diagnosis  -- Disagree - Not applicable / Not valid  -- Disagree - Clinically unable to determine / Unknown  -- Refer to Clinical Documentation Reviewer    PROVIDER RESPONSE TEXT:    This patient has BPH with urinary retention without obstruction.     Query created by: El Soriano on 2023 11:58 AM      Electronically signed by:  Debbie Reddy MD 2023 3:03 PM

## 2023-07-19 ENCOUNTER — CARE COORDINATION (OUTPATIENT)
Dept: CASE MANAGEMENT | Age: 73
End: 2023-07-19

## 2023-07-19 DIAGNOSIS — I48.91 NEW ONSET ATRIAL FIBRILLATION (HCC): Primary | ICD-10-CM

## 2023-07-19 PROCEDURE — 1111F DSCHRG MED/CURRENT MED MERGE: CPT | Performed by: INTERNAL MEDICINE

## 2023-07-19 NOTE — CARE COORDINATION
[facility] Care Transitions Initial Follow Up Call    Call within 2 business days of discharge: No    Patient Current Location:  Home: [address] [place] [address] S    Care Transition Nurse contacted the patient by telephone to perform post hospital discharge assessment. Verified name and  with patient as identifiers. Provided introduction to self, and explanation of the Care Transition Nurse role. Patient: Rachel Lal Patient : 1950   MRN: [id number]  Reason for Admission: New onset of atrial fib  Discharge Date: 23 RARS: Readmission Risk Score: 10.1      Last Discharge [address] [facility],6Th Floor       Date Complaint Diagnosis Description Type Department Provider    23 Atrial Fibrillation; Shortness of Breath New onset atrial fibrillation [facility]) ED to Hosp-Admission (Discharged) (ADMITTED) Kala Herr MD; Adelina Billy. .. Was this an external facility discharge? No Discharge Facility: [facility] to be reviewed by the provider   Additional needs identified to be addressed with provider: No  none               Method of communication with provider: none. Received call from [address] [facility] [facility]. He stated that he was returning writers call. He stated that he was feeling \"fine\". He said that he went out and bought a heart monitor and that his heart rate has been in the 60's-70's. He said that his breathing was much improved, no dizziness/lightheadedness and did not have any heart palpitations or pain. He said that the only issue that he was having was his arthritic pain was worse. Medications reviewed and he stated that he had all his medications. Reviewed stopped meds. He said that he did call and make his cardio appointment and it was scheduled for 8/15/23. He will be calling his PCP and will hopefully be able to get in this week or next. He had no questions or concerns. Care Transition Nurse reviewed discharge instructions with patient who verbalized understanding.  The

## 2023-07-22 DIAGNOSIS — F41.1 GAD (GENERALIZED ANXIETY DISORDER): ICD-10-CM

## 2023-07-24 RX ORDER — BUSPIRONE HYDROCHLORIDE 10 MG/1
TABLET ORAL
Qty: 180 TABLET | Refills: 1 | Status: SHIPPED | OUTPATIENT
Start: 2023-07-24

## 2023-07-24 NOTE — TELEPHONE ENCOUNTER
Last visit: 05/03/2023  Last Med refill: 04/24/2023  Does patient have enough medication for 72 hours: No:     Next Visit Date:  Future Appointments   Date Time Provider [address]   8/15/2023 10:30 AM Sukhi Turner, APRN - CNP AFL TCC [facility] C   9/7/2023  1:00 PM Carole Baca MD [facility]   9/27/2023  2:45 PM Carole Baca MD [address] Maintenance   Topic Date Due    Hepatitis C screen  Never done    Shingles vaccine (1 of 2) Never done    Diabetic foot exam  09/15/2022    Lipids  03/23/2023    Flu vaccine (1) 08/01/2023    Annual Wellness Visit (AWV)  09/27/2023    A1C test (Diabetic or Prediabetic)  12/27/2023    Depression Monitoring  01/03/2024    Diabetic retinal exam  03/13/2024    Colorectal Cancer Screen  03/25/2024    Diabetic Alb to Cr ratio (uACR) test  05/03/2024    GFR test (Diabetes, CKD 3-4, OR last GFR 15-59)  07/14/2024    DTaP/Tdap/Td vaccine (2 - Td or Tdap) 09/26/2032    Pneumococcal 65+ years Vaccine  Completed    COVID-19 Vaccine  Completed    AAA screen  Completed    Hepatitis A vaccine  Aged Out    Hib vaccine  Aged Out    Meningococcal (ACWY) vaccine  Aged Out    Prostate Specific Antigen (PSA) Screening or Monitoring  Discontinued       Hemoglobin A1C (%)   Date Value   12/27/2022 8.1   09/26/2022 9.0   05/18/2022 8.1             ( goal A1C is < 7)   No components found for: LABMICR  LDL Cholesterol (mg/dL)   Date Value   03/23/2022 59   12/16/2020 39       (goal LDL is <100)   AST (U/L)   Date Value   04/20/2022 14     ALT (U/L)   Date Value   04/20/2022 22     BUN (mg/dL)   Date Value   07/14/2023 15     BP Readings from Last 3 Encounters:   07/14/23 113/76   05/03/23 130/72   04/05/23 104/68          (goal 120/80)    All Future Testing planned in [facility]  Lab Frequency Next Occurrence   Hemoglobin A1C Once 05/03/2023   Lipid, Fasting Once 05/03/2023   Comprehensive Metabolic Panel Once 69/66/1977   PSA Screening Once

## 2023-07-26 ENCOUNTER — CARE COORDINATION (OUTPATIENT)
Dept: CASE MANAGEMENT | Age: 73
End: 2023-07-26

## 2023-07-26 NOTE — CARE COORDINATION
[facility][1] Care Transitions Follow Up Call        Patient: Gelacio Salazar  Patient : 1950   MRN: [id number]  Reason for Admission: New onset of atrial fib  Discharge Date: 23 RARS: Readmission Risk Score: 10.1      Needs to be reviewed by the provider   Additional needs identified to be addressed with provider: No  none             Method of communication with provider: none. 1st attempt to reach patient for Care Transitions. No answer and no voice mail. Will attempt to contact at a later time/date      Follow Up  Future Appointments   Date Time Provider [address]   8/15/2023 10:30 AM JULIA Kirby - CNP AFL TCC [facility] C   2023  1:00 PM Carole Alonso MD [facility]   2023  2:45 PM Carole Alonso MD [address] Transitions Subsequent and Final Call    Subsequent and Final Calls  Care Transitions Interventions  Other Interventions:             Plan for next call:  follow up on Afib and symptoms.     Rachelle Nova RN

## 2023-07-27 ENCOUNTER — CARE COORDINATION (OUTPATIENT)
Dept: CASE MANAGEMENT | Age: 73
End: 2023-07-27

## 2023-07-27 NOTE — CARE COORDINATION
Care Transitions Outreach Attempt #2      Attempt #2 to reach patient for transitions of care follow up. Unable to reach patient. Left message to \"Rich\" requesting call back. CTN sign off if no return call received. Noted PCP office left message to patient to scheduled HFU appt. Patient: Cornel Barrera Patient : 1950 MRN: [id number]    Last Discharge [address] [facility],6Th Floor       Date Complaint Diagnosis Description Type Department Provider    23 Atrial Fibrillation; Shortness of Breath New onset atrial fibrillation [facility]) ED to Hosp-Admission (Discharged) (ADMITTED) Kimberley Nicole MD; Richard Blanca. .. Noted following upcoming appointments from discharge chart review:   [id number] [address] [facility] follow up appointment(s):   Future Appointments   Date Time Provider [address]   8/15/2023 10:30 AM JULIA Ramires - CNP AFL TCC [facility] C   2023  1:00 PM Carole Franks MD [facility] FP [facility]   2023  2:45 PM Carole Franks MD [facility], RN BSN [facility]  Care Transitions Nurse  [phone]   [email or web address]

## 2023-08-09 RX ORDER — APIXABAN 5 MG/1
TABLET, FILM COATED ORAL
Qty: 60 TABLET | Refills: 0 | OUTPATIENT
Start: 2023-08-09

## 2023-08-13 DIAGNOSIS — I25.10 CORONARY ARTERY DISEASE INVOLVING NATIVE CORONARY ARTERY OF NATIVE HEART WITHOUT ANGINA PECTORIS: ICD-10-CM

## 2023-08-14 RX ORDER — DILTIAZEM HYDROCHLORIDE 120 MG/1
120 CAPSULE, COATED, EXTENDED RELEASE ORAL DAILY
Qty: 30 CAPSULE | Refills: 3 | Status: SHIPPED | OUTPATIENT
Start: 2023-08-14

## 2023-08-14 RX ORDER — METOPROLOL SUCCINATE 50 MG/1
TABLET, EXTENDED RELEASE ORAL
Qty: 90 TABLET | Refills: 0 | Status: SHIPPED | OUTPATIENT
Start: 2023-08-14 | End: 2023-08-16

## 2023-08-14 NOTE — TELEPHONE ENCOUNTER
Pt is on Vacation in [place], [place]. Medications [facility]. Pharmacy is on file.
05/03/2023   Hepatitis C Antibody Once 05/03/2023               Patient Active Problem List:     Ankylosing spondylitis of multiple sites in spine ([address])     Chronic silicosis ([address])     Moderate persistent asthma without complication     Coronary artery disease involving native coronary artery of native heart without angina pectoris     History of ST elevation myocardial infarction (STEMI)     Class 3 severe obesity due to excess calories with serious comorbidity and body mass index (BMI) of 40.0 to 44.9 in adult ([address])     Moderately severe recurrent major depression ([address])     Eczema of face     Benign prostatic hyperplasia with nocturia     Type 2 diabetes mellitus with other circulatory complication, without long-term current use of insulin (HCC)     Angina pectoris, unspecified     Atherosclerotic heart disease of native coronary artery with unspecified angina pectoris     Atrial fibrillation ([address])     New onset atrial fibrillation ([address])

## 2023-08-15 DIAGNOSIS — F33.2 MODERATELY SEVERE RECURRENT MAJOR DEPRESSION (HCC): ICD-10-CM

## 2023-08-15 DIAGNOSIS — F41.1 GAD (GENERALIZED ANXIETY DISORDER): ICD-10-CM

## 2023-08-15 DIAGNOSIS — I25.10 CORONARY ARTERY DISEASE INVOLVING NATIVE CORONARY ARTERY OF NATIVE HEART WITHOUT ANGINA PECTORIS: ICD-10-CM

## 2023-08-16 RX ORDER — DULOXETIN HYDROCHLORIDE 60 MG/1
CAPSULE, DELAYED RELEASE ORAL
Qty: 180 CAPSULE | Refills: 1 | Status: SHIPPED | OUTPATIENT
Start: 2023-08-16

## 2023-08-16 RX ORDER — METOPROLOL SUCCINATE 50 MG/1
TABLET, EXTENDED RELEASE ORAL
Qty: 90 TABLET | Refills: 0 | Status: SHIPPED | OUTPATIENT
Start: 2023-08-16

## 2023-08-16 NOTE — TELEPHONE ENCOUNTER
Last visit: 05/03/2023  Last Med refill: 05/18/2023  Does patient have enough medication for 72 hours: No:     Next Visit Date:  Future Appointments   Date Time Provider [address]   9/7/2023  1:00 PM Carole Mortensen MD [facility] FP [facility]   9/12/2023 10:45 AM Kristine Causey, APRN - CNP AFL TCC [facility] C   9/27/2023  2:45 PM Carole Mortensen MD [address] Maintenance   Topic Date Due    Hepatitis C screen  Never done    Shingles vaccine (1 of 2) Never done    Diabetic foot exam  09/15/2022    Lipids  03/23/2023    Flu vaccine (1) 08/01/2023    Annual Wellness Visit (AWV)  09/27/2023    A1C test (Diabetic or Prediabetic)  12/27/2023    Depression Monitoring  01/03/2024    Diabetic retinal exam  03/13/2024    Colorectal Cancer Screen  03/25/2024    Diabetic Alb to Cr ratio (uACR) test  05/03/2024    GFR test (Diabetes, CKD 3-4, OR last GFR 15-59)  07/14/2024    DTaP/Tdap/Td vaccine (2 - Td or Tdap) 09/26/2032    Pneumococcal 65+ years Vaccine  Completed    COVID-19 Vaccine  Completed    AAA screen  Completed    Hepatitis A vaccine  Aged Out    Hib vaccine  Aged Out    Meningococcal (ACWY) vaccine  Aged Out    Prostate Specific Antigen (PSA) Screening or Monitoring  Discontinued       Hemoglobin A1C (%)   Date Value   12/27/2022 8.1   09/26/2022 9.0   05/18/2022 8.1             ( goal A1C is < 7)   No components found for: LABMICR  LDL Cholesterol (mg/dL)   Date Value   03/23/2022 59   12/16/2020 39       (goal LDL is <100)   AST (U/L)   Date Value   04/20/2022 14     ALT (U/L)   Date Value   04/20/2022 22     BUN (mg/dL)   Date Value   07/14/2023 15     BP Readings from Last 3 Encounters:   07/14/23 113/76   05/03/23 130/72   04/05/23 104/68          (goal 120/80)    All Future Testing planned in [facility]  Lab Frequency Next Occurrence   Hemoglobin A1C Once 05/03/2023   Lipid, Fasting Once 05/03/2023   Comprehensive Metabolic Panel Once 58/43/5268   PSA Screening Once

## 2023-09-03 DIAGNOSIS — N40.1 BENIGN PROSTATIC HYPERPLASIA WITH NOCTURIA: ICD-10-CM

## 2023-09-03 DIAGNOSIS — R35.1 BENIGN PROSTATIC HYPERPLASIA WITH NOCTURIA: ICD-10-CM

## 2023-09-05 RX ORDER — TAMSULOSIN HYDROCHLORIDE 0.4 MG/1
CAPSULE ORAL
Qty: 30 CAPSULE | Refills: 5 | Status: SHIPPED | OUTPATIENT
Start: 2023-09-05 | End: 2023-09-07

## 2023-09-05 NOTE — TELEPHONE ENCOUNTER
Shahbaz Turner is calling to request a refill on the following medication(s):    Medication Request:  Requested Prescriptions     Pending Prescriptions Disp Refills    tamsulosin (FLOMAX) 0.4 MG capsule [Pharmacy Med Name: TAMSULOSIN HCL 0.4 MG CAPSULE] 30 capsule 5     Sig: TAKE ONE CAPSULE BY MOUTH DAILY       Last Visit Date (If Applicable):  9/3/9614    Next Visit Date:    9/7/2023

## 2023-09-07 ENCOUNTER — OFFICE VISIT (OUTPATIENT)
Dept: FAMILY MEDICINE CLINIC | Age: 73
End: 2023-09-07
Payer: MEDICARE

## 2023-09-07 VITALS
TEMPERATURE: 97.5 F | HEART RATE: 98 BPM | WEIGHT: 277.2 LBS | OXYGEN SATURATION: 95 % | BODY MASS INDEX: 38.66 KG/M2 | SYSTOLIC BLOOD PRESSURE: 118 MMHG | DIASTOLIC BLOOD PRESSURE: 60 MMHG

## 2023-09-07 DIAGNOSIS — I10 ESSENTIAL HYPERTENSION: ICD-10-CM

## 2023-09-07 DIAGNOSIS — R35.1 BENIGN PROSTATIC HYPERPLASIA WITH NOCTURIA: ICD-10-CM

## 2023-09-07 DIAGNOSIS — Z23 NEED FOR IMMUNIZATION AGAINST INFLUENZA: Primary | ICD-10-CM

## 2023-09-07 DIAGNOSIS — I48.91 ATRIAL FIBRILLATION, UNSPECIFIED TYPE (HCC): ICD-10-CM

## 2023-09-07 DIAGNOSIS — N40.1 BENIGN PROSTATIC HYPERPLASIA WITH NOCTURIA: ICD-10-CM

## 2023-09-07 DIAGNOSIS — I50.41 ACUTE COMBINED SYSTOLIC AND DIASTOLIC CONGESTIVE HEART FAILURE (HCC): ICD-10-CM

## 2023-09-07 DIAGNOSIS — E11.59 TYPE 2 DIABETES MELLITUS WITH OTHER CIRCULATORY COMPLICATION, WITHOUT LONG-TERM CURRENT USE OF INSULIN (HCC): ICD-10-CM

## 2023-09-07 DIAGNOSIS — R93.1 ABNORMAL ECHOCARDIOGRAM: ICD-10-CM

## 2023-09-07 DIAGNOSIS — Z23 NEED FOR SHINGLES VACCINE: ICD-10-CM

## 2023-09-07 LAB — HBA1C MFR BLD: 8.3 %

## 2023-09-07 PROCEDURE — G0008 ADMIN INFLUENZA VIRUS VAC: HCPCS | Performed by: INTERNAL MEDICINE

## 2023-09-07 PROCEDURE — 3078F DIAST BP <80 MM HG: CPT | Performed by: INTERNAL MEDICINE

## 2023-09-07 PROCEDURE — 99215 OFFICE O/P EST HI 40 MIN: CPT | Performed by: INTERNAL MEDICINE

## 2023-09-07 PROCEDURE — 3074F SYST BP LT 130 MM HG: CPT | Performed by: INTERNAL MEDICINE

## 2023-09-07 PROCEDURE — 3052F HG A1C>EQUAL 8.0%<EQUAL 9.0%: CPT | Performed by: INTERNAL MEDICINE

## 2023-09-07 PROCEDURE — 83036 HEMOGLOBIN GLYCOSYLATED A1C: CPT | Performed by: INTERNAL MEDICINE

## 2023-09-07 PROCEDURE — 1123F ACP DISCUSS/DSCN MKR DOCD: CPT | Performed by: INTERNAL MEDICINE

## 2023-09-07 PROCEDURE — 90694 VACC AIIV4 NO PRSRV 0.5ML IM: CPT | Performed by: INTERNAL MEDICINE

## 2023-09-07 RX ORDER — TAMSULOSIN HYDROCHLORIDE 0.4 MG/1
0.8 CAPSULE ORAL DAILY
Qty: 180 CAPSULE | Refills: 1 | Status: SHIPPED | OUTPATIENT
Start: 2023-09-07

## 2023-09-07 RX ORDER — GLUCOSAMINE HCL/CHONDROITIN SU 500-400 MG
CAPSULE ORAL
Qty: 100 STRIP | Refills: 3 | Status: SHIPPED | OUTPATIENT
Start: 2023-09-07

## 2023-09-07 RX ORDER — LANCETS 30 GAUGE
1 EACH MISCELLANEOUS 2 TIMES DAILY
Qty: 100 EACH | Refills: 5 | Status: SHIPPED | OUTPATIENT
Start: 2023-09-07

## 2023-09-07 RX ORDER — LISINOPRIL 10 MG/1
5 TABLET ORAL DAILY
Qty: 90 TABLET | Refills: 1
Start: 2023-09-07

## 2023-09-07 RX ORDER — DILTIAZEM HYDROCHLORIDE 240 MG/1
240 CAPSULE, COATED, EXTENDED RELEASE ORAL DAILY
Qty: 30 CAPSULE | Refills: 3 | Status: SHIPPED | OUTPATIENT
Start: 2023-09-07

## 2023-09-07 RX ORDER — ALBUTEROL SULFATE 90 UG/1
AEROSOL, METERED RESPIRATORY (INHALATION)
COMMUNITY
Start: 2023-08-22

## 2023-09-07 RX ORDER — ZOSTER VACCINE RECOMBINANT, ADJUVANTED 50 MCG/0.5
0.5 KIT INTRAMUSCULAR SEE ADMIN INSTRUCTIONS
Qty: 0.5 ML | Refills: 0 | Status: SHIPPED | OUTPATIENT
Start: 2023-09-07 | End: 2023-09-08

## 2023-09-07 NOTE — PROGRESS NOTES
[facility]  [facility] Rosalino Rodriguez [address] [address]  Dept: [phone]  Dept Fax: [phone]      Angélica Munoz is a 68 y.o. male who presents today for hismedical conditions/complaints as noted below. Angélica Munoz is c/o of Diabetes (F/u) and Insomnia (For the last days )        Assessment/Plan:     1. Need for immunization against influenza  2. Type 2 diabetes mellitus with other circulatory complication, without long-term current use of insulin (HCC)  The following orders have not been finalized:  -     Lancets MISC  -     blood glucose monitor strips  3. Need for shingles vaccine  The following orders have not been finalized:  -     zoster recombinant adjuvanted vaccine [facility]) 50 MCG/0.5ML SUSR injection          No follow-ups on file. HPI     Admitted in July with dyspnea, found to be in atrial fibrillation, rate contrlled with diltiazem, discharged home with eliquis. Has cardiology follow-up next week. He states prior to admission couldn't lay down flat and breathe. Echo during admission showed EF <30%, previous    Diabetes    Insomnia      BP Readings from Last 3 Encounters:   09/07/23 118/60   07/14/23 113/76   05/03/23 130/72              Past Medical History:   Diagnosis Date    Ankylosing spondylitis (HCC)     Arthritis     Asthma not dependent on systemic steroids without complication 92/55/7112    CAD (coronary artery disease)     Hypertension     DEB treated with BiPAP 12/07/2017    Pulmonary hypertension ([address]) 57/51/0549    Silicosis ([address]) 89/9850    STEMI (ST elevation myocardial infarction) ([address]) 12/9/2017      Past Surgical History:   Procedure Laterality Date    CORONARY ANGIOPLASTY WITH STENT PLACEMENT  12/09/2017    JOINT REPLACEMENT      right hip       No family history on file.     Social History     Tobacco Use    Smoking status: Former     Packs/day: 0.25     Years: 1.00     Pack years: 0.25     Types: Cigarettes     Quit

## 2023-09-07 NOTE — PATIENT INSTRUCTIONS
Increase your diltiazem to 240 mg daily - take 2 pills daily of current 120 mg dose till you run out then start the 240 mg pills called in today.    Increase your tamsulosin  to 0.8 mg daily - take 2 pills daily of current 0.4mg dose, new prescription called in today   Start benadryl 25-50mg at night to help with sleep   Decrease lisinopril to 5mg daily   Get fasting labs done when you can - orders already in the chart

## 2023-09-12 RX ORDER — APIXABAN 5 MG/1
5 TABLET, FILM COATED ORAL 2 TIMES DAILY
Qty: 60 TABLET | Refills: 0 | OUTPATIENT
Start: 2023-09-12

## 2023-09-12 NOTE — PROGRESS NOTES
Pt is here today for a regular 4 mo DM f/u    Pt states he was at [facility] on 07/[facility] for 3 days, pt was in a-fib, pt has been feeling okay since being discharged, pt was taken of Brilinta and started on Eliquis     Pt states the last couple of nights he has been having trouble sleeping, pt will fall asleep but waking up a lot, having a bunch of different types of dreams then having  hard time falling back asleep, this is on and off not every night for about a week now Performed

## 2023-09-14 RX ORDER — APIXABAN 5 MG/1
5 TABLET, FILM COATED ORAL 2 TIMES DAILY
Qty: 60 TABLET | Refills: 0 | OUTPATIENT
Start: 2023-09-14

## 2023-09-15 RX ORDER — APIXABAN 5 MG/1
5 TABLET, FILM COATED ORAL 2 TIMES DAILY
Qty: 60 TABLET | Refills: 0 | OUTPATIENT
Start: 2023-09-15

## 2023-09-18 ENCOUNTER — TELEPHONE (OUTPATIENT)
Dept: FAMILY MEDICINE CLINIC | Age: 73
End: 2023-09-18

## 2023-09-18 RX ORDER — APIXABAN 5 MG/1
5 TABLET, FILM COATED ORAL 2 TIMES DAILY
Qty: 60 TABLET | Refills: 5 | Status: SHIPPED | OUTPATIENT
Start: 2023-09-18

## 2023-09-18 NOTE — TELEPHONE ENCOUNTER
Renetta Parikh called in regards to having his AWV at home with insurance, [employer]. It was suggested he stop Metformin. He was prescribed Farxiga by Reynaldo Lake CNP but it is to expensive for him. He said was suggested he have an echo, stress test and endurance testing.   I asked that he drop off the paper work that he was given from insurance with all recommendations and A1c results

## 2023-09-18 NOTE — TELEPHONE ENCOUNTER
Lipid, Fasting Once 05/03/2023   Comprehensive Metabolic Panel Once 71/73/3476   PSA Screening Once 05/03/2023   Hepatitis C Antibody Once 05/03/2023               Patient Active Problem List:     Ankylosing spondylitis of multiple sites in spine ([address])     Chronic silicosis ([address])     Moderate persistent asthma without complication     Coronary artery disease involving native coronary artery of native heart without angina pectoris     History of ST elevation myocardial infarction (STEMI)     Class 3 severe obesity due to excess calories with serious comorbidity and body mass index (BMI) of 40.0 to 44.9 in adult ([address])     Moderately severe recurrent major depression ([address])     Eczema of face     Benign prostatic hyperplasia with nocturia     Type 2 diabetes mellitus with other circulatory complication, without long-term current use of insulin (HCC)     Angina pectoris, unspecified     Atherosclerotic heart disease of native coronary artery with unspecified angina pectoris     Atrial fibrillation ([address])     New onset atrial fibrillation (HCC)     Abnormal echocardiogram     Acute combined systolic and diastolic congestive heart failure ([address])

## 2023-09-18 NOTE — TELEPHONE ENCOUNTER
Get records for AWV  He saw cardiology last week - can discuss requested testing with them. They are also the ones that ordered the farxiga - he needs to call them with this.

## 2023-09-19 NOTE — TELEPHONE ENCOUNTER
Called [facility] with addie at [phone]. Spoke with HARRY and she stated the report will be faxed.

## 2023-09-19 NOTE — TELEPHONE ENCOUNTER
Spoke with pt, he said all the recommendations were from the in home AWV visit. He was told to stop metformin and start an injection. Pt states it was also suggested to have further cardiology testing. Pt was advised to call cardiology. Pt will keep 09/27/2023 appointment to discuss. [facility]wait[facility] for review.

## 2023-10-04 DIAGNOSIS — E11.59 TYPE 2 DIABETES MELLITUS WITH OTHER CIRCULATORY COMPLICATION, WITHOUT LONG-TERM CURRENT USE OF INSULIN (HCC): ICD-10-CM

## 2023-10-04 DIAGNOSIS — I10 ESSENTIAL HYPERTENSION: ICD-10-CM

## 2023-10-04 NOTE — TELEPHONE ENCOUNTER
[facility]: ADHERENCE REVIEW  Identified care gap per [employer]: fills at [facility]: ACE/ARB, Diabetes, and Statin adherence    ASSESSMENT    ACE/ARB ADHERENCE    Insurance Records claims through 23 (Prior Year PDC = 99% - PASSED; YTD [address] = 79%; Potential Fail Date: 10/13/23):   LISINOPRIL TAB 10MG last filled on 23 for 90 day supply. Next refill due: 23    Prescribed si tablet/capsule daily    Per Reconcile Dispense History: last filled on 23 for 90 day supply. Per [address]: last picked up on 23 for 90 day supply. Pharmacy claimed no new rx on file. BP Readings from Last 3 Encounters:   23 (!) 142/88   23 118/60   23 113/76     Estimated Creatinine Clearance: 127 mL/min (based on SCr of 0.7 mg/dL). Lab Results   Component Value Date    CREATININE 0.7 2023     Lab Results   Component Value Date    K 4.5 2023     DIABETES ADHERENCE    Insurance Records claims through 23 (Prior Year PDC = 96% - PASSED; YTD PDC = 82%; Potential Fail Date: 10/20/23): METFORMIN TAB 500MG ER last filled on 23 for 90 day supply. Next refill due: 23    Prescribed si tablet/capsule twice daily    Per Reconcile Dispense History: last filled on 23 for 90 day supply. Per [address]: last picked up on 23 for 90 day supply. will get 90 day supply ready to  since past due. Billed through [employer]. Lab Results   Component Value Date    LABA1C 8.3 2023    LABA1C 8.1 2022    LABA1C 9.0 2022     NOTE: A1c <9%    STATIN ADHERENCE    Insurance Records claims through 23 (Prior Year PDC = 95% - PASSED; YTD PDC = 96%; Potential Fail Date: 23):   ATORVASTATIN TAB 80MG last filled on 23 for 90 day supply. Next refill due: 9/18/23    Prescribed si tablet/capsule daily    Per Reconcile Dispense History: last filled on 23 for 90 day supply.      Per [address]: last picked up on

## 2023-10-05 RX ORDER — LISINOPRIL 10 MG/1
5 TABLET ORAL DAILY
Qty: 50 TABLET | Refills: 0 | Status: SHIPPED | OUTPATIENT
Start: 2023-10-05

## 2023-10-05 NOTE — TELEPHONE ENCOUNTER
As noted below, 9/7/23 PCP OV instructions: Decrease lisinopril to 5mg daily  Appears rx was updated on medication list as \"adjust sig\" - will pend updated rx to PCP. Retracted letter for now; will update/send pending lisinopril rx.

## 2023-10-05 NOTE — TELEPHONE ENCOUNTER
Jewell Bagley MD, patient out of refills and patient eligible for up to 100-day supply, if appropriate.  Rx(s) pended for your signature/modification as appropriate    Last Visit: 9/7/23  Next Visit: 12/7/23    Thank you,  Vania Gross, PharmD, [address] [place] [address], toll free: [phone], option 1

## 2023-11-08 ENCOUNTER — OFFICE VISIT (OUTPATIENT)
Dept: FAMILY MEDICINE CLINIC | Age: 73
End: 2023-11-08
Payer: MEDICARE

## 2023-11-08 VITALS
DIASTOLIC BLOOD PRESSURE: 68 MMHG | TEMPERATURE: 97.7 F | SYSTOLIC BLOOD PRESSURE: 88 MMHG | BODY MASS INDEX: 38.1 KG/M2 | WEIGHT: 273.2 LBS | OXYGEN SATURATION: 95 % | HEART RATE: 121 BPM

## 2023-11-08 DIAGNOSIS — I25.10 CORONARY ARTERY DISEASE INVOLVING NATIVE CORONARY ARTERY OF NATIVE HEART WITHOUT ANGINA PECTORIS: ICD-10-CM

## 2023-11-08 DIAGNOSIS — E11.59 TYPE 2 DIABETES MELLITUS WITH OTHER CIRCULATORY COMPLICATION, WITHOUT LONG-TERM CURRENT USE OF INSULIN (HCC): Primary | ICD-10-CM

## 2023-11-08 DIAGNOSIS — I95.2 HYPOTENSION DUE TO MEDICATION: ICD-10-CM

## 2023-11-08 DIAGNOSIS — E66.01 CLASS 3 SEVERE OBESITY DUE TO EXCESS CALORIES WITH SERIOUS COMORBIDITY AND BODY MASS INDEX (BMI) OF 40.0 TO 44.9 IN ADULT (HCC): ICD-10-CM

## 2023-11-08 DIAGNOSIS — I48.91 ATRIAL FIBRILLATION, UNSPECIFIED TYPE (HCC): ICD-10-CM

## 2023-11-08 DIAGNOSIS — I50.41 ACUTE COMBINED SYSTOLIC AND DIASTOLIC CONGESTIVE HEART FAILURE (HCC): ICD-10-CM

## 2023-11-08 PROCEDURE — 99215 OFFICE O/P EST HI 40 MIN: CPT | Performed by: INTERNAL MEDICINE

## 2023-11-08 PROCEDURE — 1123F ACP DISCUSS/DSCN MKR DOCD: CPT | Performed by: INTERNAL MEDICINE

## 2023-11-08 PROCEDURE — 3052F HG A1C>EQUAL 8.0%<EQUAL 9.0%: CPT | Performed by: INTERNAL MEDICINE

## 2023-11-08 RX ORDER — DIGOXIN 125 MCG
125 TABLET ORAL DAILY
COMMUNITY

## 2023-11-08 RX ORDER — SOTALOL HYDROCHLORIDE 120 MG/1
120 TABLET ORAL 2 TIMES DAILY
COMMUNITY

## 2023-11-08 RX ORDER — BLOOD PRESSURE TEST KIT
1 KIT MISCELLANEOUS 2 TIMES DAILY
Qty: 1 KIT | Refills: 0 | Status: SHIPPED | OUTPATIENT
Start: 2023-11-08

## 2023-11-08 RX ORDER — SACUBITRIL AND VALSARTAN 49; 51 MG/1; MG/1
1 TABLET, FILM COATED ORAL 2 TIMES DAILY
COMMUNITY

## 2023-11-08 RX ORDER — DILTIAZEM HYDROCHLORIDE 120 MG/1
120 CAPSULE, COATED, EXTENDED RELEASE ORAL DAILY
COMMUNITY
Start: 2023-10-12

## 2023-11-08 RX ORDER — LANCETS 30 GAUGE
EACH MISCELLANEOUS
COMMUNITY
Start: 2023-09-07

## 2023-11-08 ASSESSMENT — ENCOUNTER SYMPTOMS
COUGH: 0
BLOOD IN STOOL: 0
CONSTIPATION: 0
DIARRHEA: 0
ABDOMINAL PAIN: 0
CHOKING: 0
SHORTNESS OF BREATH: 0
ANAL BLEEDING: 0
VOMITING: 0
WHEEZING: 0
NAUSEA: 0
CHEST TIGHTNESS: 0

## 2023-11-08 ASSESSMENT — VISUAL ACUITY: OU: 1

## 2023-11-08 NOTE — PATIENT INSTRUCTIONS
Verify with cardiology whether you need to continue taking the Farxiga (dapagliflozin) 10mg daily - they had prescribed this for you following your office visit with them in September.

## 2023-11-08 NOTE — PROGRESS NOTES
[facility]  [facility] [address]  [address] [facility] [address]  Dept: [phone]  Dept Fax: [phone]      Ana Pennington is a 68 y.o. male who presents today for hismedical conditions/complaints as noted below. Ana Pennington is c/o of Diabetes (F/u), Health Maintenance (Had an AWV at home ), and Other (Discuss Afib )        Assessment/Plan:     1. Type 2 diabetes mellitus with other circulatory complication, without long-term current use of insulin ([address])  2. Atrial fibrillation, unspecified type ([address])  3. Hypotension due to medication  -     Blood Pressure KIT; 2 times daily Starting Wed 11/8/2023, Disp-1 kit, R-0, PrintLarge Adult cuff  4. Acute combined systolic and diastolic congestive heart failure (HCC)  -     Blood Pressure KIT; 2 times daily Starting Wed 11/8/2023, Disp-1 kit, R-0, PrintLarge Adult cuff  5. Class 3 severe obesity due to excess calories with serious comorbidity and body mass index (BMI) of 40.0 to 44.9 in adult ([address])  6. Coronary artery disease involving native coronary artery of native heart without angina pectoris      Patient Instructions   Verify with cardiology whether you need to continue taking the Trivedi (dapagliflozin) 10mg daily - they had prescribed this for you following your office visit with them in September. No follow-ups on file. HPI     Underwent cardioversion on Monday 11/6/23 for atrial fibrillation - has been noticing huge improvement in energy and ability to move. He was short of breath going to the bathroom prior t that, now able to walk and move around. He is still weak, thinks due to long duratin of dyspnea. He is not sure whether he is taking the Trivedi - ordered per cardiology - does not sound familiar, not on d/c med list from [facility] this week - will check when he gets home     Diabetes - doing well with current regimen - metformin.  Denies hypoglycemia, hyperglycemia, fatigue, polyuria, polydipsia,

## 2023-11-14 ENCOUNTER — TELEPHONE (OUTPATIENT)
Dept: FAMILY MEDICINE CLINIC | Age: 73
End: 2023-11-14

## 2023-11-14 NOTE — TELEPHONE ENCOUNTER
No - his cardiologist sent a six month supply to his pharmacy in September - he needs to call the pharmacy if he doesn't have the medication

## 2023-11-16 NOTE — TELEPHONE ENCOUNTER
Spoke with patient and states he never received this from the pharmacy.  States he will call his cardiologist.

## 2023-12-31 DIAGNOSIS — E11.59 TYPE 2 DIABETES MELLITUS WITH OTHER CIRCULATORY COMPLICATION, WITHOUT LONG-TERM CURRENT USE OF INSULIN (HCC): ICD-10-CM

## 2024-01-02 RX ORDER — METFORMIN HYDROCHLORIDE 500 MG/1
500 TABLET, EXTENDED RELEASE ORAL 2 TIMES DAILY WITH MEALS
Qty: 180 TABLET | Refills: 1 | Status: SHIPPED | OUTPATIENT
Start: 2024-01-02

## 2024-01-02 NOTE — TELEPHONE ENCOUNTER
Last visit: 11/08/2023  Last Med refill: 05/17/2023  Does patient have enough medication for 72 hours: No:     Next Visit Date:  Future Appointments   Date Time Provider Department Center   2/8/2024  1:45 PM Carole Norman MD [email or web address]       Health Maintenance   Topic Date Due    Hepatitis C screen  Never done    Shingles vaccine (1 of 2) Never done    Diabetic foot exam  09/15/2022    Lipids  03/23/2023    Annual Wellness Visit (Medicare Advantage)  01/01/2024    Depression Monitoring  01/03/2024    Pneumococcal 65+ years Vaccine (2 - PCV) 05/18/2025 (Originally 12/22/2021)    Diabetic retinal exam  03/13/2024    Colorectal Cancer Screen  03/25/2024    Diabetic Alb to Cr ratio (uACR) test  05/03/2024    GFR test (Diabetes, CKD 3-4, OR last GFR 15-59)  07/14/2024    A1C test (Diabetic or Prediabetic)  09/07/2024    DTaP/Tdap/Td vaccine (2 - Td or Tdap) 09/26/2032    Flu vaccine  Completed    COVID-19 Vaccine  Completed    Respiratory Syncytial Virus (RSV) Pregnant or age 60 yrs+  Completed    AAA screen  Completed    Hepatitis A vaccine  Aged Out    Hepatitis B vaccine  Aged Out    Hib vaccine  Aged Out    Polio vaccine  Aged Out    Meningococcal (ACWY) vaccine  Aged Out    Prostate Specific Antigen (PSA) Screening or Monitoring  Discontinued       Hemoglobin A1C (%)   Date Value   09/07/2023 8.3   12/27/2022 8.1   09/26/2022 9.0             ( goal A1C is < 7)   No components found for: \"LABMICR\"  LDL Cholesterol (mg/dL)   Date Value   03/23/2022 59   12/16/2020 39       (goal LDL is <100)   AST (U/L)   Date Value   04/20/2022 14     ALT (U/L)   Date Value   04/20/2022 22     BUN (mg/dL)   Date Value   07/14/2023 15     BP Readings from Last 3 Encounters:   11/08/23 (!) 88/68   09/12/23 (!) 142/88   09/07/23 118/60          (goal 120/80)    All Future Testing planned in [facility]  Lab Frequency Next Occurrence   Hemoglobin A1C Once 05/03/2023   Lipid, Fasting Once 05/03/2023   Comprehensive Metabolic

## 2024-01-10 DIAGNOSIS — I10 ESSENTIAL HYPERTENSION: ICD-10-CM

## 2024-01-10 DIAGNOSIS — E11.59 TYPE 2 DIABETES MELLITUS WITH OTHER CIRCULATORY COMPLICATION, WITHOUT LONG-TERM CURRENT USE OF INSULIN (HCC): ICD-10-CM

## 2024-01-10 RX ORDER — LISINOPRIL 10 MG/1
5 TABLET ORAL DAILY
Qty: 50 TABLET | Refills: 0 | Status: SHIPPED | OUTPATIENT
Start: 2024-01-10

## 2024-01-10 NOTE — TELEPHONE ENCOUNTER
Name from pharmacy: LISINOPRIL 10 MG TABLET              The original prescription was discontinued on 11/8/2023 by Carole Norman MD. Renewing this prescription may not be appropriate     LOV:  11/08/2023

## 2024-01-24 DIAGNOSIS — F41.1 GAD (GENERALIZED ANXIETY DISORDER): ICD-10-CM

## 2024-01-24 RX ORDER — BUSPIRONE HYDROCHLORIDE 10 MG/1
TABLET ORAL
Qty: 180 TABLET | Refills: 1 | Status: SHIPPED | OUTPATIENT
Start: 2024-01-24

## 2024-01-24 NOTE — TELEPHONE ENCOUNTER
Panel Once 05/03/2023   PSA Screening Once 05/03/2023   Hepatitis C Antibody Once 05/03/2023               Patient Active Problem List:     Ankylosing spondylitis of multiple sites in spine (HCC)     Chronic silicosis (HCC)     Moderate persistent asthma without complication     Coronary artery disease involving native coronary artery of native heart without angina pectoris     History of ST elevation myocardial infarction (STEMI)     Class 3 severe obesity due to excess calories with serious comorbidity and body mass index (BMI) of 40.0 to 44.9 in adult (HCC)     Moderately severe recurrent major depression (HCC)     Eczema of face     Benign prostatic hyperplasia with nocturia     Type 2 diabetes mellitus with other circulatory complication, without long-term current use of insulin (HCC)     Angina pectoris, unspecified     Atherosclerotic heart disease of native coronary artery with unspecified angina pectoris     Atrial fibrillation (HCC)     New onset atrial fibrillation (HCC)     Abnormal echocardiogram     Acute combined systolic and diastolic congestive heart failure (HCC)

## 2024-02-12 ENCOUNTER — APPOINTMENT (OUTPATIENT)
Dept: GENERAL RADIOLOGY | Age: 74
DRG: 308 | End: 2024-02-12
Payer: MEDICARE

## 2024-02-12 ENCOUNTER — HOSPITAL ENCOUNTER (INPATIENT)
Age: 74
LOS: 2 days | Discharge: HOME | DRG: 308 | End: 2024-02-14
Attending: EMERGENCY MEDICINE | Admitting: INTERNAL MEDICINE
Payer: MEDICARE

## 2024-02-12 DIAGNOSIS — I42.9 CARDIOMYOPATHY (HCC): ICD-10-CM

## 2024-02-12 DIAGNOSIS — I50.41 ACUTE COMBINED SYSTOLIC AND DIASTOLIC CONGESTIVE HEART FAILURE (HCC): ICD-10-CM

## 2024-02-12 DIAGNOSIS — I47.10 SVT (SUPRAVENTRICULAR TACHYCARDIA): Primary | ICD-10-CM

## 2024-02-12 LAB
ALBUMIN SERPL-MCNC: 3.8 G/DL (ref 3.5–5.2)
ALBUMIN/GLOB SERPL: 1.3 {RATIO} (ref 1–2.5)
ALP SERPL-CCNC: 380 U/L (ref 40–129)
ALT SERPL-CCNC: 20 U/L (ref 5–41)
AST SERPL-CCNC: 13 U/L
B PARAP IS1001 DNA NPH QL NAA+NON-PROBE: NOT DETECTED
B PERT DNA SPEC QL NAA+PROBE: NOT DETECTED
BASOPHILS # BLD: 0.08 K/UL (ref 0–0.2)
BASOPHILS NFR BLD: 1 % (ref 0–2)
BILIRUB SERPL-MCNC: 0.6 MG/DL (ref 0.3–1.2)
BNP SERPL-MCNC: 194 PG/ML
BUN SERPL-MCNC: 14 MG/DL (ref 8–23)
C PNEUM DNA NPH QL NAA+NON-PROBE: NOT DETECTED
CALCIUM SERPL-MCNC: 9.1 MG/DL (ref 8.6–10.4)
CHLORIDE SERPL-SCNC: 100 MMOL/L (ref 98–107)
CO2 SERPL-SCNC: 22 MMOL/L (ref 20–31)
CREAT SERPL-MCNC: 0.8 MG/DL (ref 0.7–1.2)
DIGOXIN SERPL-MCNC: 0.4 NG/ML (ref 0.5–2)
EOSINOPHIL # BLD: 0.25 K/UL (ref 0–0.44)
EOSINOPHILS RELATIVE PERCENT: 2 % (ref 1–4)
ERYTHROCYTE [DISTWIDTH] IN BLOOD BY AUTOMATED COUNT: 15.1 % (ref 11.8–14.4)
FLUBV RNA NPH QL NAA+NON-PROBE: NOT DETECTED
GFR SERPL CREATININE-BSD FRML MDRD: >60 ML/MIN/1.73M2
GLUCOSE BLD-MCNC: 170 MG/DL (ref 75–110)
GLUCOSE BLD-MCNC: 180 MG/DL (ref 75–110)
GLUCOSE SERPL-MCNC: 236 MG/DL (ref 70–99)
HADV DNA NPH QL NAA+NON-PROBE: NOT DETECTED
HCOV 229E RNA NPH QL NAA+NON-PROBE: NOT DETECTED
HCOV HKU1 RNA NPH QL NAA+NON-PROBE: NOT DETECTED
HCOV NL63 RNA NPH QL NAA+NON-PROBE: NOT DETECTED
HCOV OC43 RNA NPH QL NAA+NON-PROBE: NOT DETECTED
HCT VFR BLD AUTO: 43.6 % (ref 40.7–50.3)
HGB BLD-MCNC: 14.3 G/DL (ref 13–17)
HMPV RNA NPH QL NAA+NON-PROBE: NOT DETECTED
HPIV1 RNA NPH QL NAA+NON-PROBE: NOT DETECTED
HPIV2 RNA NPH QL NAA+NON-PROBE: NOT DETECTED
HPIV3 RNA NPH QL NAA+NON-PROBE: NOT DETECTED
HPIV4 RNA NPH QL NAA+NON-PROBE: NOT DETECTED
IMM GRANULOCYTES # BLD AUTO: 0.08 K/UL (ref 0–0.3)
IMM GRANULOCYTES NFR BLD: 1 %
LACTIC ACID, SEPSIS WHOLE BLOOD: 2.3 MMOL/L (ref 0.5–1.9)
LACTIC ACID, SEPSIS WHOLE BLOOD: 2.4 MMOL/L (ref 0.5–1.9)
LACTIC ACID, WHOLE BLOOD: 2.5 MMOL/L (ref 0.7–2.1)
LYMPHOCYTES NFR BLD: 1.23 K/UL (ref 1.1–3.7)
LYMPHOCYTES RELATIVE PERCENT: 9 % (ref 24–43)
M PNEUMO DNA NPH QL NAA+NON-PROBE: NOT DETECTED
MCH RBC QN AUTO: 27.9 PG (ref 25.2–33.5)
MCHC RBC AUTO-ENTMCNC: 32.8 G/DL (ref 28.4–34.8)
MCV RBC AUTO: 85 FL (ref 82.6–102.9)
MONOCYTES NFR BLD: 1.35 K/UL (ref 0.1–1.2)
MONOCYTES NFR BLD: 9 % (ref 3–12)
NEUTROPHILS NFR BLD: 78 % (ref 36–65)
NEUTS SEG NFR BLD: 11.31 K/UL (ref 1.5–8.1)
NRBC BLD-RTO: 0 PER 100 WBC
PLATELET # BLD AUTO: 278 K/UL (ref 138–453)
PMV BLD AUTO: 10 FL (ref 8.1–13.5)
POTASSIUM SERPL-SCNC: 4.2 MMOL/L (ref 3.7–5.3)
PROCALCITONIN SERPL-MCNC: 0.05 NG/ML
PROT SERPL-MCNC: 6.8 G/DL (ref 6.4–8.3)
RBC # BLD AUTO: 5.13 M/UL (ref 4.21–5.77)
RBC # BLD: ABNORMAL 10*6/UL
RSV RNA NPH QL NAA+NON-PROBE: NOT DETECTED
RV+EV RNA NPH QL NAA+NON-PROBE: NOT DETECTED
SARS-COV-2 RDRP RESP QL NAA+PROBE: NOT DETECTED
SARS-COV-2 RNA NPH QL NAA+NON-PROBE: NOT DETECTED
SODIUM SERPL-SCNC: 135 MMOL/L (ref 135–144)
SPECIMEN DESCRIPTION: NORMAL
SPECIMEN DESCRIPTION: NORMAL
TROPONIN I SERPL HS-MCNC: 21 NG/L (ref 0–22)
TROPONIN I SERPL HS-MCNC: 28 NG/L (ref 0–22)
WBC OTHER # BLD: 14.3 K/UL (ref 3.5–11.3)

## 2024-02-12 PROCEDURE — 83880 ASSAY OF NATRIURETIC PEPTIDE: CPT

## 2024-02-12 PROCEDURE — 2580000003 HC RX 258

## 2024-02-12 PROCEDURE — 2060000000 HC ICU INTERMEDIATE R&B

## 2024-02-12 PROCEDURE — 84145 PROCALCITONIN (PCT): CPT

## 2024-02-12 PROCEDURE — 99223 1ST HOSP IP/OBS HIGH 75: CPT | Performed by: INTERNAL MEDICINE

## 2024-02-12 PROCEDURE — 0202U NFCT DS 22 TRGT SARS-COV-2: CPT

## 2024-02-12 PROCEDURE — 6360000002 HC RX W HCPCS: Performed by: STUDENT IN AN ORGANIZED HEALTH CARE EDUCATION/TRAINING PROGRAM

## 2024-02-12 PROCEDURE — 87040 BLOOD CULTURE FOR BACTERIA: CPT

## 2024-02-12 PROCEDURE — 84484 ASSAY OF TROPONIN QUANT: CPT

## 2024-02-12 PROCEDURE — 2700000000 HC OXYGEN THERAPY PER DAY

## 2024-02-12 PROCEDURE — 85025 COMPLETE CBC W/AUTO DIFF WBC: CPT

## 2024-02-12 PROCEDURE — 94761 N-INVAS EAR/PLS OXIMETRY MLT: CPT

## 2024-02-12 PROCEDURE — 5A09457 ASSISTANCE WITH RESPIRATORY VENTILATION, 24-96 CONSECUTIVE HOURS, CONTINUOUS POSITIVE AIRWAY PRESSURE: ICD-10-PCS

## 2024-02-12 PROCEDURE — 80162 ASSAY OF DIGOXIN TOTAL: CPT

## 2024-02-12 PROCEDURE — 94660 CPAP INITIATION&MGMT: CPT

## 2024-02-12 PROCEDURE — 6360000002 HC RX W HCPCS

## 2024-02-12 PROCEDURE — 36415 COLL VENOUS BLD VENIPUNCTURE: CPT

## 2024-02-12 PROCEDURE — 93005 ELECTROCARDIOGRAM TRACING: CPT | Performed by: INTERNAL MEDICINE

## 2024-02-12 PROCEDURE — 87635 SARS-COV-2 COVID-19 AMP PRB: CPT

## 2024-02-12 PROCEDURE — 6370000000 HC RX 637 (ALT 250 FOR IP)

## 2024-02-12 PROCEDURE — 82947 ASSAY GLUCOSE BLOOD QUANT: CPT

## 2024-02-12 PROCEDURE — 71045 X-RAY EXAM CHEST 1 VIEW: CPT

## 2024-02-12 PROCEDURE — 96375 TX/PRO/DX INJ NEW DRUG ADDON: CPT

## 2024-02-12 PROCEDURE — 2580000003 HC RX 258: Performed by: STUDENT IN AN ORGANIZED HEALTH CARE EDUCATION/TRAINING PROGRAM

## 2024-02-12 PROCEDURE — 83605 ASSAY OF LACTIC ACID: CPT

## 2024-02-12 PROCEDURE — 93005 ELECTROCARDIOGRAM TRACING: CPT | Performed by: STUDENT IN AN ORGANIZED HEALTH CARE EDUCATION/TRAINING PROGRAM

## 2024-02-12 PROCEDURE — 80053 COMPREHEN METABOLIC PANEL: CPT

## 2024-02-12 PROCEDURE — 87899 AGENT NOS ASSAY W/OPTIC: CPT

## 2024-02-12 PROCEDURE — 99285 EMERGENCY DEPT VISIT HI MDM: CPT

## 2024-02-12 RX ORDER — ADENOSINE 3 MG/ML
6 INJECTION, SOLUTION INTRAVENOUS ONCE
Status: COMPLETED | OUTPATIENT
Start: 2024-02-12 | End: 2024-02-12

## 2024-02-12 RX ORDER — INSULIN LISPRO 100 [IU]/ML
0-4 INJECTION, SOLUTION INTRAVENOUS; SUBCUTANEOUS NIGHTLY
Status: DISCONTINUED | OUTPATIENT
Start: 2024-02-12 | End: 2024-02-13

## 2024-02-12 RX ORDER — ADENOSINE 3 MG/ML
INJECTION, SOLUTION INTRAVENOUS
Status: COMPLETED
Start: 2024-02-12 | End: 2024-02-12

## 2024-02-12 RX ORDER — ACETAMINOPHEN 325 MG/1
650 TABLET ORAL EVERY 6 HOURS PRN
Status: DISCONTINUED | OUTPATIENT
Start: 2024-02-12 | End: 2024-02-14 | Stop reason: HOSPADM

## 2024-02-12 RX ORDER — SODIUM CHLORIDE 9 MG/ML
INJECTION, SOLUTION INTRAVENOUS PRN
Status: DISCONTINUED | OUTPATIENT
Start: 2024-02-12 | End: 2024-02-14 | Stop reason: HOSPADM

## 2024-02-12 RX ORDER — 0.9 % SODIUM CHLORIDE 0.9 %
500 INTRAVENOUS SOLUTION INTRAVENOUS ONCE
Status: COMPLETED | OUTPATIENT
Start: 2024-02-12 | End: 2024-02-12

## 2024-02-12 RX ORDER — ONDANSETRON 4 MG/1
4 TABLET, ORALLY DISINTEGRATING ORAL EVERY 8 HOURS PRN
Status: DISCONTINUED | OUTPATIENT
Start: 2024-02-12 | End: 2024-02-13

## 2024-02-12 RX ORDER — SOTALOL HYDROCHLORIDE 120 MG/1
120 TABLET ORAL 2 TIMES DAILY
Status: DISCONTINUED | OUTPATIENT
Start: 2024-02-12 | End: 2024-02-14 | Stop reason: HOSPADM

## 2024-02-12 RX ORDER — DIGOXIN 0.25 MG/ML
125 INJECTION INTRAMUSCULAR; INTRAVENOUS ONCE
Status: COMPLETED | OUTPATIENT
Start: 2024-02-12 | End: 2024-02-12

## 2024-02-12 RX ORDER — SODIUM CHLORIDE 0.9 % (FLUSH) 0.9 %
5-40 SYRINGE (ML) INJECTION PRN
Status: DISCONTINUED | OUTPATIENT
Start: 2024-02-12 | End: 2024-02-14 | Stop reason: HOSPADM

## 2024-02-12 RX ORDER — POLYETHYLENE GLYCOL 3350 17 G/17G
17 POWDER, FOR SOLUTION ORAL DAILY PRN
Status: DISCONTINUED | OUTPATIENT
Start: 2024-02-12 | End: 2024-02-14 | Stop reason: HOSPADM

## 2024-02-12 RX ORDER — DIGOXIN 125 MCG
125 TABLET ORAL DAILY
Status: DISCONTINUED | OUTPATIENT
Start: 2024-02-13 | End: 2024-02-14 | Stop reason: HOSPADM

## 2024-02-12 RX ORDER — ASPIRIN 81 MG/1
81 TABLET, CHEWABLE ORAL DAILY
Status: DISCONTINUED | OUTPATIENT
Start: 2024-02-12 | End: 2024-02-14 | Stop reason: HOSPADM

## 2024-02-12 RX ORDER — LANOLIN ALCOHOL/MO/W.PET/CERES
3 CREAM (GRAM) TOPICAL NIGHTLY PRN
Status: DISCONTINUED | OUTPATIENT
Start: 2024-02-12 | End: 2024-02-13

## 2024-02-12 RX ORDER — POTASSIUM CHLORIDE 20 MEQ/1
40 TABLET, EXTENDED RELEASE ORAL PRN
Status: DISCONTINUED | OUTPATIENT
Start: 2024-02-12 | End: 2024-02-14 | Stop reason: HOSPADM

## 2024-02-12 RX ORDER — MAGNESIUM SULFATE IN WATER 40 MG/ML
2000 INJECTION, SOLUTION INTRAVENOUS PRN
Status: DISCONTINUED | OUTPATIENT
Start: 2024-02-12 | End: 2024-02-14 | Stop reason: HOSPADM

## 2024-02-12 RX ORDER — SPIRONOLACTONE 25 MG/1
12.5 TABLET ORAL DAILY
COMMUNITY

## 2024-02-12 RX ORDER — SODIUM CHLORIDE 9 MG/ML
INJECTION, SOLUTION INTRAVENOUS CONTINUOUS
Status: DISCONTINUED | OUTPATIENT
Start: 2024-02-12 | End: 2024-02-13

## 2024-02-12 RX ORDER — DEXTROSE MONOHYDRATE 100 MG/ML
INJECTION, SOLUTION INTRAVENOUS CONTINUOUS PRN
Status: DISCONTINUED | OUTPATIENT
Start: 2024-02-12 | End: 2024-02-14 | Stop reason: HOSPADM

## 2024-02-12 RX ORDER — BUSPIRONE HYDROCHLORIDE 10 MG/1
10 TABLET ORAL 2 TIMES DAILY
Status: DISCONTINUED | OUTPATIENT
Start: 2024-02-12 | End: 2024-02-14 | Stop reason: HOSPADM

## 2024-02-12 RX ORDER — ACETAMINOPHEN 650 MG/1
650 SUPPOSITORY RECTAL EVERY 6 HOURS PRN
Status: DISCONTINUED | OUTPATIENT
Start: 2024-02-12 | End: 2024-02-14 | Stop reason: HOSPADM

## 2024-02-12 RX ORDER — DILTIAZEM HYDROCHLORIDE 120 MG/1
120 CAPSULE, COATED, EXTENDED RELEASE ORAL DAILY
Status: DISCONTINUED | OUTPATIENT
Start: 2024-02-13 | End: 2024-02-13

## 2024-02-12 RX ORDER — INSULIN LISPRO 100 [IU]/ML
0-4 INJECTION, SOLUTION INTRAVENOUS; SUBCUTANEOUS
Status: DISCONTINUED | OUTPATIENT
Start: 2024-02-12 | End: 2024-02-13

## 2024-02-12 RX ORDER — ATORVASTATIN CALCIUM 80 MG/1
80 TABLET, FILM COATED ORAL NIGHTLY
Status: DISCONTINUED | OUTPATIENT
Start: 2024-02-12 | End: 2024-02-14 | Stop reason: HOSPADM

## 2024-02-12 RX ORDER — SODIUM CHLORIDE 0.9 % (FLUSH) 0.9 %
5-40 SYRINGE (ML) INJECTION EVERY 12 HOURS SCHEDULED
Status: DISCONTINUED | OUTPATIENT
Start: 2024-02-12 | End: 2024-02-14 | Stop reason: HOSPADM

## 2024-02-12 RX ORDER — DULOXETIN HYDROCHLORIDE 30 MG/1
120 CAPSULE, DELAYED RELEASE ORAL DAILY
Status: DISCONTINUED | OUTPATIENT
Start: 2024-02-12 | End: 2024-02-14 | Stop reason: HOSPADM

## 2024-02-12 RX ORDER — ONDANSETRON 2 MG/ML
4 INJECTION INTRAMUSCULAR; INTRAVENOUS EVERY 6 HOURS PRN
Status: DISCONTINUED | OUTPATIENT
Start: 2024-02-12 | End: 2024-02-14 | Stop reason: HOSPADM

## 2024-02-12 RX ORDER — POTASSIUM CHLORIDE 7.45 MG/ML
10 INJECTION INTRAVENOUS PRN
Status: DISCONTINUED | OUTPATIENT
Start: 2024-02-12 | End: 2024-02-14 | Stop reason: HOSPADM

## 2024-02-12 RX ADMIN — Medication 3 MG: at 20:50

## 2024-02-12 RX ADMIN — CEFTRIAXONE SODIUM 1000 MG: 1 INJECTION, POWDER, FOR SOLUTION INTRAMUSCULAR; INTRAVENOUS at 14:59

## 2024-02-12 RX ADMIN — BUSPIRONE HYDROCHLORIDE 10 MG: 10 TABLET ORAL at 20:49

## 2024-02-12 RX ADMIN — DIGOXIN 125 MCG: 250 INJECTION, SOLUTION INTRAMUSCULAR; INTRAVENOUS; PARENTERAL at 17:20

## 2024-02-12 RX ADMIN — SODIUM CHLORIDE 500 ML: 9 INJECTION, SOLUTION INTRAVENOUS at 14:59

## 2024-02-12 RX ADMIN — AZITHROMYCIN DIHYDRATE 500 MG: 500 INJECTION, POWDER, LYOPHILIZED, FOR SOLUTION INTRAVENOUS at 16:00

## 2024-02-12 RX ADMIN — SODIUM CHLORIDE: 9 INJECTION, SOLUTION INTRAVENOUS at 18:10

## 2024-02-12 RX ADMIN — SODIUM CHLORIDE, PRESERVATIVE FREE 10 ML: 5 INJECTION INTRAVENOUS at 20:50

## 2024-02-12 RX ADMIN — APIXABAN 5 MG: 5 TABLET, FILM COATED ORAL at 20:50

## 2024-02-12 RX ADMIN — ATORVASTATIN CALCIUM 80 MG: 80 TABLET, FILM COATED ORAL at 20:50

## 2024-02-12 RX ADMIN — SOTALOL HYDROCHLORIDE 120 MG: 120 TABLET ORAL at 20:50

## 2024-02-12 RX ADMIN — ADENOSINE 6 MG: 3 INJECTION, SOLUTION INTRAVENOUS at 12:39

## 2024-02-12 NOTE — CARE COORDINATION
Case Management Assessment  Initial Evaluation    Date/Time of Evaluation: 2/12/2024 2:56 PM  Assessment Completed by: RASHEL CHUN RN    If patient is discharged prior to next notation, then this note serves as note for discharge by case management.    Patient Name: Wallace Moore                   YOB: 1950  Diagnosis: No admission diagnoses are documented for this encounter.                   Date / Time: 2/12/2024 12:32 PM    Patient Admission Status: Emergency   Readmission Risk (Low < 19, Mod (19-27), High > 27): Readmission Risk Score: 10.1    Current PCP: Carole Norman MD  PCP verified by CM? (P) Yes    Chart Reviewed: Yes      History Provided by: (P) Patient, Significant Other  Patient Orientation: (P) Alert and Oriented    Patient Cognition: (P) Alert    Hospitalization in the last 30 days (Readmission):  No    If yes, Readmission Assessment in CM Navigator will be completed.    Advance Directives:      Code Status: Prior   Patient's Primary Decision Maker is:      Primary Decision Maker: Kary Moore - Spouse - [phone]    Discharge Planning:    Patient lives with: (P) Spouse/Significant Other Type of Home: (P) House  Primary Care Giver: (P) Self  Patient Support Systems include: (P) Spouse/Significant Other, Children   Current Financial resources: (P) Medicare  Current community resources:    Current services prior to admission: (P) Home Bipap            Current DME:              Type of Home Care services:  (P) None    ADLS  Prior functional level: (P) Independent in ADLs/IADLs  Current functional level: (P) Independent in ADLs/IADLs    PT AM-PAC:   /24  OT AM-PAC:   /24    Family can provide assistance at DC: (P) Yes  Would you like Case Management to discuss the discharge plan with any other family members/significant others, and if so, who? (P) No  Plans to Return to Present Housing: (P) Yes  Other Identified Issues/Barriers to RETURNING to current housing:

## 2024-02-12 NOTE — ED NOTES
Pt brought by EMS to room 20.  He reports that his dog hasn't been doing well, and he needed to be put down today.  Pt states he's been stressed and didn't take his home meds this morning.  He reports he had a near syncopal episode today.  He reports hx of A-fib RVR and SVT.  He is alert, oriented, speaking in full sentences.  He denies pain at this time.  Cardiac workup in progress.  Lifepak on pt. Attending at bedside.

## 2024-02-12 NOTE — H&P
secondary to SVT due to subtherapeutic digoxin levels and acute stress of dog passing earlier this morning.  Patient currently in NSR s/p adenosine.    Principal Problem:    SVT (supraventricular tachycardia)  Active Problems:    Coronary artery disease involving native coronary artery of native heart without angina pectoris    Type 2 diabetes mellitus with other circulatory complication, without long-term current use of insulin (HCC)    Atrial fibrillation (HCC)    COPD exacerbation (HCC)    Primary hypertension    DEB treated with BiPAP  Resolved Problems:    * No resolved hospital problems. *      Principal Problem:  Paroxysmal SVT (supraventricular tachycardia)   -Home meds: Sotalol 120 BID, digoxin 0.125   -Digoxin level 0.04.    -Patient reports medication compliance, states he last took his medications yesterday   -Likely secondary to subtherapeutic digoxin levels and acute stressor of dog passing this morning   -Received 6 mg IV adenosine   -Will give 1 dose of IV digoxin 0.25 and resume home dose digoxin tomorrow morning.  Home dose sotalol resumed   -Cardiology consulted    CAP  COPD exacerbation   -Home meds: Breo Ellipta and montelukast   -WBC 14.3   -Pro-Andry 0.05.  Lactate 2.4-2.3.    -Respiratory panel, strep pneumo negative.  Follow-up Legionella  -Left lower lobe opacity seen on chest x-ray.  No URTI symptoms present  -Will start on Rocephin, azithromycin, and Symbicort  -Follow-up blood cultures    DEB on BiPAP    A-fib   -Recently diagnosed in November 2023   -No documented history of VTE   -[id number] 5.  On Eliquis 5 BID.  Resumed   -Home meds: Cardizem 120.  Resume    CAD s/p PCI  Dilated cardiomyopathy    -7/13/2023 echo: Decreased LVSF, EF <30.   -Post 12/14/2023 Lexiscan EF 57%   -Home meds: Lipitor 80, Aldactone 25    Hypertension   -Home meds: Lisinopril 20   -Lisinopril held due to soft pressures.  Will resume if blood pressure tolerates    DM2   -HbA1c 8.3   -Meds: Metformin 500

## 2024-02-12 NOTE — ED PROVIDER NOTES
[facility] ED     Emergency Department     Faculty Attestation    I performed a history and physical examination of the patient and discussed management with the resident. I reviewed the resident’s note and agree with the documented findings and plan of care. Any areas of disagreement are noted on the chart. I was personally present for the key portions of any procedures. I have documented in the chart those procedures where I was not present during the key portions. I have reviewed the emergency nurses triage note. I agree with the chief complaint, past medical history, past surgical history, allergies, medications, social and family history as documented unless otherwise noted below. For Physician Assistant/ Nurse Practitioner cases/documentation I have personally evaluated this patient and have completed at least one if not all key elements of the E/M (history, physical exam, and MDM). Additional findings are as noted.    Note Started: 1:13 PM EST    Called to bedside patient arriving from home by EMS for suspected A-fib.  Patient on arrival is awake alert and oriented provides him any history as well as from EMS.  Patient does have a history of both atrial fibrillation and SVT.  Complains of palpitations but no other chest pain.  EKGs as below showed SVT patient converted chemically with single dose of adenosine and is now resting comfortably.  Lungs clear and equal pulse now regular strong throughout we will proceed with workup possible admit.    EKG interpretation 1236: Supraventricular tachycardia with a ventricular to 144 normal intervals normal axis nonspecific ST depression laterally likely rate dependent.      Repeat EKG, 1241: Sinus rhythm with a rate of 90 prolonged SC at 216 otherwise normal intervals there is a left axis.  No acute ST or T changes.  Improved EKG      Critical Care    CRITICAL CARE: There was a high probability of clinically significant/life threatening

## 2024-02-12 NOTE — ED PROVIDER NOTES
[facility] ED  Emergency Department Encounter  Emergency Medicine Resident     Pt Name:Wallace Moore  MRN: [id number]  Birthdate 1950  Date of evaluation: 2/12/24  PCP:  Carole Norman MD  Note Started: 12:51 PM EST    CHIEF COMPLAINT       Chief Complaint   Patient presents with    Tachycardia       HISTORY OF PRESENT ILLNESS  (Location/Symptom, Timing/Onset, Context/Setting, Quality, Duration, Modifying Factors, Severity.)      Wallace Moore is a 73 y.o. male who presents with tachycardia.  Patient states he had to put his dog down earlier today and he has been under significant stress.  He notes he had a syncopal episode several hours prior to arrival, states he was seated in a recliner at the time and did not fall or hit his head.  He did have lightheadedness and tunnel vision prior to the syncopal event.  EMS notes patient significantly tachycardic to 150 prior to arrival, no medications given prior to arrival in ER.  Patient states he has a history of A-fib with RVR and SVT on digoxin and diltiazem as well as CAD s/p stent placement on Eliquis.  He states he takes all of his medications regularly and denies missing any doses of any of his medications.  He denies any current chest pain, lightheadedness, dizziness, changes in vision, shortness of breath, abdominal pain, nausea, vomiting, diarrhea, fevers, chills, numbness, tingling, weakness.    PAST MEDICAL / SURGICAL / SOCIAL / FAMILY HISTORY      has a past medical history of Ankylosing spondylitis ([facility]), Arthritis, Asthma not dependent on systemic steroids without complication, CAD (coronary artery disease), Hypertension, DEB treated with BiPAP, Pulmonary hypertension ([facility]), Silicosis ([facility]), and STEMI (ST elevation myocardial infarction) ([facility]).       has a past surgical history that includes joint replacement and Coronary angioplasty with stent (12/09/2017).      Social History     Socioeconomic History    Marital status:

## 2024-02-12 NOTE — ED NOTES
ED to inpatient nurses report      Chief Complaint:  Chief Complaint   Patient presents with    Tachycardia     Present to ED from: home    MOA:     LOC: alert and orientated to name, place, date  Mobility: Independent  Oxygen Baseline: none    Current needs required: none   Pending ED orders:   Present condition: stable    Why did the patient come to the ED? Near-syncopal episode  What is the plan? Cardiology consult  Any procedures or intervention occur? Adenosine for SVT.   Any safety concerns??    Mental Status:  Level of Consciousness: Alert (0)    Psych Assessment:   Psychosocial  Psychosocial (WDL): Within Defined Limits  Vital signs   Vitals:    02/12/24 1234 02/12/24 1245 02/12/24 1300 02/12/24 1328   BP: 100/68 99/67 98/65    Pulse: (!) 142 91 87 90   Resp: 15 19 13 21   Temp: 97.5 °F (36.4 °C)      TempSrc: Oral      SpO2: 96% 98% 93% 93%   Weight: 123.8 kg (273 lb)      Height: 1.778 m (5' 10\")           Vitals:  Patient Vitals for the past 24 hrs:   BP Temp Temp src Pulse Resp SpO2 Height Weight   02/12/24 1328 -- -- -- 90 21 93 % -- --   02/12/24 1300 98/65 -- -- 87 13 93 % -- --   02/12/24 1245 99/67 -- -- 91 19 98 % -- --   02/12/24 1234 100/68 97.5 °F (36.4 °C) Oral (!) 142 15 96 % 1.778 m (5' 10\") 123.8 kg (273 lb)      Visit Vitals  BP 98/65   Pulse 90   Temp 97.5 °F (36.4 °C) (Oral)   Resp 21   Ht 1.778 m (5' 10\")   Wt 123.8 kg (273 lb)   SpO2 93%   BMI 39.17 kg/m²        LDAs:   Peripheral IV 02/12/24 Right Wrist (Active)   Site Assessment Clean, dry & intact 02/12/24 1238       Peripheral IV 02/12/24 Left Wrist (Active)   Site Assessment Clean, dry & intact 02/12/24 1238       Ambulatory Status:  Presents to emergency department  because of falls (Syncope, seizure, or loss of consciousness): Yes, Age > 70: Yes, Altered Mental Status, Intoxication with alcohol or substance confusion (Disorientation, impaired judgment, poor safety awaremess, or inability to follow instructions): No, Impaired

## 2024-02-12 NOTE — ED PROVIDER NOTES
[facility] ED  Emergency Department  Faculty Sign-Out Addendum     Care of Wallace Moore was assumed from previous attending and is being seen for Tachycardia  .  The patient's initial evaluation and plan have been discussed with the prior provider who initially evaluated the patient.    Handoff taken on the following patient from prior Attending Physician:    Attestation    I was available and discussed any additional care issues that arose and coordinated the management plans with the resident(s) caring for the patient during my duty period. Any areas of disagreement with resident’s documentation of care or procedures are noted on the chart. I was personally present for the key portions of any/all procedures during my duty period. I have documented in the chart those procedures where I was not present during the key portions.      EMERGENCY DEPARTMENT COURSE / MEDICAL DECISION MAKING:       MEDICATIONS GIVEN:  Orders Placed This Encounter   Medications    adenosine (ADENOCARD) 6 MG/2ML injection     Kait Jackson: cabinet override    adenosine (ADENOCARD) injection 6 mg    cefTRIAXone (ROCEPHIN) 1,000 mg in sodium chloride 0.9 % 50 mL IVPB (mini-bag)     Order Specific Question:   Antimicrobial Indications     Answer:   Pneumonia (CAP)    azithromycin (ZITHROMAX) 500 mg in sodium chloride 0.9 % 250 mL IVPB ([id number])     Order Specific Question:   Antimicrobial Indications     Answer:   Pneumonia (CAP)    sodium chloride 0.9 % bolus 500 mL       LABS / RADIOLOGY:     Labs Reviewed   CBC WITH AUTO DIFFERENTIAL - Abnormal; Notable for the following components:       Result Value    WBC 14.3 (*)     RDW 15.1 (*)     Neutrophils % 78 (*)     Lymphocytes % 9 (*)     Immature Granulocytes 1 (*)     Neutrophils Absolute 11.31 (*)     Monocytes Absolute 1.35 (*)     All other components within normal limits   COMPREHENSIVE METABOLIC PANEL - Abnormal; Notable for the following components:

## 2024-02-13 PROBLEM — I10 PRIMARY HYPERTENSION: Status: ACTIVE | Noted: 2024-02-13

## 2024-02-13 PROBLEM — G47.33 OSA TREATED WITH BIPAP: Status: ACTIVE | Noted: 2024-02-13

## 2024-02-13 PROBLEM — J44.1 COPD EXACERBATION (HCC): Status: ACTIVE | Noted: 2024-02-13

## 2024-02-13 PROBLEM — T67.1XXA HEAT SYNCOPE: Status: ACTIVE | Noted: 2024-02-13

## 2024-02-13 PROBLEM — I42.9 CARDIOMYOPATHY (HCC): Status: ACTIVE | Noted: 2024-02-12

## 2024-02-13 LAB
ANION GAP SERPL CALCULATED.3IONS-SCNC: 9 MMOL/L (ref 9–17)
BASOPHILS # BLD: 0.07 K/UL (ref 0–0.2)
BASOPHILS NFR BLD: 1 % (ref 0–2)
BUN SERPL-MCNC: 13 MG/DL (ref 8–23)
CALCIUM SERPL-MCNC: 9.3 MG/DL (ref 8.6–10.4)
CHLORIDE SERPL-SCNC: 103 MMOL/L (ref 98–107)
CO2 SERPL-SCNC: 25 MMOL/L (ref 20–31)
CREAT SERPL-MCNC: 0.6 MG/DL (ref 0.7–1.2)
EKG ATRIAL RATE: 147 BPM
EKG ATRIAL RATE: 90 BPM
EKG P AXIS: 77 DEGREES
EKG P-R INTERVAL: 216 MS
EKG Q-T INTERVAL: 322 MS
EKG Q-T INTERVAL: 358 MS
EKG QRS DURATION: 106 MS
EKG QRS DURATION: 96 MS
EKG QTC CALCULATION (BAZETT): 437 MS
EKG QTC CALCULATION (BAZETT): 498 MS
EKG R AXIS: -23 DEGREES
EKG R AXIS: -46 DEGREES
EKG T AXIS: 42 DEGREES
EKG T AXIS: 49 DEGREES
EKG VENTRICULAR RATE: 144 BPM
EKG VENTRICULAR RATE: 90 BPM
EOSINOPHIL # BLD: 0.28 K/UL (ref 0–0.44)
EOSINOPHILS RELATIVE PERCENT: 3 % (ref 1–4)
ERYTHROCYTE [DISTWIDTH] IN BLOOD BY AUTOMATED COUNT: 15 % (ref 11.8–14.4)
EST. AVERAGE GLUCOSE BLD GHB EST-MCNC: 186 MG/DL
GFR SERPL CREATININE-BSD FRML MDRD: >60 ML/MIN/1.73M2
GLUCOSE BLD-MCNC: 127 MG/DL (ref 75–110)
GLUCOSE BLD-MCNC: 144 MG/DL (ref 75–110)
GLUCOSE BLD-MCNC: 187 MG/DL (ref 75–110)
GLUCOSE BLD-MCNC: 193 MG/DL (ref 75–110)
GLUCOSE SERPL-MCNC: 155 MG/DL (ref 70–99)
HBA1C MFR BLD: 8.1 % (ref 4–6)
HCT VFR BLD AUTO: 40.8 % (ref 40.7–50.3)
HGB BLD-MCNC: 13.3 G/DL (ref 13–17)
IMM GRANULOCYTES # BLD AUTO: 0.07 K/UL (ref 0–0.3)
LACTIC ACID, WHOLE BLOOD: 1.9 MMOL/L (ref 0.7–2.1)
LYMPHOCYTES NFR BLD: 1.03 K/UL (ref 1.1–3.7)
LYMPHOCYTES RELATIVE PERCENT: 12 % (ref 24–43)
MCH RBC QN AUTO: 27.5 PG (ref 25.2–33.5)
MCHC RBC AUTO-ENTMCNC: 32.6 G/DL (ref 28.4–34.8)
MCV RBC AUTO: 84.5 FL (ref 82.6–102.9)
MONOCYTES NFR BLD: 0.78 K/UL (ref 0.1–1.2)
MONOCYTES NFR BLD: 9 % (ref 3–12)
NEUTROPHILS NFR BLD: 73 % (ref 36–65)
NEUTS SEG NFR BLD: 6.11 K/UL (ref 1.5–8.1)
NRBC BLD-RTO: 0 PER 100 WBC
PLATELET # BLD AUTO: 212 K/UL (ref 138–453)
PMV BLD AUTO: 10.8 FL (ref 8.1–13.5)
POTASSIUM SERPL-SCNC: 4.3 MMOL/L (ref 3.7–5.3)
RBC # BLD AUTO: 4.83 M/UL (ref 4.21–5.77)
RBC # BLD: ABNORMAL 10*6/UL
S PNEUM AG SPEC QL: NEGATIVE
SODIUM SERPL-SCNC: 137 MMOL/L (ref 135–144)
SPECIMEN SOURCE: NORMAL
TSH SERPL DL<=0.05 MIU/L-ACNC: 0.8 UIU/ML (ref 0.3–5)
WBC OTHER # BLD: 8.3 K/UL (ref 3.5–11.3)

## 2024-02-13 PROCEDURE — 83036 HEMOGLOBIN GLYCOSYLATED A1C: CPT

## 2024-02-13 PROCEDURE — 93010 ELECTROCARDIOGRAM REPORT: CPT | Performed by: INTERNAL MEDICINE

## 2024-02-13 PROCEDURE — 99223 1ST HOSP IP/OBS HIGH 75: CPT | Performed by: SPECIALIST

## 2024-02-13 PROCEDURE — 83605 ASSAY OF LACTIC ACID: CPT

## 2024-02-13 PROCEDURE — 2580000003 HC RX 258

## 2024-02-13 PROCEDURE — 99233 SBSQ HOSP IP/OBS HIGH 50: CPT | Performed by: INTERNAL MEDICINE

## 2024-02-13 PROCEDURE — 36415 COLL VENOUS BLD VENIPUNCTURE: CPT

## 2024-02-13 PROCEDURE — 6360000002 HC RX W HCPCS

## 2024-02-13 PROCEDURE — 82947 ASSAY GLUCOSE BLOOD QUANT: CPT

## 2024-02-13 PROCEDURE — 84443 ASSAY THYROID STIM HORMONE: CPT

## 2024-02-13 PROCEDURE — 85025 COMPLETE CBC W/AUTO DIFF WBC: CPT

## 2024-02-13 PROCEDURE — 6370000000 HC RX 637 (ALT 250 FOR IP)

## 2024-02-13 PROCEDURE — 94660 CPAP INITIATION&MGMT: CPT

## 2024-02-13 PROCEDURE — 93005 ELECTROCARDIOGRAM TRACING: CPT | Performed by: SPECIALIST

## 2024-02-13 PROCEDURE — 99222 1ST HOSP IP/OBS MODERATE 55: CPT | Performed by: INTERNAL MEDICINE

## 2024-02-13 PROCEDURE — 2060000000 HC ICU INTERMEDIATE R&B

## 2024-02-13 PROCEDURE — 94640 AIRWAY INHALATION TREATMENT: CPT

## 2024-02-13 PROCEDURE — 6370000000 HC RX 637 (ALT 250 FOR IP): Performed by: SPECIALIST

## 2024-02-13 PROCEDURE — 80048 BASIC METABOLIC PNL TOTAL CA: CPT

## 2024-02-13 RX ORDER — INSULIN LISPRO 100 [IU]/ML
0-4 INJECTION, SOLUTION INTRAVENOUS; SUBCUTANEOUS NIGHTLY
Status: DISCONTINUED | OUTPATIENT
Start: 2024-02-13 | End: 2024-02-14 | Stop reason: HOSPADM

## 2024-02-13 RX ORDER — INSULIN LISPRO 100 [IU]/ML
0-8 INJECTION, SOLUTION INTRAVENOUS; SUBCUTANEOUS
Status: DISCONTINUED | OUTPATIENT
Start: 2024-02-13 | End: 2024-02-14 | Stop reason: HOSPADM

## 2024-02-13 RX ORDER — METOPROLOL SUCCINATE 50 MG/1
50 TABLET, EXTENDED RELEASE ORAL DAILY
Status: DISCONTINUED | OUTPATIENT
Start: 2024-02-13 | End: 2024-02-14 | Stop reason: HOSPADM

## 2024-02-13 RX ORDER — SPIRONOLACTONE 25 MG/1
12.5 TABLET ORAL DAILY
Status: DISCONTINUED | OUTPATIENT
Start: 2024-02-13 | End: 2024-02-14 | Stop reason: HOSPADM

## 2024-02-13 RX ORDER — BUDESONIDE AND FORMOTEROL FUMARATE DIHYDRATE 160; 4.5 UG/1; UG/1
2 AEROSOL RESPIRATORY (INHALATION)
Status: DISCONTINUED | OUTPATIENT
Start: 2024-02-13 | End: 2024-02-14 | Stop reason: HOSPADM

## 2024-02-13 RX ADMIN — SODIUM CHLORIDE 75 ML/HR: 9 INJECTION, SOLUTION INTRAVENOUS at 05:48

## 2024-02-13 RX ADMIN — DILTIAZEM HYDROCHLORIDE 120 MG: 120 CAPSULE, COATED, EXTENDED RELEASE ORAL at 08:21

## 2024-02-13 RX ADMIN — ASPIRIN 81 MG: 81 TABLET, CHEWABLE ORAL at 08:21

## 2024-02-13 RX ADMIN — SOTALOL HYDROCHLORIDE 120 MG: 120 TABLET ORAL at 08:26

## 2024-02-13 RX ADMIN — AZITHROMYCIN MONOHYDRATE 500 MG: 500 INJECTION, POWDER, LYOPHILIZED, FOR SOLUTION INTRAVENOUS at 08:42

## 2024-02-13 RX ADMIN — ACETAMINOPHEN 650 MG: 325 TABLET ORAL at 07:41

## 2024-02-13 RX ADMIN — SPIRONOLACTONE 12.5 MG: 25 TABLET ORAL at 09:34

## 2024-02-13 RX ADMIN — BUSPIRONE HYDROCHLORIDE 10 MG: 10 TABLET ORAL at 08:21

## 2024-02-13 RX ADMIN — DIGOXIN 125 MCG: 125 TABLET ORAL at 08:22

## 2024-02-13 RX ADMIN — SODIUM CHLORIDE, PRESERVATIVE FREE 10 ML: 5 INJECTION INTRAVENOUS at 20:33

## 2024-02-13 RX ADMIN — APIXABAN 5 MG: 5 TABLET, FILM COATED ORAL at 08:21

## 2024-02-13 RX ADMIN — METOPROLOL SUCCINATE 50 MG: 50 TABLET, FILM COATED, EXTENDED RELEASE ORAL at 14:36

## 2024-02-13 RX ADMIN — DULOXETINE HYDROCHLORIDE 120 MG: 30 CAPSULE, DELAYED RELEASE ORAL at 08:21

## 2024-02-13 RX ADMIN — SODIUM CHLORIDE, PRESERVATIVE FREE 5 ML: 5 INJECTION INTRAVENOUS at 08:22

## 2024-02-13 RX ADMIN — ATORVASTATIN CALCIUM 80 MG: 80 TABLET, FILM COATED ORAL at 20:33

## 2024-02-13 RX ADMIN — Medication 1000 MG: at 09:31

## 2024-02-13 RX ADMIN — BUDESONIDE AND FORMOTEROL FUMARATE DIHYDRATE 2 PUFF: 160; 4.5 AEROSOL RESPIRATORY (INHALATION) at 09:26

## 2024-02-13 RX ADMIN — SOTALOL HYDROCHLORIDE 120 MG: 120 TABLET ORAL at 20:33

## 2024-02-13 RX ADMIN — BUSPIRONE HYDROCHLORIDE 10 MG: 10 TABLET ORAL at 20:33

## 2024-02-13 NOTE — CONSULTS
73 years old gentleman known to have obstructive coronary artery disease and dilated ischemic cardiomyopathy.  Admitted yesterday after an episode of syncope that was preceded by palpitation.  It was witnessed by his wife.  The wife said that he was out briefly and he responded to her slapping him.  He was aware of the palpitation before the fainting and it continued after he recovered.  No injuries.  He was seated in a chair when it happened.  There is a similar syncopal event about 2 to 3 weeks ago while he was seated at the breakfast table working on his computer he with mild prodrome he felt that he was going to faint and he passed out briefly from what he said.  It was not witnessed.  The wife became aware of it later.  He does not remember having arrhythmias before or after like palpitation or any rhythm issues.  Again he did not hurt himself.    Yesterday he was found to be in narrow QRS tachycardia rate of 144 bpm.  Short RP type tachycardia.  Reportedly responded to 6 mg of IV adenosine.  No evidence of preexcitation before or after the termination.    The palpitation has been going on and he is aware of SVT for 40 years or about.    He was supposed to be on Cardizem  mg p.o. daily, digoxin 0.125 mg p.o. daily and sotalol 120 mg p.o. twice daily despite the 3 different medications he is continuing to have episodes of tachycardia.  In general his episodes of tachycardia with the medications are milder and less frequent but they still happening.  They are not usually associated with syncope.    He is known to have atrial fibrillation earlier in the summer he was cardioverted and has been on oral anticoagulation for long time most recently Eliquis and he continues to take him.    He is known to have obstructive coronary artery disease status post intervention in the past.    The most recent echo available was 1 done earlier this past summer with ejection fraction of 30% a significant decline from the

## 2024-02-13 NOTE — CONSULTS
Attestation signed by      Attending Physician Statement:    I have discussed the care of  Wallace Moore , including pertinent history and exam findings, with the Cardiology fellow/resident.     I have seen and examined the patient and the key elements of all parts of the encounter have been performed by me. I agree with the assessment, plan and orders as documented by the fellow/resident, after I modified exam findings and plan of treatments, and the final version is my approved version of the assessment.     Additional Comments:     Chronic HFrEF. Ischemic CMP. Single vessel CAD with hx of PCI-pLAD (patent on last angiogram in 2021). Admitted with syncope proceeded by tachycardia. 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 Patient has prior hx of PAF s/p CV and on sotalol, Cardizem and digoxin. Denies any anginal symptoms. No evidence of ACS.   In the setting of ischemic CMP and syncope, I have recommended inpatient EP evaluation for EPS/RFA. Will repeat limited TTE to reassess LVEF and consideration of ICD. Resume ASA, eliquis, sotalol, digoxin, farxiga and entresto.        [facility]   Admission Note                 Patient's name:  Wallace Moore  Medical Record Number: [id number]  Patient's account/billing number: [id number]  Patient's YOB: 1950  Age: 73 y.o.  Date of Admission: 2/12/2024 12:32 PM  Date of History and Physical Examination: 2/13/2024  Primary Care Physician: Carole Norman MD    Code Status: Full Code    CHIEF COMPLAINT: Loss of consciousness    CONSULT REASON: Paroxysmal SVT requiring adenosine    HISTORY OF PRESENT ILLNESS:      History was obtained from chart review and the patient.      Wallace Moore is a 73 y.o. with past medical history significant for dilated cardiomyopathy with EF of 30%, CAD status post patent stent in LAD with jailed stent in diagonal, recent diagnosis of

## 2024-02-13 NOTE — PLAN OF CARE
Problem: Discharge Planning  Goal: Discharge to home or other facility with appropriate resources  2/13/2024 0514 by Sharita Singh RN  Outcome: Progressing     Problem: Safety - Adult  Goal: Free from fall injury  2/13/2024 0514 by Sharita Singh RN  Outcome: Progressing     Problem: Respiratory - Adult  Goal: Achieves optimal ventilation and oxygenation  Outcome: Progressing     Problem: Cardiovascular - Adult  Goal: Maintains optimal cardiac output and hemodynamic stability  Outcome: Progressing  Goal: Absence of cardiac dysrhythmias or at baseline  Outcome: Progressing     Problem: Metabolic/Fluid and Electrolytes - Adult  Goal: Electrolytes maintained within normal limits  Outcome: Progressing  Goal: Glucose maintained within prescribed range  Outcome: Progressing

## 2024-02-14 ENCOUNTER — APPOINTMENT (OUTPATIENT)
Age: 74
DRG: 308 | End: 2024-02-14
Payer: MEDICARE

## 2024-02-14 ENCOUNTER — APPOINTMENT (OUTPATIENT)
Dept: GENERAL RADIOLOGY | Age: 74
DRG: 308 | End: 2024-02-14
Payer: MEDICARE

## 2024-02-14 VITALS
TEMPERATURE: 98 F | SYSTOLIC BLOOD PRESSURE: 147 MMHG | BODY MASS INDEX: 40.24 KG/M2 | DIASTOLIC BLOOD PRESSURE: 80 MMHG | HEART RATE: 59 BPM | RESPIRATION RATE: 16 BRPM | WEIGHT: 281.09 LBS | HEIGHT: 70 IN | OXYGEN SATURATION: 96 %

## 2024-02-14 LAB
ANION GAP SERPL CALCULATED.3IONS-SCNC: 10 MMOL/L (ref 9–17)
BASOPHILS # BLD: 0.05 K/UL (ref 0–0.2)
BASOPHILS NFR BLD: 1 % (ref 0–2)
BUN SERPL-MCNC: 13 MG/DL (ref 8–23)
CALCIUM SERPL-MCNC: 9 MG/DL (ref 8.6–10.4)
CHLORIDE SERPL-SCNC: 98 MMOL/L (ref 98–107)
CO2 SERPL-SCNC: 25 MMOL/L (ref 20–31)
CREAT SERPL-MCNC: 0.5 MG/DL (ref 0.7–1.2)
ECHO BSA: 2.47 M2
ECHO LA AREA 4C: 17.6 CM2
ECHO LA MAJOR AXIS: 5.5 CM
ECHO LA VOL MOD A4C: 47 ML (ref 18–58)
ECHO LA VOLUME INDEX MOD A4C: 20 ML/M2 (ref 16–34)
ECHO LV EDV A2C: 27 ML
ECHO LV EDV A4C: 65 ML
ECHO LV EDV INDEX A4C: 27 ML/M2
ECHO LV EDV NDEX A2C: 11 ML/M2
ECHO LV EJECTION FRACTION A2C: 55 %
ECHO LV EJECTION FRACTION A4C: 65 %
ECHO LV EJECTION FRACTION BIPLANE: 60 % (ref 55–100)
ECHO LV ESV A2C: 12 ML
ECHO LV ESV A4C: 23 ML
ECHO LV ESV INDEX A2C: 5 ML/M2
ECHO LV ESV INDEX A4C: 10 ML/M2
ECHO RA AREA 4C: 14.9 CM2
EKG ATRIAL RATE: 70 BPM
EKG P AXIS: 99 DEGREES
EKG P-R INTERVAL: 222 MS
EKG QRS DURATION: 108 MS
EKG QTC CALCULATION (BAZETT): 449 MS
EKG R AXIS: -33 DEGREES
EKG T AXIS: 7 DEGREES
EKG VENTRICULAR RATE: 70 BPM
EOSINOPHIL # BLD: 0.3 K/UL (ref 0–0.44)
EOSINOPHILS RELATIVE PERCENT: 3 % (ref 1–4)
ERYTHROCYTE [DISTWIDTH] IN BLOOD BY AUTOMATED COUNT: 15 % (ref 11.8–14.4)
GFR SERPL CREATININE-BSD FRML MDRD: >60 ML/MIN/1.73M2
GLUCOSE BLD-MCNC: 133 MG/DL (ref 75–110)
GLUCOSE BLD-MCNC: 186 MG/DL (ref 75–110)
GLUCOSE BLD-MCNC: 222 MG/DL (ref 75–110)
GLUCOSE BLD-MCNC: 267 MG/DL (ref 75–110)
GLUCOSE SERPL-MCNC: 199 MG/DL (ref 70–99)
HCT VFR BLD AUTO: 41.9 % (ref 40.7–50.3)
HGB BLD-MCNC: 12.8 G/DL (ref 13–17)
IMM GRANULOCYTES # BLD AUTO: 0.04 K/UL (ref 0–0.3)
IMM GRANULOCYTES NFR BLD: 0 %
LYMPHOCYTES NFR BLD: 1.16 K/UL (ref 1.1–3.7)
LYMPHOCYTES RELATIVE PERCENT: 12 % (ref 24–43)
MCH RBC QN AUTO: 26.8 PG (ref 25.2–33.5)
MCHC RBC AUTO-ENTMCNC: 30.5 G/DL (ref 28.4–34.8)
MCV RBC AUTO: 87.8 FL (ref 82.6–102.9)
MONOCYTES NFR BLD: 0.75 K/UL (ref 0.1–1.2)
MONOCYTES NFR BLD: 8 % (ref 3–12)
NEUTROPHILS NFR BLD: 76 % (ref 36–65)
NEUTS SEG NFR BLD: 7.67 K/UL (ref 1.5–8.1)
NRBC BLD-RTO: 0 PER 100 WBC
PLATELET # BLD AUTO: 221 K/UL (ref 138–453)
PMV BLD AUTO: 10 FL (ref 8.1–13.5)
POTASSIUM SERPL-SCNC: 4.3 MMOL/L (ref 3.7–5.3)
RBC # BLD AUTO: 4.77 M/UL (ref 4.21–5.77)
RBC # BLD: ABNORMAL 10*6/UL
SODIUM SERPL-SCNC: 133 MMOL/L (ref 135–144)
WBC OTHER # BLD: 10 K/UL (ref 3.5–11.3)

## 2024-02-14 PROCEDURE — 94640 AIRWAY INHALATION TREATMENT: CPT

## 2024-02-14 PROCEDURE — 6370000000 HC RX 637 (ALT 250 FOR IP): Performed by: SPECIALIST

## 2024-02-14 PROCEDURE — 2580000003 HC RX 258

## 2024-02-14 PROCEDURE — 99232 SBSQ HOSP IP/OBS MODERATE 35: CPT | Performed by: INTERNAL MEDICINE

## 2024-02-14 PROCEDURE — 80048 BASIC METABOLIC PNL TOTAL CA: CPT

## 2024-02-14 PROCEDURE — 6370000000 HC RX 637 (ALT 250 FOR IP)

## 2024-02-14 PROCEDURE — 99232 SBSQ HOSP IP/OBS MODERATE 35: CPT | Performed by: SPECIALIST

## 2024-02-14 PROCEDURE — 94660 CPAP INITIATION&MGMT: CPT

## 2024-02-14 PROCEDURE — 82947 ASSAY GLUCOSE BLOOD QUANT: CPT

## 2024-02-14 PROCEDURE — 85025 COMPLETE CBC W/AUTO DIFF WBC: CPT

## 2024-02-14 PROCEDURE — 99233 SBSQ HOSP IP/OBS HIGH 50: CPT | Performed by: SURGERY

## 2024-02-14 PROCEDURE — 71045 X-RAY EXAM CHEST 1 VIEW: CPT

## 2024-02-14 PROCEDURE — 93308 TTE F-UP OR LMTD: CPT

## 2024-02-14 PROCEDURE — 36415 COLL VENOUS BLD VENIPUNCTURE: CPT

## 2024-02-14 PROCEDURE — 6360000002 HC RX W HCPCS

## 2024-02-14 RX ORDER — METOPROLOL SUCCINATE 50 MG/1
50 TABLET, EXTENDED RELEASE ORAL DAILY
Qty: 30 TABLET | Refills: 3 | Status: SHIPPED | OUTPATIENT
Start: 2024-02-15

## 2024-02-14 RX ADMIN — Medication 1000 MG: at 09:35

## 2024-02-14 RX ADMIN — ASPIRIN 81 MG: 81 TABLET, CHEWABLE ORAL at 09:34

## 2024-02-14 RX ADMIN — SPIRONOLACTONE 12.5 MG: 25 TABLET ORAL at 09:34

## 2024-02-14 RX ADMIN — BUSPIRONE HYDROCHLORIDE 10 MG: 10 TABLET ORAL at 09:35

## 2024-02-14 RX ADMIN — BUDESONIDE AND FORMOTEROL FUMARATE DIHYDRATE 2 PUFF: 160; 4.5 AEROSOL RESPIRATORY (INHALATION) at 07:12

## 2024-02-14 RX ADMIN — SODIUM CHLORIDE, PRESERVATIVE FREE 10 ML: 5 INJECTION INTRAVENOUS at 09:35

## 2024-02-14 RX ADMIN — DULOXETINE HYDROCHLORIDE 120 MG: 30 CAPSULE, DELAYED RELEASE ORAL at 09:35

## 2024-02-14 RX ADMIN — AZITHROMYCIN MONOHYDRATE 500 MG: 500 INJECTION, POWDER, LYOPHILIZED, FOR SOLUTION INTRAVENOUS at 11:27

## 2024-02-14 RX ADMIN — METOPROLOL SUCCINATE 50 MG: 50 TABLET, FILM COATED, EXTENDED RELEASE ORAL at 09:34

## 2024-02-14 RX ADMIN — DIGOXIN 125 MCG: 125 TABLET ORAL at 09:35

## 2024-02-14 RX ADMIN — INSULIN LISPRO 2 UNITS: 100 INJECTION, SOLUTION INTRAVENOUS; SUBCUTANEOUS at 13:11

## 2024-02-14 RX ADMIN — SOTALOL HYDROCHLORIDE 120 MG: 120 TABLET ORAL at 11:04

## 2024-02-14 NOTE — PROGRESS NOTES
[facility]  Internal Medicine Teaching Residency Program  Inpatient Daily Progress Note  ______________________________________________________________________________    Patient: Wallace Moore  YOB: 1950   MRN:[id number]    Acct: [id number]     Room: 0537/0537-01  Admit date: 2/12/2024  Today's date: 02/13/24  Number of days in the hospital: 1    SUBJECTIVE   Admitting Diagnosis: SVT (supraventricular tachycardia)  CC: Palpitations, syncopal event  Pt examined at bedside. Chart & results reviewed.     -No acute events overnight  - Patient has BiPAP overnight for DEB  - Afebrile.  Hemodynamically stable  -Appears to be in normal sinus rhythm on bedside telemetry  -Patient reports no issues this morning.  Palpitations have resolved    Review of Systems   Constitutional:  Negative for chills, fatigue and fever.   HENT:  Negative for rhinorrhea and sore throat.    Respiratory:  Negative for cough and shortness of breath.    Cardiovascular:  Negative for chest pain and palpitations.   Gastrointestinal:  Negative for nausea and vomiting.   Neurological:  Negative for dizziness, weakness and light-headedness.       BRIEF HISTORY     The patient is a pleasant 73 y.o. male with a PMH of dilated cardiomyopathy, CAD s/p PCI to LAD, A. Fib on with Eliquis, SVT, DEB on biPAP, HTN, hyperlipidemia, DM2 chronic silicosis, and ankylosing spondylitis presents via EMS with a chief complaint of loss of consciousness.  He reports lightheadedness, dizziness, palpitations, and diaphoresis that started this morning while sitting in the chair. States that his dog passed away earlier today, and he has been emotionally disturbed since. Shortly after symptom onset, patient had a single syncopal episode in which he lost consciousness while sitting in the chair, witnessed by his wife. Denies any fall or trauma to the head. Per wife, patient had lost consciousness for a maximum of 
  [facility]  Progress Note                   Date:   2/14/2024  Patient name: Wallace Moore  Date of admission:  2/12/2024 12:32 PM  MRN:   [id number]  YOB: 1950  PCP: Carole Norman MD    Reason for Admission: SVT (supraventricular tachycardia) [I47.10]    Subjective:       Clinical Changes /Abnormalities:Patient seen and examined. Denies chest pain or shortness of breath. Tele/vitals/labs reviewed .     Review of Systems    Medications:   Scheduled Meds:   budesonide-formoterol  2 puff Inhalation BID RT    spironolactone  12.5 mg Oral Daily    metoprolol succinate  50 mg Oral Daily    insulin lispro  0-8 Units SubCUTAneous TID WC    insulin lispro  0-4 Units SubCUTAneous Nightly    sodium chloride flush  5-40 mL IntraVENous 2 times per day    aspirin  81 mg Oral Daily    atorvastatin  80 mg Oral Nightly    busPIRone  10 mg Oral BID    digoxin  125 mcg Oral Daily    DULoxetine  120 mg Oral Daily    sotalol  120 mg Oral BID    cefTRIAXone (ROCEPHIN) IV  1,000 mg IntraVENous Q24H     Continuous Infusions:   sodium chloride      dextrose       CBC:   Recent Labs     02/12/24  1244 02/13/24  0713 02/14/24  0801   WBC 14.3* 8.3 10.0   HGB 14.3 13.3 12.8*    212 221     BMP:    Recent Labs     02/12/24  1244 02/13/24  0713 02/14/24  0801    137 133*   K 4.2 4.3 4.3    103 98   CO2 22 25 25   BUN 14 13 13   CREATININE 0.8 0.6* 0.5*   GLUCOSE 236* 155* 199*     Hepatic:  Recent Labs     02/12/24  1244   AST 13   ALT 20   BILITOT 0.6   ALKPHOS 380*     Troponin:   Recent Labs     02/12/24  1244 02/12/24  1412   TROPHS 28* 21     BNP: No results for input(s): \"BNP\" in the last 72 hours.  Lipids: No results for input(s): \"CHOL\", \"HDL\" in the last 72 hours.    Invalid input(s): \"LDLCALCU\"  INR: No results for input(s): \"INR\" in the last 72 hours.    Last EKG:   SVT with ventricular rate of 144     Last Echo: 7/12/2023  Technically difficult study  Echo contrast 
Confirmed with Navneet Tarango's office, patient has been getting entresto samples from the office, that is why its not showing up in the dispense report. He is not taking the lisinopril anymore. We will order it to his pharmacy   ([facility]) as his insurance is going to cover now.      Maria Teresa Ch  PGY-3  Internal Medicine  [place], [place]   3:45 PM 2/14/2024   
Congestive Heart Failure Education completed and charted. CHF booklet given. Patient was receptive to education.    Discussed the  importance of medication compliance.    Discussed the importance of a heart healthy diet. Discussed 2000 mg sodium-restricted daily diet.  Patient instructed to limit fluid intake to  1.5 to 2 liters per day.    Patient instructed to weigh self at the same time of each day each morning, reinforced teaching to monitor for 3-5 lb weight increase over 1-2 days notify physician if change noted.      Signs and symptoms of CHF discussed with patient, such as feeling more tired than normal, feeling short of breath, coughing that increases when lying down, sudden weight gain, swelling of the feet, legs or belly.  Patient verbalized understanding to notify physician office if these symptoms occur.    Patient's cardiologist is Dr Cabrera who he see's 2x year.   
Echo was done and reported earlier today with ejection fraction reported to be more than 50%.  The gentleman is not a candidate for ICD anymore.  I am still concerned about his syncopal event and the documented SVT.    He was scheduled to have ICD tomorrow afternoon but now that the ejection fraction is reported preserved we will going to consider it.  I offered the patient in the presence of his wife to do the EP and ablation tomorrow he wants to have it done as an outpatient.  I stressed the fact that it needs to be done soon with the history of him having repeated episodes of syncope and repeated episodes of SVTs.  He tells me he understands and he will do that.    Will going to be doing the EP workup then as an outpatient by the patient to request.    From my point then he can be discharged home and will follow-up in the office and very short time.    No change in the exam.  Plans were discussed with patient and his family.  End of dictation  
Education material given to pt.  reviewed and discussed with pt.   
I did not see, evaluate, or participate in the care of this patient.  I signed up for this patient in error.     Keila Thao DO  Emergency Medicine Resident   02/12/24 12:40 PM      
NON INVASIVE VENTILATION  PROVIDE OPTIMAL VENTILATION/ACCEPTABLE SP02  IMPLEMENT NON INVASIVE VENTILATION PROTOCOL  ASSESSMENT SKIN INTEGRITY  PATIENT EDUCATION AS NEEDED  BIPAP AS NEEDED  
Pt would like to be discharged by Thursday. Attempted to get pt into echo today and will not be done today.   
[facility]    Admission Medication Reconciliation       The patient's list of current home medications has been reviewed.     Source(s) of information: dispense report, provider notes    Based on information provided by the above source(s), I have updated the patient's home med list as described below.     Please review the ACTION REQUESTED section of this note below for any discrepancies on current hospital orders.      I changed or updated the following medications on the patient's home medication list:  Removed N/A     Added spironolactone     Adjusted   N/A   Other Notes Pt cannot afford dapagliflozin       Please feel free to call me with any questions about this encounter. Thank you.    Thank you  Lesvia Lawson, PharmD, [facility]  Inpatient Clinical Pharmacist  [phone]      Electronically signed by Lul Cleveland on 2/12/2024 at 3:25 PM     
Reviewed DC instructions that included, medication changes, follow ups. Pt, spouse and dtr present for instructions and voiced understanding. IV's removed, pt alert & oriented with all belongings via wheelchair to private car.   
Abdomen is soft.      Tenderness: There is no abdominal tenderness. There is no guarding.   Musculoskeletal:         General: No tenderness.      Right lower leg: No edema.      Left lower leg: No edema.   Skin:     General: Skin is warm and dry.   Neurological:      Mental Status: He is alert.           Medications:  Scheduled Medications:    budesonide-formoterol  2 puff Inhalation BID RT    spironolactone  12.5 mg Oral Daily    metoprolol succinate  50 mg Oral Daily    insulin lispro  0-8 Units SubCUTAneous TID WC    insulin lispro  0-4 Units SubCUTAneous Nightly    sodium chloride flush  5-40 mL IntraVENous 2 times per day    aspirin  81 mg Oral Daily    atorvastatin  80 mg Oral Nightly    busPIRone  10 mg Oral BID    digoxin  125 mcg Oral Daily    DULoxetine  120 mg Oral Daily    sotalol  120 mg Oral BID    cefTRIAXone (ROCEPHIN) IV  1,000 mg IntraVENous Q24H    azithromycin  500 mg IntraVENous Q24H     Continuous Infusions:    sodium chloride      dextrose       PRN Medicationssodium chloride flush, 5-40 mL, PRN  sodium chloride, , PRN  potassium chloride, 40 mEq, PRN   Or  potassium alternative oral replacement, 40 mEq, PRN   Or  potassium chloride, 10 mEq, PRN  magnesium sulfate, 2,000 mg, PRN  ondansetron, 4 mg, Q6H PRN  polyethylene glycol, 17 g, Daily PRN  acetaminophen, 650 mg, Q6H PRN   Or  acetaminophen, 650 mg, Q6H PRN  glucose, 4 tablet, PRN  dextrose bolus, 125 mL, PRN   Or  dextrose bolus, 250 mL, PRN  glucagon (rDNA), 1 mg, PRN  dextrose, , Continuous PRN        Diagnostic Labs:  CBC:   Recent Labs     02/12/24  1244 02/13/24  0713 02/14/24  0801   WBC 14.3* 8.3 10.0   RBC 5.13 4.83 4.77   HGB 14.3 13.3 12.8*   HCT 43.6 40.8 41.9   MCV 85.0 84.5 87.8   RDW 15.1* 15.0* 15.0*    212 221       BMP:   Recent Labs     02/12/24  1244 02/13/24  0713 02/14/24  0801    137 133*   K 4.2 4.3 4.3    103 98   CO2 22 25 25   BUN 14 13 13   CREATININE 0.8 0.6* 0.5*       BNP: No results for

## 2024-02-14 NOTE — PLAN OF CARE
Problem: Discharge Planning  Goal: Discharge to home or other facility with appropriate resources  Outcome: Progressing     Problem: Safety - Adult  Goal: Free from fall injury  Outcome: Progressing     Problem: Respiratory - Adult  Goal: Achieves optimal ventilation and oxygenation  Outcome: Progressing     Problem: Cardiovascular - Adult  Goal: Maintains optimal cardiac output and hemodynamic stability  Outcome: Progressing  Goal: Absence of cardiac dysrhythmias or at baseline  Outcome: Progressing     Problem: Metabolic/Fluid and Electrolytes - Adult  Goal: Electrolytes maintained within normal limits  Outcome: Progressing  Goal: Glucose maintained within prescribed range  Outcome: Progressing     Problem: Chronic Conditions and Co-morbidities  Goal: Patient's chronic conditions and co-morbidity symptoms are monitored and maintained or improved  Outcome: Progressing

## 2024-02-14 NOTE — PLAN OF CARE
Problem: Discharge Planning  Goal: Discharge to home or other facility with appropriate resources  2/14/2024 1747 by Germania Winchester RN  Outcome: Adequate for Discharge  Flowsheets (Taken 2/14/2024 0800)  Discharge to home or other facility with appropriate resources: Identify barriers to discharge with patient and caregiver  2/14/2024 0659 by Sharita Singh RN  Outcome: Progressing     Problem: Safety - Adult  Goal: Free from fall injury  2/14/2024 1747 by Germania Winchester RN  Outcome: Adequate for Discharge  2/14/2024 0659 by Sharita Singh RN  Outcome: Progressing     Problem: Respiratory - Adult  Goal: Achieves optimal ventilation and oxygenation  2/14/2024 1747 by Germania Winchester RN  Outcome: Adequate for Discharge  2/14/2024 0659 by Sharita Singh RN  Outcome: Progressing     Problem: Cardiovascular - Adult  Goal: Maintains optimal cardiac output and hemodynamic stability  2/14/2024 1747 by Germania Winchester RN  Outcome: Adequate for Discharge  Flowsheets (Taken 2/14/2024 0800)  Maintains optimal cardiac output and hemodynamic stability: Monitor blood pressure and heart rate  2/14/2024 0659 by Sharita Singh RN  Outcome: Progressing  Goal: Absence of cardiac dysrhythmias or at baseline  2/14/2024 1747 by Germania Winchester RN  Outcome: Adequate for Discharge  Flowsheets (Taken 2/14/2024 0800)  Absence of cardiac dysrhythmias or at baseline: Monitor cardiac rate and rhythm  2/14/2024 0659 by Sharita Singh RN  Outcome: Progressing     Problem: Metabolic/Fluid and Electrolytes - Adult  Goal: Electrolytes maintained within normal limits  2/14/2024 1747 by Germania Winchester RN  Outcome: Adequate for Discharge  Flowsheets (Taken 2/14/2024 0800)  Electrolytes maintained within normal limits: Monitor labs and assess patient for signs and symptoms of electrolyte imbalances  2/14/2024 0659 by Sharita Singh RN  Outcome: Progressing  Goal: Glucose maintained within prescribed

## 2024-02-14 NOTE — CARE COORDINATION
Discharge Report    [facility]  Clinical Case Management Department  Written by: Maru Torre RN    Patient Name: Wallace Moore  Attending Provider: Roger Evans MD  Admit Date: 2024 12:32 PM  MRN: [id number]  Account: [id number]                     : 1950  Discharge Date:       Disposition: home    Maru Torre RN

## 2024-02-14 NOTE — DISCHARGE INSTRUCTIONS
You were admitted with syncope  Found to be in SVT, you were evaluated by cardiology and EP    Please STOP taking Cardizem  start taking TOPROL XL 50 mg po daily  Continue digoxin, sotalol  Continue entresto, aspirin, lipitor, farxiga, eliquis, aldactone  Continue all other home medications as you were taking them before    Please follow up with PCP  Please follow up with cardiology and EP, follow ups given

## 2024-02-15 ENCOUNTER — CARE COORDINATION (OUTPATIENT)
Dept: CASE MANAGEMENT | Age: 74
End: 2024-02-15

## 2024-02-15 NOTE — CARE COORDINATION
Care Transitions Initial Follow Up Call Attempt #1 -Attempted initial 24 hour transitional call to patient/spouse.  Left VM to return call directly to CTN    Call within 2 business days of discharge: Yes    - STV admission after LOC, urgent ICD implant, AF    Patient: Wallace Moore   Patient : 1950   MRN: [id number]    Reason for Admission: LOC, dilated cardiomyopathy - urgent ICD implant, AF  Discharge Date: 24   RARS: Readmission Risk Score: 12.1      Last Discharge Facility       Date Complaint Diagnosis Description Type Department Provider    24 Tachycardia SVT (supraventricular tachycardia) ... ED to Hosp-Admission (Discharged) (ADMITTED) [facility] Roger Evans MD; Gregorio Bowling...            Was this an external facility discharge? No   Non-face-to-face services provided:  Obtained and reviewed discharge summary and/or continuity of care documents        Care Transition Nurse provided contact information.    Rosangela Galo RN

## 2024-02-16 ENCOUNTER — CARE COORDINATION (OUTPATIENT)
Dept: CASE MANAGEMENT | Age: 74
End: 2024-02-16

## 2024-02-16 DIAGNOSIS — F41.1 GAD (GENERALIZED ANXIETY DISORDER): ICD-10-CM

## 2024-02-16 DIAGNOSIS — F33.2 MODERATELY SEVERE RECURRENT MAJOR DEPRESSION (HCC): ICD-10-CM

## 2024-02-16 RX ORDER — DULOXETIN HYDROCHLORIDE 60 MG/1
120 CAPSULE, DELAYED RELEASE ORAL DAILY
Qty: 180 CAPSULE | Refills: 1 | Status: SHIPPED | OUTPATIENT
Start: 2024-02-16

## 2024-02-16 NOTE — TELEPHONE ENCOUNTER
Phone goes straight to RODRIGUEZ ARIAS for pt to call office back to schedule a hospital f/u appt

## 2024-02-16 NOTE — CARE COORDINATION
Care Transitions Initial Follow Up Call Attempt #2 --Attempted initial 24 hour transitional call to both patient/spouse's contact numbers.  Left VM to return call directly to CTN    If no return call today - CT program will be ended after consecutive attempts to reach patient.    Call within 2 business days of discharge: Yes    - STV admission after LOC, urgent ICD implant, AF     Patient: Wallace Moore      Patient : 1950   MRN: [id number]             Reason for Admission: LOC, dilated cardiomyopathy - urgent ICD implant, AF  Discharge Date: 24         RARS: Readmission Risk Score: 12.1         Last Discharge Facility       Date Complaint Diagnosis Description Type Department Provider    24 Tachycardia SVT (supraventricular tachycardia) ... ED to Hosp-Admission (Discharged) (ADMITTED) [facility] Roger Evans MD; Gregorio Bowling...            Was this an external facility discharge? No     Non-face-to-face services provided:  Scheduled appointment with PCP-routed to PCP office pool - they also left VM for patient  Obtained and reviewed discharge summary and/or continuity of care documents      Care Transition Nurse provided contact information.      Rosangela Galo RN

## 2024-02-16 NOTE — TELEPHONE ENCOUNTER
Last visit: 11/08/2023  Last Med refill: 11/16/2023  Does patient have enough medication for 72 hours:     Next Visit Date:  No future appointments.    Health Maintenance   Topic Date Due    Hepatitis C screen  Never done    Shingles vaccine (1 of 2) Never done    Diabetic foot exam  09/15/2022    Lipids  03/23/2023    Annual Wellness Visit (Medicare Advantage)  01/01/2024    Depression Monitoring  01/03/2024    Diabetic retinal exam  03/13/2024    Pneumococcal 65+ years Vaccine (2 - PCV) 05/18/2025 (Originally 12/22/2021)    Colorectal Cancer Screen  03/25/2024    Diabetic Alb to Cr ratio (uACR) test  05/03/2024    A1C test (Diabetic or Prediabetic)  02/13/2025    GFR test (Diabetes, CKD 3-4, OR last GFR 15-59)  02/14/2025    DTaP/Tdap/Td vaccine (2 - Td or Tdap) 09/26/2032    Flu vaccine  Completed    COVID-19 Vaccine  Completed    Respiratory Syncytial Virus (RSV) Pregnant or age 60 yrs+  Completed    AAA screen  Completed    Hepatitis A vaccine  Aged Out    Hepatitis B vaccine  Aged Out    Hib vaccine  Aged Out    Polio vaccine  Aged Out    Meningococcal (ACWY) vaccine  Aged Out    Prostate Specific Antigen (PSA) Screening or Monitoring  Discontinued       Hemoglobin A1C (%)   Date Value   02/13/2024 8.1 (H)   09/07/2023 8.3   12/27/2022 8.1             ( goal A1C is < 7)   No components found for: \"LABMICR\"  LDL Cholesterol (mg/dL)   Date Value   03/23/2022 59   12/16/2020 39       (goal LDL is <100)   AST (U/L)   Date Value   02/12/2024 13     ALT (U/L)   Date Value   02/12/2024 20     BUN (mg/dL)   Date Value   02/14/2024 13     BP Readings from Last 3 Encounters:   02/14/24 (!) 147/80   11/08/23 (!) 88/68   09/12/23 (!) 142/88          (goal 120/80)    All Future Testing planned in [facility]  Lab Frequency Next Occurrence   Hemoglobin A1C Once 05/03/2023   Lipid, Fasting Once 05/03/2023   Comprehensive Metabolic Panel Once 05/03/2023   PSA Screening Once 05/03/2023   Hepatitis C Antibody Once 05/03/2023

## 2024-02-17 LAB
MICROORGANISM SPEC CULT: NORMAL
MICROORGANISM SPEC CULT: NORMAL
SERVICE CMNT-IMP: NORMAL
SERVICE CMNT-IMP: NORMAL
SPECIMEN DESCRIPTION: NORMAL
SPECIMEN DESCRIPTION: NORMAL

## 2024-02-23 LAB
EKG ATRIAL RATE: 56 BPM
EKG P AXIS: 39 DEGREES
EKG P-R INTERVAL: 230 MS
EKG Q-T INTERVAL: 452 MS
EKG QRS DURATION: 98 MS
EKG QTC CALCULATION (BAZETT): 436 MS
EKG R AXIS: -40 DEGREES
EKG T AXIS: -7 DEGREES
EKG VENTRICULAR RATE: 56 BPM

## 2024-03-05 ENCOUNTER — OFFICE VISIT (OUTPATIENT)
Dept: FAMILY MEDICINE CLINIC | Age: 74
End: 2024-03-05
Payer: MEDICARE

## 2024-03-05 VITALS
SYSTOLIC BLOOD PRESSURE: 102 MMHG | DIASTOLIC BLOOD PRESSURE: 58 MMHG | TEMPERATURE: 97.9 F | HEART RATE: 67 BPM | OXYGEN SATURATION: 97 % | WEIGHT: 273.6 LBS | BODY MASS INDEX: 39.26 KG/M2

## 2024-03-05 DIAGNOSIS — I48.0 PAROXYSMAL ATRIAL FIBRILLATION (HCC): ICD-10-CM

## 2024-03-05 DIAGNOSIS — R35.1 BENIGN PROSTATIC HYPERPLASIA WITH NOCTURIA: ICD-10-CM

## 2024-03-05 DIAGNOSIS — N40.1 BENIGN PROSTATIC HYPERPLASIA WITH NOCTURIA: ICD-10-CM

## 2024-03-05 DIAGNOSIS — E66.01 SEVERE OBESITY (BMI 35.0-39.9) WITH COMORBIDITY (HCC): ICD-10-CM

## 2024-03-05 DIAGNOSIS — I47.10 SVT (SUPRAVENTRICULAR TACHYCARDIA): ICD-10-CM

## 2024-03-05 DIAGNOSIS — I50.22 CHRONIC SYSTOLIC (CONGESTIVE) HEART FAILURE (HCC): ICD-10-CM

## 2024-03-05 DIAGNOSIS — E11.59 TYPE 2 DIABETES MELLITUS WITH OTHER CIRCULATORY COMPLICATION, WITHOUT LONG-TERM CURRENT USE OF INSULIN (HCC): ICD-10-CM

## 2024-03-05 DIAGNOSIS — I25.119 ATHEROSCLEROSIS OF NATIVE CORONARY ARTERY OF NATIVE HEART WITH ANGINA PECTORIS (HCC): ICD-10-CM

## 2024-03-05 DIAGNOSIS — M45.0 ANKYLOSING SPONDYLITIS OF MULTIPLE SITES IN SPINE (HCC): ICD-10-CM

## 2024-03-05 DIAGNOSIS — F33.2 MODERATELY SEVERE RECURRENT MAJOR DEPRESSION (HCC): ICD-10-CM

## 2024-03-05 DIAGNOSIS — Z09 HOSPITAL DISCHARGE FOLLOW-UP: Primary | ICD-10-CM

## 2024-03-05 PROBLEM — E66.812 CLASS 2 SEVERE OBESITY DUE TO EXCESS CALORIES WITH SERIOUS COMORBIDITY AND BODY MASS INDEX (BMI) OF 39.0 TO 39.9 IN ADULT: Status: ACTIVE | Noted: 2020-09-22

## 2024-03-05 PROBLEM — J44.9 COPD WITHOUT EXACERBATION (HCC): Status: ACTIVE | Noted: 2024-02-13

## 2024-03-05 PROBLEM — I50.41 ACUTE COMBINED SYSTOLIC AND DIASTOLIC CONGESTIVE HEART FAILURE (HCC): Status: RESOLVED | Noted: 2023-09-07 | Resolved: 2024-03-05

## 2024-03-05 PROCEDURE — 99215 OFFICE O/P EST HI 40 MIN: CPT | Performed by: INTERNAL MEDICINE

## 2024-03-05 PROCEDURE — 3078F DIAST BP <80 MM HG: CPT | Performed by: INTERNAL MEDICINE

## 2024-03-05 PROCEDURE — 1111F DSCHRG MED/CURRENT MED MERGE: CPT | Performed by: INTERNAL MEDICINE

## 2024-03-05 PROCEDURE — 1123F ACP DISCUSS/DSCN MKR DOCD: CPT | Performed by: INTERNAL MEDICINE

## 2024-03-05 PROCEDURE — 3052F HG A1C>EQUAL 8.0%<EQUAL 9.0%: CPT | Performed by: INTERNAL MEDICINE

## 2024-03-05 PROCEDURE — 3074F SYST BP LT 130 MM HG: CPT | Performed by: INTERNAL MEDICINE

## 2024-03-05 RX ORDER — SACUBITRIL AND VALSARTAN 49; 51 MG/1; MG/1
1 TABLET, FILM COATED ORAL 2 TIMES DAILY
COMMUNITY
Start: 2024-02-27

## 2024-03-05 RX ORDER — FINASTERIDE 5 MG/1
5 TABLET, FILM COATED ORAL DAILY
Qty: 30 TABLET | Refills: 3 | Status: SHIPPED | OUTPATIENT
Start: 2024-03-05

## 2024-03-05 ASSESSMENT — PATIENT HEALTH QUESTIONNAIRE - PHQ9
5. POOR APPETITE OR OVEREATING: 0
SUM OF ALL RESPONSES TO PHQ QUESTIONS 1-9: 0
8. MOVING OR SPEAKING SO SLOWLY THAT OTHER PEOPLE COULD HAVE NOTICED. OR THE OPPOSITE, BEING SO FIGETY OR RESTLESS THAT YOU HAVE BEEN MOVING AROUND A LOT MORE THAN USUAL: 0
SUM OF ALL RESPONSES TO PHQ QUESTIONS 1-9: 0
10. IF YOU CHECKED OFF ANY PROBLEMS, HOW DIFFICULT HAVE THESE PROBLEMS MADE IT FOR YOU TO DO YOUR WORK, TAKE CARE OF THINGS AT HOME, OR GET ALONG WITH OTHER PEOPLE: 0
SUM OF ALL RESPONSES TO PHQ QUESTIONS 1-9: 0
1. LITTLE INTEREST OR PLEASURE IN DOING THINGS: 0
7. TROUBLE CONCENTRATING ON THINGS, SUCH AS READING THE NEWSPAPER OR WATCHING TELEVISION: 0
2. FEELING DOWN, DEPRESSED OR HOPELESS: 0
6. FEELING BAD ABOUT YOURSELF - OR THAT YOU ARE A FAILURE OR HAVE LET YOURSELF OR YOUR FAMILY DOWN: 0
SUM OF ALL RESPONSES TO PHQ QUESTIONS 1-9: 0
4. FEELING TIRED OR HAVING LITTLE ENERGY: 0
SUM OF ALL RESPONSES TO PHQ9 QUESTIONS 1 & 2: 0
3. TROUBLE FALLING OR STAYING ASLEEP: 0
9. THOUGHTS THAT YOU WOULD BE BETTER OFF DEAD, OR OF HURTING YOURSELF: 0

## 2024-03-05 NOTE — PATIENT INSTRUCTIONS
Verify with Dr. Lepe whether you need to continue the spironolactone - since you are already on Entresto   Discuss ablation for the SVT to see if that would be an option for you

## 2024-03-05 NOTE — PROGRESS NOTES
symptoms of irregular blood glucose. 100 strip 3    Lancets MISC 1 each by Does not apply route 2 times daily 100 each 5    tamsulosin (FLOMAX) 0.4 MG capsule Take 2 capsules by mouth daily 180 capsule 1    ondansetron (ZOFRAN) 4 MG tablet Take 1 tablet by mouth 3 times daily as needed for Nausea or Vomiting 15 tablet 0    nitroGLYCERIN (NITROSTAT) 0.4 MG SL tablet Place 1 tablet under the tongue every 5 minutes as needed for Chest pain up to max of 3 total doses. If no relief after 1 dose, call 911. 25 tablet 0    loratadine (CLARITIN) 10 MG tablet Take 1 tablet by mouth daily      aspirin 81 MG chewable tablet Take 1 tablet by mouth daily 30 tablet 3    montelukast (SINGULAIR) 10 MG tablet Take 1 tablet by mouth nightly      albuterol (PROVENTIL) (2.5 MG/3ML) 0.083% nebulizer solution Take 3 mLs by nebulization 4 times daily Indications: patient reported taking as needed      Fluticasone furoate-vilanterol (BREO ELLIPTA) 200-25 MCG/INH AEPB inhaler Inhale 200 mcg into the lungs daily          Medications patient taking as of now reconciled against medications ordered at time of hospital discharge: Yes    Review of Systems   Constitutional:  Negative for fatigue, fever and unexpected weight change.   Respiratory:  Negative for cough, choking, chest tightness, shortness of breath and wheezing.    Cardiovascular:  Negative for chest pain, palpitations and leg swelling.   Gastrointestinal:  Negative for abdominal pain, anal bleeding, blood in stool, constipation, diarrhea, nausea and vomiting.   Endocrine: Negative.    Musculoskeletal:  Negative for joint swelling and myalgias.   Skin: Negative.    Neurological:  Negative for dizziness.   Psychiatric/Behavioral:  Negative for sleep disturbance.    All other systems reviewed and are negative.      Objective:    BP (!) 102/58 (Site: Left Upper Arm, Position: Sitting, Cuff Size: Large Adult)   Pulse 67   Temp 97.9 °F (36.6 °C)   Wt 124.1 kg (273 lb 9.6 oz)   SpO2 97%

## 2024-03-11 DIAGNOSIS — N40.1 BENIGN PROSTATIC HYPERPLASIA WITH NOCTURIA: ICD-10-CM

## 2024-03-11 DIAGNOSIS — R35.1 BENIGN PROSTATIC HYPERPLASIA WITH NOCTURIA: ICD-10-CM

## 2024-03-11 RX ORDER — TAMSULOSIN HYDROCHLORIDE 0.4 MG/1
0.8 CAPSULE ORAL DAILY
Qty: 180 CAPSULE | Refills: 1 | Status: SHIPPED | OUTPATIENT
Start: 2024-03-11

## 2024-03-18 ENCOUNTER — TELEPHONE (OUTPATIENT)
Dept: FAMILY MEDICINE CLINIC | Age: 74
End: 2024-03-18

## 2024-03-18 NOTE — TELEPHONE ENCOUNTER
Patient contacted office requesting refill on:  -Eliquis 5mg- twice a day    [facility]      Patient is going out of town tomorrow and needs to  today.

## 2024-03-18 NOTE — TELEPHONE ENCOUNTER
Patient stopped in office checking status of Eliquis refill due to him going out of town. Patient stated pharmacy closes at 6 pm and he will be gone for 1 week.    Sample given of Eliquis 5mg, 14 tabs per box.    Eliquis 5mg  LOT: [id number]  EXP 5/25  14 PILLS PER BOX    PATIENT TAKING TWICE A DAY

## 2024-03-28 ENCOUNTER — TELEPHONE (OUTPATIENT)
Dept: FAMILY MEDICINE CLINIC | Age: 74
End: 2024-03-28

## 2024-03-28 DIAGNOSIS — M54.50 ACUTE LOW BACK PAIN, UNSPECIFIED BACK PAIN LATERALITY, UNSPECIFIED WHETHER SCIATICA PRESENT: Primary | ICD-10-CM

## 2024-03-28 NOTE — TELEPHONE ENCOUNTER
Patient called stating he went to [facility] on 3/22 for knee and back pain.  States they gave him cyclobenzaprine 10mg and tramadol 50mg and would like to know if you can refill these.    Did send a records request. States he had a knee xray done also.

## 2024-03-29 NOTE — TELEPHONE ENCOUNTER
Patient contacted office. Informed him we were awaiting records.     Patient states Tramadol is for every 6 hrs, cyclobenzaprine is 1 every 3 hrs.     [facility]ae

## 2024-03-30 DIAGNOSIS — M54.9 ACUTE BACK PAIN, UNSPECIFIED BACK LOCATION, UNSPECIFIED BACK PAIN LATERALITY: ICD-10-CM

## 2024-04-02 RX ORDER — TRAMADOL HYDROCHLORIDE 50 MG/1
50 TABLET ORAL EVERY 6 HOURS PRN
Qty: 30 TABLET | Refills: 0 | OUTPATIENT
Start: 2024-04-02 | End: 2024-04-09

## 2024-04-02 RX ORDER — CYCLOBENZAPRINE HCL 10 MG
10 TABLET ORAL 3 TIMES DAILY PRN
Qty: 30 TABLET | Refills: 0 | OUTPATIENT
Start: 2024-04-02 | End: 2024-04-12

## 2024-04-02 RX ORDER — TRAMADOL HYDROCHLORIDE 50 MG/1
50 TABLET ORAL EVERY 6 HOURS PRN
Qty: 42 TABLET | Refills: 0 | Status: SHIPPED | OUTPATIENT
Start: 2024-04-02 | End: 2024-04-13

## 2024-04-02 RX ORDER — CYCLOBENZAPRINE HCL 10 MG
10 TABLET ORAL 3 TIMES DAILY PRN
Qty: 30 TABLET | Refills: 0 | Status: SHIPPED | OUTPATIENT
Start: 2024-04-02 | End: 2024-04-12

## 2024-04-08 ENCOUNTER — OFFICE VISIT (OUTPATIENT)
Dept: FAMILY MEDICINE CLINIC | Age: 74
End: 2024-04-08

## 2024-04-08 VITALS
HEART RATE: 62 BPM | SYSTOLIC BLOOD PRESSURE: 108 MMHG | BODY MASS INDEX: 38.4 KG/M2 | TEMPERATURE: 98.2 F | DIASTOLIC BLOOD PRESSURE: 62 MMHG | OXYGEN SATURATION: 95 % | WEIGHT: 267.6 LBS

## 2024-04-08 DIAGNOSIS — Z12.11 SCREENING FOR COLON CANCER: Primary | ICD-10-CM

## 2024-04-08 DIAGNOSIS — M25.561 ACUTE PAIN OF RIGHT KNEE: ICD-10-CM

## 2024-04-08 DIAGNOSIS — M45.0 ANKYLOSING SPONDYLITIS OF MULTIPLE SITES IN SPINE (HCC): ICD-10-CM

## 2024-04-08 DIAGNOSIS — M54.41 ACUTE RIGHT-SIDED LOW BACK PAIN WITH RIGHT-SIDED SCIATICA: ICD-10-CM

## 2024-04-08 SDOH — ECONOMIC STABILITY: HOUSING INSECURITY
IN THE LAST 12 MONTHS, WAS THERE A TIME WHEN YOU DID NOT HAVE A STEADY PLACE TO SLEEP OR SLEPT IN A SHELTER (INCLUDING NOW)?: NO

## 2024-04-08 SDOH — ECONOMIC STABILITY: INCOME INSECURITY: HOW HARD IS IT FOR YOU TO PAY FOR THE VERY BASICS LIKE FOOD, HOUSING, MEDICAL CARE, AND HEATING?: NOT HARD AT ALL

## 2024-04-08 SDOH — ECONOMIC STABILITY: FOOD INSECURITY: WITHIN THE PAST 12 MONTHS, THE FOOD YOU BOUGHT JUST DIDN'T LAST AND YOU DIDN'T HAVE MONEY TO GET MORE.: NEVER TRUE

## 2024-04-08 SDOH — ECONOMIC STABILITY: FOOD INSECURITY: WITHIN THE PAST 12 MONTHS, YOU WORRIED THAT YOUR FOOD WOULD RUN OUT BEFORE YOU GOT MONEY TO BUY MORE.: NEVER TRUE

## 2024-04-08 ASSESSMENT — ENCOUNTER SYMPTOMS: BACK PAIN: 1

## 2024-04-08 NOTE — PROGRESS NOTES
of prescribedmedications.  All patient questions answered.  Pt voiced understanding. Reviewedhealth maintenance.  Instructed to continue current medications, diet and exercise.Patient agreed with treatment plan. Follow up as directed.     Electronically signedby MONSERRAT HERRERA MD on 4/8/2024

## 2024-04-15 ENCOUNTER — PATIENT MESSAGE (OUTPATIENT)
Dept: FAMILY MEDICINE CLINIC | Age: 74
End: 2024-04-15

## 2024-04-15 DIAGNOSIS — M54.41 ACUTE RIGHT-SIDED LOW BACK PAIN WITH RIGHT-SIDED SCIATICA: Primary | ICD-10-CM

## 2024-04-15 RX ORDER — ACETAMINOPHEN AND CODEINE PHOSPHATE 300; 30 MG/1; MG/1
1 TABLET ORAL EVERY 6 HOURS PRN
Qty: 28 TABLET | Refills: 0 | Status: SHIPPED | OUTPATIENT
Start: 2024-04-15 | End: 2024-04-22

## 2024-04-15 NOTE — TELEPHONE ENCOUNTER
From: Wallace Moore  To: Dr. Carole Norman  Sent: 4/15/2024 7:31 AM EDT  Subject: I still am in a lot of pain    No sleep now for days I used all pain meds can you order something stronger Thanks. Marc MOORE

## 2024-04-15 NOTE — TELEPHONE ENCOUNTER
Pt states the Tramadol was not helping very much. He would like something stronger. Pt states he is not sleeping and was up all night. Pt uses [facility] on [address].

## 2024-05-01 ENCOUNTER — OFFICE VISIT (OUTPATIENT)
Dept: FAMILY MEDICINE CLINIC | Age: 74
End: 2024-05-01

## 2024-05-01 VITALS
WEIGHT: 263.8 LBS | TEMPERATURE: 98.2 F | DIASTOLIC BLOOD PRESSURE: 68 MMHG | BODY MASS INDEX: 37.85 KG/M2 | HEART RATE: 72 BPM | OXYGEN SATURATION: 95 % | SYSTOLIC BLOOD PRESSURE: 110 MMHG

## 2024-05-01 DIAGNOSIS — E11.65 UNCONTROLLED TYPE 2 DIABETES MELLITUS WITH HYPERGLYCEMIA (HCC): ICD-10-CM

## 2024-05-01 DIAGNOSIS — I47.10 SVT (SUPRAVENTRICULAR TACHYCARDIA) (HCC): ICD-10-CM

## 2024-05-01 DIAGNOSIS — Z09 HOSPITAL DISCHARGE FOLLOW-UP: Primary | ICD-10-CM

## 2024-05-01 DIAGNOSIS — M54.41 ACUTE RIGHT-SIDED LOW BACK PAIN WITH RIGHT-SIDED SCIATICA: ICD-10-CM

## 2024-05-01 RX ORDER — METOPROLOL SUCCINATE 50 MG/1
50 TABLET, EXTENDED RELEASE ORAL DAILY
COMMUNITY

## 2024-05-01 RX ORDER — AMIODARONE HYDROCHLORIDE 200 MG/1
200 TABLET ORAL DAILY
COMMUNITY
Start: 2024-04-29 | End: 2024-05-01 | Stop reason: SDUPTHER

## 2024-05-01 RX ORDER — OXYCODONE HYDROCHLORIDE 5 MG/1
5 TABLET ORAL EVERY 6 HOURS PRN
Qty: 56 TABLET | Refills: 0 | Status: SHIPPED | OUTPATIENT
Start: 2024-05-01 | End: 2024-05-15

## 2024-05-01 RX ORDER — CYCLOBENZAPRINE HCL 10 MG
10 TABLET ORAL NIGHTLY PRN
Qty: 30 TABLET | Refills: 0 | Status: SHIPPED | OUTPATIENT
Start: 2024-05-01 | End: 2024-05-31

## 2024-05-01 RX ORDER — PEN NEEDLE, DIABETIC 29 G X1/2"
1 NEEDLE, DISPOSABLE MISCELLANEOUS DAILY
Qty: 100 EACH | Refills: 3 | Status: SHIPPED | OUTPATIENT
Start: 2024-05-01 | End: 2024-05-02

## 2024-05-01 RX ORDER — CYCLOBENZAPRINE HCL 10 MG
10 TABLET ORAL 3 TIMES DAILY PRN
COMMUNITY
End: 2024-05-01

## 2024-05-01 RX ORDER — AMIODARONE HYDROCHLORIDE 200 MG/1
TABLET ORAL
Qty: 40 TABLET | Refills: 0 | Status: SHIPPED | OUTPATIENT
Start: 2024-05-01 | End: 2024-05-07

## 2024-05-01 RX ORDER — INSULIN GLARGINE 100 [IU]/ML
10 INJECTION, SOLUTION SUBCUTANEOUS NIGHTLY
Qty: 5 ADJUSTABLE DOSE PRE-FILLED PEN SYRINGE | Refills: 0 | Status: SHIPPED | OUTPATIENT
Start: 2024-05-01

## 2024-05-01 RX ORDER — TRAMADOL HYDROCHLORIDE 50 MG/1
50 TABLET ORAL EVERY 8 HOURS PRN
COMMUNITY

## 2024-05-01 RX ORDER — PREDNISONE 10 MG/1
TABLET ORAL
Qty: 30 TABLET | Refills: 0 | Status: SHIPPED | OUTPATIENT
Start: 2024-05-01

## 2024-05-01 NOTE — PROGRESS NOTES
Post-Discharge Transitional Care Follow Up    Wallace Moore   YOB: 1950    Date of Office Visit:  5/1/2024  Date of Hospital Admission: 4/28/24  Date of Hospital Discharge: 4/29/24    Care management risk score Rising risk (score 2-5) and Complex Care (Scores >=6): No Risk Score On File     Non face to face  following discharge, date last encounter closed (first attempt may have been earlier): *No documented post hospital discharge outreach found in the last 14 days     Call initiated 2 business days of discharge: *No response recorded in the last 14 days    ASSESSMENT/PLAN:   Hospital discharge follow-up  -     OR DISCHARGE MEDS RECONCILED W/ CURRENT OUTPATIENT MED LIST  Acute right-sided low back pain with right-sided sciatica  -     predniSONE (DELTASONE) 10 MG tablet; 4 tabs by mouth daily for 3 days, then 3 tabs daily for 3 days, then 2 tabs daily for 3 days, then 1 tab daily till gone., Disp-30 tablet, R-0Normal  -     cyclobenzaprine (FLEXERIL) 10 MG tablet; Take 1 tablet by mouth nightly as needed for Muscle spasms, Disp-30 tablet, R-0Normal  -     oxyCODONE (ROXICODONE) 5 MG immediate release tablet; Take 1 tablet by mouth every 6 hours as needed for Pain for up to 14 days. Intended supply: 3 days. Take lowest dose possible to manage pain Max Daily Amount: 20 mg, Disp-56 tablet, R-0Normal  SVT (supraventricular tachycardia) (HCC)  Uncontrolled type 2 diabetes mellitus with hyperglycemia (HCC)  -     insulin glargine (LANTUS SOLOSTAR) 100 UNIT/ML injection pen; Inject 10 Units into the skin nightly, Disp-5 Adjustable Dose Pre-filled Pen Syringe, R-0Normal      Medical Decision Making: high complexity  No follow-ups on file.    On this date 5/1/2024 I have spent 45 minutes reviewing previous notes, test results and face to face with the patient discussing the diagnosis and importance of compliance with the treatment plan as well as documenting on the day of the visit.       Subjective: 
headache

## 2024-05-02 ENCOUNTER — TELEPHONE (OUTPATIENT)
Dept: FAMILY MEDICINE CLINIC | Age: 74
End: 2024-05-02

## 2024-05-02 DIAGNOSIS — E11.65 UNCONTROLLED TYPE 2 DIABETES MELLITUS WITH HYPERGLYCEMIA (HCC): Primary | ICD-10-CM

## 2024-05-02 NOTE — TELEPHONE ENCOUNTER
Patient called regarding pen needles. States pharmacy does not have this size, 29G x 12mm.  States they do have 2mm-10mm.

## 2024-05-06 ENCOUNTER — TELEPHONE (OUTPATIENT)
Dept: FAMILY MEDICINE CLINIC | Age: 74
End: 2024-05-06

## 2024-05-07 DIAGNOSIS — I47.10 SVT (SUPRAVENTRICULAR TACHYCARDIA) (HCC): ICD-10-CM

## 2024-05-07 RX ORDER — AMIODARONE HYDROCHLORIDE 200 MG/1
TABLET ORAL
Qty: 40 TABLET | Refills: 0 | Status: SHIPPED | OUTPATIENT
Start: 2024-05-07 | End: 2024-06-11

## 2024-05-07 ASSESSMENT — ENCOUNTER SYMPTOMS
DIARRHEA: 0
VOMITING: 0
ABDOMINAL PAIN: 0
CHOKING: 0
COUGH: 0
CONSTIPATION: 0
WHEEZING: 0
CHEST TIGHTNESS: 0
ANAL BLEEDING: 0
BLOOD IN STOOL: 0
BACK PAIN: 1
SHORTNESS OF BREATH: 0
NAUSEA: 0

## 2024-05-07 ASSESSMENT — VISUAL ACUITY: OU: 1

## 2024-05-07 NOTE — TELEPHONE ENCOUNTER
No sotalol - that was discontinued, amiodarone is instead of sotalol, was d/c from med list at visit

## 2024-05-07 NOTE — TELEPHONE ENCOUNTER
Pharmacist calls in to see if physician still wants patient on these Meds.  Patient on sotalolo and there is a flag with Drug interaction with digoxin and  Pharmacist want to clarify.  Amiodrone and sotalolo can cause acute T prologation  Digoxin and amiodrone also flags  drug interactions    Please advise

## 2024-05-09 NOTE — TELEPHONE ENCOUNTER
I spoke with Lawrence from [facility] the Loretta has been DC'd. He was notified patient is to be one Amiodarone

## 2024-05-17 ENCOUNTER — ENROLLMENT (OUTPATIENT)
Dept: PHARMACY | Facility: CLINIC | Age: 74
End: 2024-05-17

## 2024-05-20 ENCOUNTER — OFFICE VISIT (OUTPATIENT)
Dept: FAMILY MEDICINE CLINIC | Age: 74
End: 2024-05-20
Payer: MEDICARE

## 2024-05-20 VITALS
SYSTOLIC BLOOD PRESSURE: 110 MMHG | OXYGEN SATURATION: 93 % | DIASTOLIC BLOOD PRESSURE: 76 MMHG | HEART RATE: 60 BPM | WEIGHT: 270 LBS | BODY MASS INDEX: 38.74 KG/M2

## 2024-05-20 DIAGNOSIS — E78.5 DYSLIPIDEMIA DUE TO TYPE 2 DIABETES MELLITUS (HCC): ICD-10-CM

## 2024-05-20 DIAGNOSIS — M45.0 ANKYLOSING SPONDYLITIS OF MULTIPLE SITES IN SPINE (HCC): ICD-10-CM

## 2024-05-20 DIAGNOSIS — Z79.01 LONG TERM CURRENT USE OF ANTICOAGULANT: ICD-10-CM

## 2024-05-20 DIAGNOSIS — M17.11 PRIMARY OSTEOARTHRITIS OF RIGHT KNEE: ICD-10-CM

## 2024-05-20 DIAGNOSIS — E11.65 UNCONTROLLED TYPE 2 DIABETES MELLITUS WITH HYPERGLYCEMIA (HCC): Primary | ICD-10-CM

## 2024-05-20 DIAGNOSIS — M54.41 ACUTE RIGHT-SIDED LOW BACK PAIN WITH RIGHT-SIDED SCIATICA: ICD-10-CM

## 2024-05-20 DIAGNOSIS — I48.0 PAROXYSMAL ATRIAL FIBRILLATION (HCC): ICD-10-CM

## 2024-05-20 DIAGNOSIS — E11.69 DYSLIPIDEMIA DUE TO TYPE 2 DIABETES MELLITUS (HCC): ICD-10-CM

## 2024-05-20 LAB — HBA1C MFR BLD: 10.6 %

## 2024-05-20 PROCEDURE — 83036 HEMOGLOBIN GLYCOSYLATED A1C: CPT | Performed by: INTERNAL MEDICINE

## 2024-05-20 PROCEDURE — 3074F SYST BP LT 130 MM HG: CPT | Performed by: INTERNAL MEDICINE

## 2024-05-20 PROCEDURE — 99214 OFFICE O/P EST MOD 30 MIN: CPT | Performed by: INTERNAL MEDICINE

## 2024-05-20 PROCEDURE — 1123F ACP DISCUSS/DSCN MKR DOCD: CPT | Performed by: INTERNAL MEDICINE

## 2024-05-20 PROCEDURE — 3078F DIAST BP <80 MM HG: CPT | Performed by: INTERNAL MEDICINE

## 2024-05-20 PROCEDURE — 3052F HG A1C>EQUAL 8.0%<EQUAL 9.0%: CPT | Performed by: INTERNAL MEDICINE

## 2024-05-20 RX ORDER — OXYCODONE HYDROCHLORIDE 5 MG/1
5 TABLET ORAL EVERY 12 HOURS PRN
Qty: 60 TABLET | Refills: 0 | Status: SHIPPED | OUTPATIENT
Start: 2024-05-20 | End: 2024-06-19

## 2024-05-20 RX ORDER — INSULIN GLARGINE 100 [IU]/ML
12 INJECTION, SOLUTION SUBCUTANEOUS NIGHTLY
Qty: 5 ADJUSTABLE DOSE PRE-FILLED PEN SYRINGE | Refills: 0 | Status: SHIPPED | OUTPATIENT
Start: 2024-05-20

## 2024-05-20 ASSESSMENT — ENCOUNTER SYMPTOMS
BLOOD IN STOOL: 0
CHEST TIGHTNESS: 0
SHORTNESS OF BREATH: 0
COUGH: 0
BACK PAIN: 1
DIARRHEA: 0
WHEEZING: 0
CONSTIPATION: 0
ABDOMINAL PAIN: 0
CHOKING: 0
VOMITING: 0
ANAL BLEEDING: 0
NAUSEA: 0

## 2024-05-20 ASSESSMENT — VISUAL ACUITY: OU: 1

## 2024-05-20 NOTE — PROGRESS NOTES
09/15/2022    Lipids  03/23/2023    Annual Wellness Visit (Medicare Advantage)  01/01/2024    Diabetic retinal exam  03/13/2024    Colorectal Cancer Screen  03/25/2024    Diabetic Alb to Cr ratio (uACR) test  05/03/2024           Patient given educational materials- see patient instructions.  Discussed use, benefit, and side effects of prescribedmedications.  All patient questions answered.  Pt voiced understanding. Reviewedhealth maintenance.  Instructed to continue current medications, diet and exercise.Patient agreed with treatment plan. Follow up as directed.     Electronically signedby MONSERRAT HERRERA MD on 5/20/2024

## 2024-06-03 DIAGNOSIS — I47.10 SVT (SUPRAVENTRICULAR TACHYCARDIA) (HCC): ICD-10-CM

## 2024-06-03 RX ORDER — AMIODARONE HYDROCHLORIDE 200 MG/1
TABLET ORAL
Qty: 90 TABLET | Refills: 1 | Status: SHIPPED | OUTPATIENT
Start: 2024-06-03

## 2024-06-03 NOTE — TELEPHONE ENCOUNTER
Microalbumin, Ur Once 06/20/2024   Lipid Panel Once 06/19/2024               Patient Active Problem List:     Ankylosing spondylitis of multiple sites in spine (HCC)     Chronic silicosis (HCC)     Moderate persistent asthma without complication     Coronary artery disease involving native coronary artery of native heart without angina pectoris     History of ST elevation myocardial infarction (STEMI)     Class 2 severe obesity due to excess calories with serious comorbidity and body mass index (BMI) of 39.0 to 39.9 in adult (HCC)     Moderately severe recurrent major depression ([facility])     Eczema of face     Benign prostatic hyperplasia with nocturia     Type 2 diabetes mellitus with other circulatory complication, without long-term current use of insulin ([facility])     Angina pectoris, unspecified     Atherosclerotic heart disease of native coronary artery with unspecified angina pectoris     Atrial fibrillation (HCC)     New onset atrial fibrillation ([facility])     Abnormal echocardiogram     SVT (supraventricular tachycardia) ([facility])     COPD without exacerbation (HCC)     Primary hypertension     DEB treated with BiPAP     Heat syncope     Cardiomyopathy (HCC)     Chronic systolic (congestive) heart failure     Acute right-sided low back pain with right-sided sciatica     Primary osteoarthritis of right knee

## 2024-06-13 ENCOUNTER — OFFICE VISIT (OUTPATIENT)
Age: 74
End: 2024-06-13

## 2024-06-13 VITALS — HEIGHT: 71 IN | WEIGHT: 270 LBS | BODY MASS INDEX: 37.8 KG/M2

## 2024-06-13 DIAGNOSIS — E11.9 TYPE 2 DIABETES MELLITUS WITHOUT COMPLICATION, WITH LONG-TERM CURRENT USE OF INSULIN (HCC): ICD-10-CM

## 2024-06-13 DIAGNOSIS — B35.1 ONYCHOMYCOSIS OF TOENAIL: Primary | ICD-10-CM

## 2024-06-13 DIAGNOSIS — M79.675 PAIN OF TOES OF BOTH FEET: ICD-10-CM

## 2024-06-13 DIAGNOSIS — Z79.4 TYPE 2 DIABETES MELLITUS WITHOUT COMPLICATION, WITH LONG-TERM CURRENT USE OF INSULIN (HCC): ICD-10-CM

## 2024-06-13 DIAGNOSIS — M79.674 PAIN OF TOES OF BOTH FEET: ICD-10-CM

## 2024-06-13 ASSESSMENT — ENCOUNTER SYMPTOMS
COLOR CHANGE: 0
DIARRHEA: 0
NAUSEA: 0
BACK PAIN: 0
SHORTNESS OF BREATH: 0

## 2024-06-13 NOTE — PROGRESS NOTES
+5/5 to all four muscle groups of the right lower extremity and +5/5 to all four muscle groups of the left lower extremity.     There are no areas of subluxation, dislocation, or laxity noted to either lower extremity.     Range of motion to the right ankle is  free of pain or grinding.     Range of motion to the left ankle is  free of pain or grinding.     Range of motion to the right subtalar joint is  free of pain or grinding.     Range of motion to the left subtalar joint is  free of pain or grinding.     No abnormalities, asymmetries, or misalignments are seen between the extremities.    Weightbearing evaluation does not reveal rearfoot eversion, medial prominence of the talar head, loss of the medial longitudinal arch height, and too many toes sign bilaterally.    The lesser digits of the right foot are contracted.     The lesser digits of the left foot are contracted.     There is no prominence noted to the first metatarsal head without abduction of the hallux of the right foot.     There is no prominence noted to the first metatarsal head without abduction of the hallux of the left foot.    Shoe examination was performed.    Biomechanical Exam: normal bilaterally.    Asessment: Patient is a 74 y.o. male with:    Diagnosis Orders   1. Onychomycosis of toenail  MA DEBRIDEMENT NAIL ANY METHOD 6/>    HM DIABETES FOOT EXAM      2. Pain of toes of both feet  MA DEBRIDEMENT NAIL ANY METHOD 6/>    HM DIABETES FOOT EXAM      3. Type 2 diabetes mellitus without complication, with long-term current use of insulin ([facility])  MA DEBRIDEMENT NAIL ANY METHOD 6/>    HM DIABETES FOOT EXAM          Plan:  1. Clinical evaluation of the patient. 2.  Toenails 1-5 of the right foot and 1-5 of the left foot were debrided in length and thickness using a nail nipper and a .  Patient educated on proper diabetic footcare.  3. Contact office with any questions/problems/concerns.  Return in about 3 months (around 9/13/2024) for

## 2024-06-14 ENCOUNTER — TELEPHONE (OUTPATIENT)
Dept: FAMILY MEDICINE CLINIC | Age: 74
End: 2024-06-14

## 2024-06-14 DIAGNOSIS — M45.0 ANKYLOSING SPONDYLITIS OF MULTIPLE SITES IN SPINE (HCC): ICD-10-CM

## 2024-06-14 DIAGNOSIS — Z79.01 LONG TERM CURRENT USE OF ANTICOAGULANT: ICD-10-CM

## 2024-06-14 DIAGNOSIS — M54.41 ACUTE RIGHT-SIDED LOW BACK PAIN WITH RIGHT-SIDED SCIATICA: ICD-10-CM

## 2024-06-14 DIAGNOSIS — M17.11 PRIMARY OSTEOARTHRITIS OF RIGHT KNEE: ICD-10-CM

## 2024-06-14 NOTE — TELEPHONE ENCOUNTER
Pt called states he fell yesterday, pt states he stepped on a dog brush, hurt his feet and fell to his knees, really hurt, no bruising, no swelling, pt stated he is going to take an extra Oxycodone, but if he does he will run out of med, can we send in script with new directions so pt does not run out before the month is over     Stated he is only going to do the 3 a day until the pain gets better

## 2024-06-17 NOTE — TELEPHONE ENCOUNTER
If he is taking one extra pill shouldn't be as much of an issue - what are we talking about here? Can send in voltaren gel to help with the inflammation that is causing his pain

## 2024-06-17 NOTE — TELEPHONE ENCOUNTER
Patient called checking status of message. Advised patient provider has been with patient's all morning, but I'll put another message in

## 2024-06-19 RX ORDER — OXYCODONE HYDROCHLORIDE 5 MG/1
5 TABLET ORAL EVERY 12 HOURS PRN
Qty: 60 TABLET | Refills: 0 | Status: SHIPPED | OUTPATIENT
Start: 2024-06-19 | End: 2024-07-19

## 2024-06-19 NOTE — TELEPHONE ENCOUNTER
Patient states he has been taking 3 a day instead of 2. States he does not want to run out of the med over the weekend.  States the pain has been getting better, but wakes up in the night in pain.  States he is agreeable to trying Voltaren.

## 2024-06-28 DIAGNOSIS — E11.59 TYPE 2 DIABETES MELLITUS WITH OTHER CIRCULATORY COMPLICATION, WITHOUT LONG-TERM CURRENT USE OF INSULIN (HCC): ICD-10-CM

## 2024-06-28 RX ORDER — METFORMIN HYDROCHLORIDE 500 MG/1
TABLET, EXTENDED RELEASE ORAL
Qty: 180 TABLET | Refills: 1 | Status: SHIPPED | OUTPATIENT
Start: 2024-06-28

## 2024-06-28 NOTE — TELEPHONE ENCOUNTER
Last visit: 5/20/24  Last Med refill: 1/2/24  Does patient have enough medication for 72 hours: Yes    Next Visit Date:  Future Appointments   Date Time Provider Department Center   7/24/2024 12:00 PM [facility]   9/19/2024  1:00 PM Rashid Chadwick, [facility]       Health Maintenance   Topic Date Due    Shingles vaccine (1 of 2) Never done    Lipids  03/23/2023    Annual Wellness Visit (Medicare Advantage)  01/01/2024    Diabetic retinal exam  03/13/2024    Colorectal Cancer Screen  03/25/2024    Diabetic Alb to Cr ratio (uACR) test  05/03/2024    Pneumococcal 65+ years Vaccine (2 of 2 - PCV) 05/18/2025 (Originally 12/22/2021)    A1C test (Diabetic or Prediabetic)  08/20/2024    GFR test (Diabetes, CKD 3-4, OR last GFR 15-59)  02/14/2025    Depression Monitoring  03/05/2025    Diabetic foot exam  06/14/2025    DTaP/Tdap/Td vaccine (2 - Td or Tdap) 09/26/2032    Flu vaccine  Completed    COVID-19 Vaccine  Completed    Respiratory Syncytial Virus (RSV) Pregnant or age 60 yrs+  Completed    AAA screen  Completed    Hepatitis C screen  Completed    Hepatitis A vaccine  Aged Out    Hepatitis B vaccine  Aged Out    Hib vaccine  Aged Out    Polio vaccine  Aged Out    Meningococcal (ACWY) vaccine  Aged Out    Prostate Specific Antigen (PSA) Screening or Monitoring  Discontinued       Hemoglobin A1C (%)   Date Value   05/20/2024 10.6   02/13/2024 8.1 (H)   09/07/2023 8.3             ( goal A1C is < 7)   No components found for: \"LABMICR\"  No components found for: \"LDLCHOLESTEROL\", \"LDLCALC\"    (goal LDL is <100)   AST (U/L)   Date Value   02/12/2024 13     ALT (U/L)   Date Value   02/12/2024 20     BUN (mg/dL)   Date Value   02/14/2024 13     BP Readings from Last 3 Encounters:   05/20/24 110/76   05/01/24 110/68   04/08/24 108/62          (goal 120/80)    All Future Testing planned in [facility]  Lab Frequency Next Occurrence   MRI LUMBAR SPINE WO CONTRAST Once 04/08/2024

## 2024-07-08 DIAGNOSIS — R35.1 BENIGN PROSTATIC HYPERPLASIA WITH NOCTURIA: ICD-10-CM

## 2024-07-08 DIAGNOSIS — N40.1 BENIGN PROSTATIC HYPERPLASIA WITH NOCTURIA: ICD-10-CM

## 2024-07-08 RX ORDER — FINASTERIDE 5 MG/1
5 TABLET, FILM COATED ORAL DAILY
Qty: 30 TABLET | Refills: 3 | Status: SHIPPED | OUTPATIENT
Start: 2024-07-08

## 2024-07-08 NOTE — TELEPHONE ENCOUNTER
Last visit: 5/20/2024  Last Med refill: 05/31/2024  Does patient have enough medication for 72 hours: No:     Next Visit Date:  Future Appointments   Date Time Provider Department Center   7/24/2024 12:00 PM Carole [facility] MD [facility]   9/19/2024  1:00 PM Rashid Chadwick DPM [facility]       Health Maintenance   Topic Date Due    Shingles vaccine (1 of 2) Never done    Lipids  03/23/2023    Annual Wellness Visit (Medicare Advantage)  01/01/2024    Diabetic retinal exam  03/13/2024    Colorectal Cancer Screen  03/25/2024    Diabetic Alb to Cr ratio (uACR) test  05/03/2024    Pneumococcal 65+ years Vaccine (2 of 2 - PCV) 05/18/2025 (Originally 12/22/2021)    Flu vaccine (1) 08/01/2024    A1C test (Diabetic or Prediabetic)  08/20/2024    GFR test (Diabetes, CKD 3-4, OR last GFR 15-59)  02/14/2025    Depression Monitoring  03/05/2025    Diabetic foot exam  06/14/2025    DTaP/Tdap/Td vaccine (2 - Td or Tdap) 09/26/2032    COVID-19 Vaccine  Completed    Respiratory Syncytial Virus (RSV) Pregnant or age 60 yrs+  Completed    AAA screen  Completed    Hepatitis C screen  Completed    Hepatitis A vaccine  Aged Out    Hepatitis B vaccine  Aged Out    Hib vaccine  Aged Out    Polio vaccine  Aged Out    Meningococcal (ACWY) vaccine  Aged Out    Prostate Specific Antigen (PSA) Screening or Monitoring  Discontinued       Hemoglobin A1C (%)   Date Value   05/20/2024 10.6   02/13/2024 8.1 (H)   09/07/2023 8.3             ( goal A1C is < 7)   No components found for: \"LABMICR\"  No components found for: \"LDLCHOLESTEROL\", \"LDLCALC\"    (goal LDL is <100)   AST (U/L)   Date Value   02/12/2024 13     ALT (U/L)   Date Value   02/12/2024 20     BUN (mg/dL)   Date Value   02/14/2024 13     BP Readings from Last 3 Encounters:   05/20/24 110/76   05/01/24 110/68   04/08/24 108/62          (goal 120/80)    All Future Testing planned in [facility]  Lab Frequency Next Occurrence   MRI LUMBAR SPINE WO CONTRAST Once

## 2024-07-11 ENCOUNTER — HOSPITAL ENCOUNTER (OUTPATIENT)
Age: 74
Setting detail: SPECIMEN
Discharge: HOME | End: 2024-07-11

## 2024-07-11 LAB
ANION GAP SERPL CALCULATED.3IONS-SCNC: 13 MMOL/L (ref 9–16)
BUN SERPL-MCNC: 19 MG/DL (ref 8–23)
CALCIUM SERPL-MCNC: 9.8 MG/DL (ref 8.6–10.4)
CHLORIDE SERPL-SCNC: 99 MMOL/L (ref 98–107)
CO2 SERPL-SCNC: 24 MMOL/L (ref 20–31)
CREAT SERPL-MCNC: 0.8 MG/DL (ref 0.7–1.2)
ERYTHROCYTE [DISTWIDTH] IN BLOOD BY AUTOMATED COUNT: 15.1 % (ref 11.8–14.4)
GFR, ESTIMATED: >90 ML/MIN/1.73M2
GLUCOSE SERPL-MCNC: 130 MG/DL (ref 74–99)
HCT VFR BLD AUTO: 42 % (ref 40.7–50.3)
HGB BLD-MCNC: 13.4 G/DL (ref 13–17)
MCH RBC QN AUTO: 27.9 PG (ref 25.2–33.5)
MCHC RBC AUTO-ENTMCNC: 31.9 G/DL (ref 28.4–34.8)
MCV RBC AUTO: 87.5 FL (ref 82.6–102.9)
NRBC BLD-RTO: 0 PER 100 WBC
PLATELET # BLD AUTO: 257 K/UL (ref 138–453)
PMV BLD AUTO: 10.2 FL (ref 8.1–13.5)
POTASSIUM SERPL-SCNC: 4.4 MMOL/L (ref 3.7–5.3)
RBC # BLD AUTO: 4.8 M/UL (ref 4.21–5.77)
SODIUM SERPL-SCNC: 136 MMOL/L (ref 136–145)
WBC OTHER # BLD: 9.6 K/UL (ref 3.5–11.3)

## 2024-07-17 DIAGNOSIS — M54.41 ACUTE RIGHT-SIDED LOW BACK PAIN WITH RIGHT-SIDED SCIATICA: ICD-10-CM

## 2024-07-17 DIAGNOSIS — M17.11 PRIMARY OSTEOARTHRITIS OF RIGHT KNEE: ICD-10-CM

## 2024-07-17 DIAGNOSIS — Z79.01 LONG TERM CURRENT USE OF ANTICOAGULANT: ICD-10-CM

## 2024-07-17 DIAGNOSIS — M45.0 ANKYLOSING SPONDYLITIS OF MULTIPLE SITES IN SPINE (HCC): ICD-10-CM

## 2024-07-17 RX ORDER — OXYCODONE HYDROCHLORIDE 5 MG/1
5 TABLET ORAL EVERY 12 HOURS PRN
Qty: 60 TABLET | Refills: 0 | Status: SHIPPED | OUTPATIENT
Start: 2024-07-17 | End: 2024-08-16

## 2024-07-17 NOTE — TELEPHONE ENCOUNTER
Once 04/08/2024   Microalbumin, Ur Once 06/20/2024   Lipid Panel Once 06/19/2024               Patient Active Problem List:     Ankylosing spondylitis of multiple sites in spine (HCC)     Chronic silicosis (HCC)     Moderate persistent asthma without complication     Coronary artery disease involving native coronary artery of native heart without angina pectoris     History of ST elevation myocardial infarction (STEMI)     Class 2 severe obesity due to excess calories with serious comorbidity and body mass index (BMI) of 39.0 to 39.9 in adult ([facility])     Moderately severe recurrent major depression ([facility])     Eczema of face     Benign prostatic hyperplasia with nocturia     Type 2 diabetes mellitus with other circulatory complication, without long-term current use of insulin ([facility])     Angina pectoris, unspecified     Atherosclerotic heart disease of native coronary artery with unspecified angina pectoris     Atrial fibrillation ([facility])     New onset atrial fibrillation ([facility])     Abnormal echocardiogram     SVT (supraventricular tachycardia) ([facility])     COPD without exacerbation ([facility])     Primary hypertension     DEB treated with BiPAP     Heat syncope     Cardiomyopathy ([facility])     Chronic systolic (congestive) heart failure     Acute right-sided low back pain with right-sided sciatica     Primary osteoarthritis of right knee

## 2024-07-26 DIAGNOSIS — F41.1 GAD (GENERALIZED ANXIETY DISORDER): ICD-10-CM

## 2024-07-26 RX ORDER — BUSPIRONE HYDROCHLORIDE 10 MG/1
TABLET ORAL
Qty: 180 TABLET | Refills: 0 | Status: SHIPPED | OUTPATIENT
Start: 2024-07-26

## 2024-07-26 NOTE — TELEPHONE ENCOUNTER
Last visit: 05/20/2024  Last Med refill: 04/25/2024  Does patient have enough medication for 72 hours: No:     Next Visit Date:  Future Appointments   Date Time Provider Department Center   9/10/2024  3:00 PM Carole [facility] MD [facility]   9/19/2024  1:00 PM Rashid Chadwick DPM [facility]       Health Maintenance   Topic Date Due    Shingles vaccine (1 of 2) Never done    Lipids  03/23/2023    Annual Wellness Visit (Medicare Advantage)  01/01/2024    Diabetic retinal exam  03/13/2024    Colorectal Cancer Screen  03/25/2024    Diabetic Alb to Cr ratio (uACR) test  05/03/2024    A1C test (Diabetic or Prediabetic)  08/20/2024    Pneumococcal 65+ years Vaccine (2 of 2 - PCV) 05/18/2025 (Originally 12/22/2021)    Flu vaccine (1) 08/01/2024    Depression Monitoring  03/05/2025    Diabetic foot exam  06/14/2025    GFR test (Diabetes, CKD 3-4, OR last GFR 15-59)  07/11/2025    DTaP/Tdap/Td vaccine (2 - Td or Tdap) 09/26/2032    COVID-19 Vaccine  Completed    Respiratory Syncytial Virus (RSV) Pregnant or age 60 yrs+  Completed    AAA screen  Completed    Hepatitis C screen  Completed    Hepatitis A vaccine  Aged Out    Hepatitis B vaccine  Aged Out    Hib vaccine  Aged Out    Polio vaccine  Aged Out    Meningococcal (ACWY) vaccine  Aged Out    Prostate Specific Antigen (PSA) Screening or Monitoring  Discontinued       Hemoglobin A1C (%)   Date Value   05/20/2024 10.6   02/13/2024 8.1 (H)   09/07/2023 8.3             ( goal A1C is < 7)   No components found for: \"LABMICR\"  No components found for: \"LDLCHOLESTEROL\", \"LDLCALC\"    (goal LDL is <100)   AST (U/L)   Date Value   02/12/2024 13     ALT (U/L)   Date Value   02/12/2024 20     BUN (mg/dL)   Date Value   07/11/2024 19     BP Readings from Last 3 Encounters:   05/20/24 110/76   05/01/24 110/68   04/08/24 108/62          (goal 120/80)    All Future Testing planned in [facility]  Lab Frequency Next Occurrence   MRI LUMBAR SPINE WO CONTRAST Once

## 2024-07-31 ENCOUNTER — ANESTHESIA (OUTPATIENT)
Age: 74
End: 2024-07-31
Payer: MEDICARE

## 2024-07-31 ENCOUNTER — HOSPITAL ENCOUNTER (OUTPATIENT)
Age: 74
Setting detail: OUTPATIENT SURGERY
Discharge: HOME | End: 2024-07-31
Attending: INTERNAL MEDICINE | Admitting: INTERNAL MEDICINE
Payer: MEDICARE

## 2024-07-31 ENCOUNTER — ANESTHESIA EVENT (OUTPATIENT)
Age: 74
End: 2024-07-31
Payer: MEDICARE

## 2024-07-31 VITALS
HEIGHT: 71 IN | OXYGEN SATURATION: 97 % | TEMPERATURE: 97.9 F | RESPIRATION RATE: 21 BRPM | WEIGHT: 273 LBS | DIASTOLIC BLOOD PRESSURE: 93 MMHG | BODY MASS INDEX: 38.22 KG/M2 | SYSTOLIC BLOOD PRESSURE: 143 MMHG | HEART RATE: 76 BPM

## 2024-07-31 DIAGNOSIS — I48.3 TYPICAL ATRIAL FLUTTER (HCC): ICD-10-CM

## 2024-07-31 DIAGNOSIS — I48.91 ATRIAL FIBRILLATION, UNSPECIFIED TYPE (HCC): ICD-10-CM

## 2024-07-31 DIAGNOSIS — I47.19 AV NODAL RE-ENTRY TACHYCARDIA (HCC): Primary | ICD-10-CM

## 2024-07-31 DIAGNOSIS — I48.19 PERSISTENT ATRIAL FIBRILLATION (HCC): ICD-10-CM

## 2024-07-31 LAB
ACT BLD: 324 SEC (ref 79–149)
ACT BLD: 337 SEC (ref 79–149)
ACT BLD: 350 SEC (ref 79–149)
ACT BLD: 358 SEC (ref 79–149)
ACT BLD: 379 SEC (ref 79–149)
ACT BLD: 383 SEC (ref 79–149)
ACT BLD: 392 SEC (ref 79–149)
GLUCOSE BLD-MCNC: 179 MG/DL (ref 75–110)

## 2024-07-31 PROCEDURE — 7100000011 HC PHASE II RECOVERY - ADDTL 15 MIN: Performed by: INTERNAL MEDICINE

## 2024-07-31 PROCEDURE — 3700000001 HC ADD 15 MINUTES (ANESTHESIA): Performed by: INTERNAL MEDICINE

## 2024-07-31 PROCEDURE — C1730 CATH, EP, 19 OR FEW ELECT: HCPCS | Performed by: INTERNAL MEDICINE

## 2024-07-31 PROCEDURE — 93653 COMPRE EP EVAL TX SVT: CPT | Performed by: INTERNAL MEDICINE

## 2024-07-31 PROCEDURE — C1759 CATH, INTRA ECHOCARDIOGRAPHY: HCPCS | Performed by: INTERNAL MEDICINE

## 2024-07-31 PROCEDURE — 6360000002 HC RX W HCPCS: Performed by: NURSE ANESTHETIST, CERTIFIED REGISTERED

## 2024-07-31 PROCEDURE — 93662 INTRACARDIAC ECG (ICE): CPT | Performed by: INTERNAL MEDICINE

## 2024-07-31 PROCEDURE — 85347 COAGULATION TIME ACTIVATED: CPT

## 2024-07-31 PROCEDURE — 2720000010 HC SURG SUPPLY STERILE: Performed by: INTERNAL MEDICINE

## 2024-07-31 PROCEDURE — 3700000000 HC ANESTHESIA ATTENDED CARE: Performed by: INTERNAL MEDICINE

## 2024-07-31 PROCEDURE — 7100000000 HC PACU RECOVERY - FIRST 15 MIN: Performed by: INTERNAL MEDICINE

## 2024-07-31 PROCEDURE — C1893 INTRO/SHEATH, FIXED,NON-PEEL: HCPCS | Performed by: INTERNAL MEDICINE

## 2024-07-31 PROCEDURE — 2709999900 HC NON-CHARGEABLE SUPPLY: Performed by: INTERNAL MEDICINE

## 2024-07-31 PROCEDURE — 93613 INTRACARDIAC EPHYS 3D MAPG: CPT | Performed by: INTERNAL MEDICINE

## 2024-07-31 PROCEDURE — 93620 COMP EP EVL R AT VEN PAC&REC: CPT | Performed by: INTERNAL MEDICINE

## 2024-07-31 PROCEDURE — 93462 L HRT CATH TRNSPTL PUNCTURE: CPT | Performed by: INTERNAL MEDICINE

## 2024-07-31 PROCEDURE — 82947 ASSAY GLUCOSE BLOOD QUANT: CPT

## 2024-07-31 PROCEDURE — 7100000010 HC PHASE II RECOVERY - FIRST 15 MIN: Performed by: INTERNAL MEDICINE

## 2024-07-31 PROCEDURE — 2580000003 HC RX 258: Performed by: NURSE ANESTHETIST, CERTIFIED REGISTERED

## 2024-07-31 PROCEDURE — 7100000001 HC PACU RECOVERY - ADDTL 15 MIN: Performed by: INTERNAL MEDICINE

## 2024-07-31 PROCEDURE — C1894 INTRO/SHEATH, NON-LASER: HCPCS | Performed by: INTERNAL MEDICINE

## 2024-07-31 PROCEDURE — 2580000003 HC RX 258: Performed by: INTERNAL MEDICINE

## 2024-07-31 PROCEDURE — C1731 CATH, EP, 20 OR MORE ELEC: HCPCS | Performed by: INTERNAL MEDICINE

## 2024-07-31 PROCEDURE — 2500000003 HC RX 250 WO HCPCS: Performed by: NURSE ANESTHETIST, CERTIFIED REGISTERED

## 2024-07-31 PROCEDURE — C1732 CATH, EP, DIAG/ABL, 3D/VECT: HCPCS | Performed by: INTERNAL MEDICINE

## 2024-07-31 RX ORDER — FENTANYL CITRATE 50 UG/ML
INJECTION, SOLUTION INTRAMUSCULAR; INTRAVENOUS PRN
Status: DISCONTINUED | OUTPATIENT
Start: 2024-07-31 | End: 2024-07-31 | Stop reason: SDUPTHER

## 2024-07-31 RX ORDER — SODIUM CHLORIDE, SODIUM LACTATE, POTASSIUM CHLORIDE, CALCIUM CHLORIDE 600; 310; 30; 20 MG/100ML; MG/100ML; MG/100ML; MG/100ML
INJECTION, SOLUTION INTRAVENOUS CONTINUOUS PRN
Status: DISCONTINUED | OUTPATIENT
Start: 2024-07-31 | End: 2024-07-31 | Stop reason: SDUPTHER

## 2024-07-31 RX ORDER — PROTAMINE SULFATE 10 MG/ML
INJECTION, SOLUTION INTRAVENOUS PRN
Status: DISCONTINUED | OUTPATIENT
Start: 2024-07-31 | End: 2024-07-31 | Stop reason: SDUPTHER

## 2024-07-31 RX ORDER — HEPARIN SODIUM 1000 [USP'U]/ML
INJECTION, SOLUTION INTRAVENOUS; SUBCUTANEOUS PRN
Status: DISCONTINUED | OUTPATIENT
Start: 2024-07-31 | End: 2024-07-31 | Stop reason: SDUPTHER

## 2024-07-31 RX ORDER — SODIUM CHLORIDE 0.9 % (FLUSH) 0.9 %
5-40 SYRINGE (ML) INJECTION PRN
Status: DISCONTINUED | OUTPATIENT
Start: 2024-07-31 | End: 2024-07-31 | Stop reason: HOSPADM

## 2024-07-31 RX ORDER — SODIUM CHLORIDE 0.9 % (FLUSH) 0.9 %
5-40 SYRINGE (ML) INJECTION EVERY 12 HOURS SCHEDULED
Status: DISCONTINUED | OUTPATIENT
Start: 2024-07-31 | End: 2024-07-31 | Stop reason: HOSPADM

## 2024-07-31 RX ORDER — SODIUM CHLORIDE 9 MG/ML
INJECTION, SOLUTION INTRAVENOUS PRN
Status: DISCONTINUED | OUTPATIENT
Start: 2024-07-31 | End: 2024-07-31 | Stop reason: HOSPADM

## 2024-07-31 RX ORDER — FUROSEMIDE 10 MG/ML
INJECTION INTRAMUSCULAR; INTRAVENOUS PRN
Status: DISCONTINUED | OUTPATIENT
Start: 2024-07-31 | End: 2024-07-31 | Stop reason: SDUPTHER

## 2024-07-31 RX ORDER — SODIUM CHLORIDE 9 MG/ML
INJECTION, SOLUTION INTRAVENOUS CONTINUOUS
Status: DISCONTINUED | OUTPATIENT
Start: 2024-07-31 | End: 2024-07-31 | Stop reason: HOSPADM

## 2024-07-31 RX ORDER — MIDAZOLAM HYDROCHLORIDE 1 MG/ML
INJECTION INTRAMUSCULAR; INTRAVENOUS PRN
Status: DISCONTINUED | OUTPATIENT
Start: 2024-07-31 | End: 2024-07-31 | Stop reason: SDUPTHER

## 2024-07-31 RX ORDER — OXYCODONE HYDROCHLORIDE 5 MG/1
5 TABLET ORAL EVERY 4 HOURS PRN
Status: DISCONTINUED | OUTPATIENT
Start: 2024-07-31 | End: 2024-07-31 | Stop reason: HOSPADM

## 2024-07-31 RX ORDER — ACETAMINOPHEN 325 MG/1
650 TABLET ORAL EVERY 4 HOURS PRN
Status: DISCONTINUED | OUTPATIENT
Start: 2024-07-31 | End: 2024-07-31 | Stop reason: HOSPADM

## 2024-07-31 RX ORDER — ROCURONIUM BROMIDE 10 MG/ML
INJECTION, SOLUTION INTRAVENOUS PRN
Status: DISCONTINUED | OUTPATIENT
Start: 2024-07-31 | End: 2024-07-31 | Stop reason: SDUPTHER

## 2024-07-31 RX ORDER — PROPOFOL 10 MG/ML
INJECTION, EMULSION INTRAVENOUS PRN
Status: DISCONTINUED | OUTPATIENT
Start: 2024-07-31 | End: 2024-07-31 | Stop reason: SDUPTHER

## 2024-07-31 RX ORDER — LIDOCAINE HYDROCHLORIDE 10 MG/ML
INJECTION, SOLUTION INFILTRATION; PERINEURAL PRN
Status: DISCONTINUED | OUTPATIENT
Start: 2024-07-31 | End: 2024-07-31 | Stop reason: SDUPTHER

## 2024-07-31 RX ORDER — HEPARIN SODIUM 10000 [USP'U]/100ML
INJECTION, SOLUTION INTRAVENOUS CONTINUOUS PRN
Status: DISCONTINUED | OUTPATIENT
Start: 2024-07-31 | End: 2024-07-31 | Stop reason: SDUPTHER

## 2024-07-31 RX ORDER — PANTOPRAZOLE SODIUM 40 MG/1
40 TABLET, DELAYED RELEASE ORAL
Qty: 30 TABLET | Refills: 0 | Status: SHIPPED | OUTPATIENT
Start: 2024-07-31

## 2024-07-31 RX ADMIN — ROCURONIUM BROMIDE 20 MG: 10 INJECTION, SOLUTION INTRAVENOUS at 13:02

## 2024-07-31 RX ADMIN — ROCURONIUM BROMIDE 50 MG: 10 INJECTION, SOLUTION INTRAVENOUS at 08:16

## 2024-07-31 RX ADMIN — PHENYLEPHRINE HYDROCHLORIDE 100 MCG: 10 INJECTION INTRAVENOUS at 13:25

## 2024-07-31 RX ADMIN — PHENYLEPHRINE HYDROCHLORIDE 100 MCG: 10 INJECTION INTRAVENOUS at 12:09

## 2024-07-31 RX ADMIN — FUROSEMIDE 40 MG: 10 INJECTION, SOLUTION INTRAMUSCULAR; INTRAVENOUS at 14:04

## 2024-07-31 RX ADMIN — SODIUM CHLORIDE: 9 INJECTION, SOLUTION INTRAVENOUS at 07:07

## 2024-07-31 RX ADMIN — ROCURONIUM BROMIDE 50 MG: 10 INJECTION, SOLUTION INTRAVENOUS at 09:07

## 2024-07-31 RX ADMIN — PROTAMINE SULFATE 10 MG: 10 INJECTION, SOLUTION INTRAVENOUS at 13:17

## 2024-07-31 RX ADMIN — PHENYLEPHRINE HYDROCHLORIDE 50 MCG/MIN: 10 INJECTION INTRAVENOUS at 08:30

## 2024-07-31 RX ADMIN — SODIUM CHLORIDE, SODIUM LACTATE, POTASSIUM CHLORIDE, CALCIUM CHLORIDE: 600; 310; 30; 20 INJECTION, SOLUTION INTRAVENOUS at 08:05

## 2024-07-31 RX ADMIN — SUGAMMADEX 248 MG: 100 INJECTION, SOLUTION INTRAVENOUS at 13:41

## 2024-07-31 RX ADMIN — PROPOFOL 150 MG: 10 INJECTION, EMULSION INTRAVENOUS at 08:16

## 2024-07-31 RX ADMIN — HEPARIN SODIUM 1000 UNITS: 1000 INJECTION INTRAVENOUS; SUBCUTANEOUS at 11:50

## 2024-07-31 RX ADMIN — PHENYLEPHRINE HYDROCHLORIDE 100 MCG: 10 INJECTION INTRAVENOUS at 08:28

## 2024-07-31 RX ADMIN — PROTAMINE SULFATE 90 MG: 10 INJECTION, SOLUTION INTRAVENOUS at 13:22

## 2024-07-31 RX ADMIN — ROCURONIUM BROMIDE 20 MG: 10 INJECTION, SOLUTION INTRAVENOUS at 10:48

## 2024-07-31 RX ADMIN — HEPARIN SODIUM 3000 UNITS: 1000 INJECTION INTRAVENOUS; SUBCUTANEOUS at 10:06

## 2024-07-31 RX ADMIN — HEPARIN SODIUM 1000 UNITS/HR: 10000 INJECTION, SOLUTION INTRAVENOUS at 09:15

## 2024-07-31 RX ADMIN — MIDAZOLAM 2 MG: 1 INJECTION INTRAMUSCULAR; INTRAVENOUS at 08:13

## 2024-07-31 RX ADMIN — HEPARIN SODIUM 3000 UNITS: 1000 INJECTION INTRAVENOUS; SUBCUTANEOUS at 10:37

## 2024-07-31 RX ADMIN — FENTANYL CITRATE 100 MCG: 50 INJECTION, SOLUTION INTRAMUSCULAR; INTRAVENOUS at 08:16

## 2024-07-31 RX ADMIN — Medication 2 MCG/MIN: at 11:50

## 2024-07-31 RX ADMIN — ROCURONIUM BROMIDE 30 MG: 10 INJECTION, SOLUTION INTRAVENOUS at 10:16

## 2024-07-31 RX ADMIN — LIDOCAINE HYDROCHLORIDE 5 ML: 10 INJECTION, SOLUTION INFILTRATION; PERINEURAL at 08:16

## 2024-07-31 RX ADMIN — HEPARIN SODIUM 15000 UNITS: 1000 INJECTION INTRAVENOUS; SUBCUTANEOUS at 09:12

## 2024-07-31 ASSESSMENT — COPD QUESTIONNAIRES: CAT_SEVERITY: NO INTERVAL CHANGE

## 2024-07-31 NOTE — PROCEDURES
call center.  The same request was made for any encounters with any health care facility such as other doctors offices, urgent  care centers, emergency rooms and hospitals.      Juan Hernández MD  Clinical Cardiac Electrophysiologist  [facility]

## 2024-07-31 NOTE — ANESTHESIA PRE PROCEDURE
and risks discussed with patient.    Use of blood products discussed with patient whom consented to blood products.    Plan discussed with CRNA.                Shalini Meyers MD   7/31/2024

## 2024-07-31 NOTE — PROGRESS NOTES
Patient admitted, consent signed and questions answered. Patient ready for procedure. Call light to reach with side rails up 2 of 2. Bilateral groin clipped with writer and Andressa RN present.  Family at bedside with patient.  History and physical needs to be completed.

## 2024-07-31 NOTE — H&P
relief after 1 dose, call 911. 12/27/22   Carole Norman MD   loratadine (CLARITIN) 10 MG tablet Take 1 tablet by mouth daily    ProviderAna MD   aspirin 81 MG chewable tablet Take 1 tablet by mouth daily 12/12/17   Siva Short MD   montelukast (SINGULAIR) 10 MG tablet Take 1 tablet by mouth nightly    Ana Rausch MD   albuterol (PROVENTIL) (2.5 MG/3ML) 0.083% nebulizer solution Take 3 mLs by nebulization 4 times daily Indications: patient reported taking as needed    ProviderAna MD   Fluticasone furoate-vilanterol (BREO ELLIPTA) 200-25 MCG/INH AEPB inhaler Inhale 200 mcg into the lungs daily    ProviderAna MD       ALLERGIES  Penicillins         CURRENT MEDICATIONS:        OBJECTIVE:     PHYSICAL EXAM    Vital Signs: /62   Pulse 74   Temp 97.9 °F (36.6 °C)   Resp 17   Ht 1.803 m (5' 11\")   Wt 123.8 kg (273 lb)   SpO2 97%   BMI 38.08 kg/m²     Lungs: CTAB  Heart: Regular rate  Abdomen: Soft, non-tender. Bowel sounds normal,  no organomegaly  Extremities: + pulses  Neurologic: Grossly normal, A&O x3      ASSESSMENT/PLAN:    Radiofrequency ablation for atrial fibrillation +/- SVT  Cardio mapping  General anesthesia        Juan Hernández MD  Clinical Cardiac Electrophysiologist  [facility]

## 2024-07-31 NOTE — ANESTHESIA POSTPROCEDURE EVALUATION
Department of Anesthesiology  Postprocedure Note    Patient: Wallace Moore  MRN: [id number]  YOB: 1950  Date of evaluation: 7/31/2024    Procedure Summary       Date: 07/31/24 Room / Location: [facility] EP LAB  2 / [facility] CARDIAC CATH LAB    Anesthesia Start: 0805 Anesthesia Stop: 1413    Procedure: rupesh / Ablation A-fib w/anes / carto Diagnosis:       AV ever re-entry tachycardia (HCC)      (Persistent atrial fibrillation  AV ever reentry tachycardia  Typical atrial flutter)    Providers: Juan Hernández MD Responsible Provider: Shalini Meyers MD    Anesthesia Type: general ASA Status: 4            Anesthesia Type: No value filed.    Opal Phase I: Opal Score: 9    Opal Phase II:      Anesthesia Post Evaluation    Patient location during evaluation: ICU  Patient participation: complete - patient participated  Level of consciousness: awake and alert  Pain score: 0  Airway patency: patent  Nausea & Vomiting: no vomiting and no nausea  Cardiovascular status: hemodynamically stable  Respiratory status: acceptable  Hydration status: stable  Pain management: adequate    There were no known notable events for this encounter.

## 2024-07-31 NOTE — PROGRESS NOTES
All discharge instructions reviewed, questions answered. Pulses obtained & documented. Pt ambulatory. Site clean dry and intact. No bleeding, hematoma, or bruising noted. Patient discharged with all belongings. Patient discharged via wheelchair by writer. IV removed.

## 2024-07-31 NOTE — PROGRESS NOTES
Received post procedure to [facility] to room 6. Assessment obtained. Restrictions reviewed with patient. Post procedure pathway initiated.  Right groin site soft, dressing dry and intact.  No hematoma noted.  Family at side.  Patient without complaints. Head of bed flat with right leg straight.

## 2024-07-31 NOTE — DISCHARGE INSTRUCTIONS
tolerated.   Do not drive for 24 hours.  Do not operate equipment for 24 hours (lawnmowers, power tools, kitchen accessories, stove).  Do not drink any alcoholic beverages for a minimum of 24 hours.  Do not make important personal, legal or business decisions for 24 hours.  Drink extra amounts of fluids today.  If you received anesthesia and have not urinated within 8 hours after the procedure call your doctor.

## 2024-08-01 LAB — ECHO BSA: 2.49 M2

## 2024-08-05 ENCOUNTER — CARE COORDINATION (OUTPATIENT)
Dept: CARE COORDINATION | Age: 74
End: 2024-08-05

## 2024-08-05 NOTE — CARE COORDINATION
Ambulatory Care Coordination Note     8/5/2024 3:32 PM     Patient outreach attempt by this ACM today to offer care management services. ACM was unable to reach the patient by telephone today; voicemail full and unable to leave a message.  [employer] message sent to patient.     ACM: Rehana Evans RN     Care Summary Note: He had ablation done 7/31 for a fib and SVT. To follow up with EP Dr. Hernández.    PCP/Specialist follow up:   Future Appointments         Provider Specialty Dept Phone    9/10/2024 3:00 PM Carole Norman MD Family Medicine [phone]    9/19/2024 1:00 PM Rashid Chadwick DPM Podiatry [phone]            Follow Up:   Plan for next ACM outreach in approximately 1 week to complete:  - outreach attempt to offer care management services.

## 2024-08-06 DIAGNOSIS — F41.1 GAD (GENERALIZED ANXIETY DISORDER): ICD-10-CM

## 2024-08-06 DIAGNOSIS — F33.2 MODERATELY SEVERE RECURRENT MAJOR DEPRESSION (HCC): ICD-10-CM

## 2024-08-06 RX ORDER — DULOXETIN HYDROCHLORIDE 60 MG/1
120 CAPSULE, DELAYED RELEASE ORAL DAILY
Qty: 180 CAPSULE | Refills: 1 | Status: SHIPPED | OUTPATIENT
Start: 2024-08-06

## 2024-08-06 NOTE — TELEPHONE ENCOUNTER
Last visit: 5/20/24  Last Med refill: 2/16/24  Does patient have enough medication for 72 hours: Yes    Next Visit Date:  Future Appointments   Date Time Provider Department Center   9/10/2024  3:00 PM Carole Norman MD [facility] DEP   9/19/2024  1:00 PM Rashid Chadwick, [facility]       Health Maintenance   Topic Date Due    Shingles vaccine (1 of 2) Never done    Lipids  03/23/2023    Annual Wellness Visit (Medicare Advantage)  01/01/2024    Diabetic retinal exam  03/13/2024    Colorectal Cancer Screen  03/25/2024    Diabetic Alb to Cr ratio (uACR) test  05/03/2024    Flu vaccine (1) 08/01/2024    A1C test (Diabetic or Prediabetic)  08/20/2024    Pneumococcal 65+ years Vaccine (2 of 2 - PCV) 05/18/2025 (Originally 12/22/2021)    Depression Monitoring  03/05/2025    Diabetic foot exam  06/14/2025    GFR test (Diabetes, CKD 3-4, OR last GFR 15-59)  07/11/2025    DTaP/Tdap/Td vaccine (2 - Td or Tdap) 09/26/2032    COVID-19 Vaccine  Completed    Respiratory Syncytial Virus (RSV) Pregnant or age 60 yrs+  Completed    AAA screen  Completed    Hepatitis C screen  Completed    Hepatitis A vaccine  Aged Out    Hepatitis B vaccine  Aged Out    Hib vaccine  Aged Out    Polio vaccine  Aged Out    Meningococcal (ACWY) vaccine  Aged Out    Prostate Specific Antigen (PSA) Screening or Monitoring  Discontinued       Hemoglobin A1C (%)   Date Value   05/20/2024 10.6   02/13/2024 8.1 (H)   09/07/2023 8.3             ( goal A1C is < 7)   No components found for: \"LABMICR\"  No components found for: \"LDLCHOLESTEROL\", \"LDLCALC\"    (goal LDL is <100)   AST (U/L)   Date Value   02/12/2024 13     ALT (U/L)   Date Value   02/12/2024 20     BUN (mg/dL)   Date Value   07/11/2024 19     BP Readings from Last 3 Encounters:   07/31/24 (!) 143/93   05/20/24 110/76   05/01/24 110/68          (goal 120/80)    All Future Testing planned in [facility]  Lab Frequency Next Occurrence   MRI LUMBAR SPINE WO CONTRAST Once

## 2024-08-12 ENCOUNTER — CARE COORDINATION (OUTPATIENT)
Dept: CARE COORDINATION | Age: 74
End: 2024-08-12

## 2024-08-12 NOTE — CARE COORDINATION
Ambulatory Care Coordination Note     8/12/2024 2:21 PM     Patient outreach attempt by this ACM today to offer care management services. ACM was unable to reach the patient by telephone today; voicemail full and unable to leave a message.      ACM: Rehana Evans RN     Care Summary Note: Second attempt to contact.    PCP/Specialist follow up:   Future Appointments         Provider Specialty Dept Phone    9/10/2024 3:00 PM Carole Norman MD Family Medicine [phone]    9/19/2024 1:00 PM Rashid Chadwick DPM Podiatry [phone]            Follow Up:   Plan for next ACM outreach in approximately 1 week to complete:  - outreach attempt to offer care management services.

## 2024-08-16 DIAGNOSIS — M45.0 ANKYLOSING SPONDYLITIS OF MULTIPLE SITES IN SPINE (HCC): ICD-10-CM

## 2024-08-16 DIAGNOSIS — M17.11 PRIMARY OSTEOARTHRITIS OF RIGHT KNEE: ICD-10-CM

## 2024-08-16 DIAGNOSIS — M54.41 ACUTE RIGHT-SIDED LOW BACK PAIN WITH RIGHT-SIDED SCIATICA: ICD-10-CM

## 2024-08-16 DIAGNOSIS — Z79.01 LONG TERM CURRENT USE OF ANTICOAGULANT: ICD-10-CM

## 2024-08-16 NOTE — TELEPHONE ENCOUNTER
LUMBAR SPINE WO CONTRAST Once 04/08/2024   Microalbumin, Ur Once 06/20/2024   Lipid Panel Once 06/19/2024               Patient Active Problem List:     Ankylosing spondylitis of multiple sites in spine (HCC)     Chronic silicosis ([facility])     Moderate persistent asthma without complication     Coronary artery disease involving native coronary artery of native heart without angina pectoris     History of ST elevation myocardial infarction (STEMI)     Class 2 severe obesity due to excess calories with serious comorbidity and body mass index (BMI) of 39.0 to 39.9 in adult ([facility])     Moderately severe recurrent major depression ([facility])     Eczema of face     Benign prostatic hyperplasia with nocturia     Type 2 diabetes mellitus with other circulatory complication, without long-term current use of insulin ([facility])     Angina pectoris, unspecified     Atherosclerotic heart disease of native coronary artery with unspecified angina pectoris     Atrial fibrillation ([facility])     New onset atrial fibrillation ([facility])     Abnormal echocardiogram     SVT (supraventricular tachycardia) ([facility])     COPD without exacerbation ([facility])     Primary hypertension     DEB treated with BiPAP     Heat syncope     Cardiomyopathy ([facility])     Chronic systolic (congestive) heart failure     Acute right-sided low back pain with right-sided sciatica     Primary osteoarthritis of right knee

## 2024-08-19 ENCOUNTER — CARE COORDINATION (OUTPATIENT)
Dept: CARE COORDINATION | Age: 74
End: 2024-08-19

## 2024-08-19 RX ORDER — OXYCODONE HYDROCHLORIDE 5 MG/1
5 TABLET ORAL EVERY 12 HOURS PRN
Qty: 60 TABLET | Refills: 0 | Status: SHIPPED | OUTPATIENT
Start: 2024-08-19 | End: 2024-09-18

## 2024-08-19 NOTE — CARE COORDINATION
Ambulatory Care Coordination Note     8/19/2024 10:32 AM     patient outreach attempt by this AC today to offer care management services. ACM was unable to reach the patient by telephone today; voicemail full and unable to leave a message.      Patient closed (unable to reach patient) from the High Risk Care Management program on 8/19/2024. This is third attempt to contact patient by phone, also sent him a NetLext message.

## 2024-09-10 ENCOUNTER — OFFICE VISIT (OUTPATIENT)
Dept: FAMILY MEDICINE CLINIC | Age: 74
End: 2024-09-10

## 2024-09-10 VITALS
HEIGHT: 70 IN | SYSTOLIC BLOOD PRESSURE: 134 MMHG | BODY MASS INDEX: 39.57 KG/M2 | HEART RATE: 90 BPM | DIASTOLIC BLOOD PRESSURE: 78 MMHG | OXYGEN SATURATION: 96 % | WEIGHT: 276.4 LBS

## 2024-09-10 DIAGNOSIS — E66.01 CLASS 3 SEVERE OBESITY DUE TO EXCESS CALORIES WITH SERIOUS COMORBIDITY AND BODY MASS INDEX (BMI) OF 40.0 TO 44.9 IN ADULT (HCC): ICD-10-CM

## 2024-09-10 DIAGNOSIS — I48.0 PAROXYSMAL ATRIAL FIBRILLATION (HCC): ICD-10-CM

## 2024-09-10 DIAGNOSIS — Z13.6 SCREENING FOR CARDIOVASCULAR CONDITION: ICD-10-CM

## 2024-09-10 DIAGNOSIS — Z00.00 MEDICARE ANNUAL WELLNESS VISIT, SUBSEQUENT: Primary | ICD-10-CM

## 2024-09-10 DIAGNOSIS — E66.01 CLASS 2 SEVERE OBESITY DUE TO EXCESS CALORIES WITH SERIOUS COMORBIDITY AND BODY MASS INDEX (BMI) OF 39.0 TO 39.9 IN ADULT (HCC): ICD-10-CM

## 2024-09-10 DIAGNOSIS — E78.5 DYSLIPIDEMIA DUE TO TYPE 2 DIABETES MELLITUS (HCC): ICD-10-CM

## 2024-09-10 DIAGNOSIS — J45.40 MODERATE PERSISTENT ASTHMA WITHOUT COMPLICATION: ICD-10-CM

## 2024-09-10 DIAGNOSIS — F33.2 MODERATELY SEVERE RECURRENT MAJOR DEPRESSION (HCC): ICD-10-CM

## 2024-09-10 DIAGNOSIS — M45.0 ANKYLOSING SPONDYLITIS OF MULTIPLE SITES IN SPINE (HCC): ICD-10-CM

## 2024-09-10 DIAGNOSIS — Z23 NEED FOR INFLUENZA VACCINATION: ICD-10-CM

## 2024-09-10 DIAGNOSIS — E11.59 TYPE 2 DIABETES MELLITUS WITH OTHER CIRCULATORY COMPLICATION, WITHOUT LONG-TERM CURRENT USE OF INSULIN (HCC): ICD-10-CM

## 2024-09-10 DIAGNOSIS — J62.8 CHRONIC SILICOSIS (HCC): ICD-10-CM

## 2024-09-10 DIAGNOSIS — I47.10 SVT (SUPRAVENTRICULAR TACHYCARDIA) (HCC): ICD-10-CM

## 2024-09-10 DIAGNOSIS — E11.69 DYSLIPIDEMIA DUE TO TYPE 2 DIABETES MELLITUS (HCC): ICD-10-CM

## 2024-09-10 LAB — HBA1C MFR BLD: 7.8 %

## 2024-09-10 ASSESSMENT — PATIENT HEALTH QUESTIONNAIRE - PHQ9
2. FEELING DOWN, DEPRESSED OR HOPELESS: NOT AT ALL
3. TROUBLE FALLING OR STAYING ASLEEP: NOT AT ALL
SUM OF ALL RESPONSES TO PHQ QUESTIONS 1-9: 1
SUM OF ALL RESPONSES TO PHQ QUESTIONS 1-9: 1
SUM OF ALL RESPONSES TO PHQ9 QUESTIONS 1 & 2: 0
1. LITTLE INTEREST OR PLEASURE IN DOING THINGS: NOT AT ALL
SUM OF ALL RESPONSES TO PHQ QUESTIONS 1-9: 1
8. MOVING OR SPEAKING SO SLOWLY THAT OTHER PEOPLE COULD HAVE NOTICED. OR THE OPPOSITE, BEING SO FIGETY OR RESTLESS THAT YOU HAVE BEEN MOVING AROUND A LOT MORE THAN USUAL: NOT AT ALL
4. FEELING TIRED OR HAVING LITTLE ENERGY: SEVERAL DAYS
7. TROUBLE CONCENTRATING ON THINGS, SUCH AS READING THE NEWSPAPER OR WATCHING TELEVISION: NOT AT ALL
10. IF YOU CHECKED OFF ANY PROBLEMS, HOW DIFFICULT HAVE THESE PROBLEMS MADE IT FOR YOU TO DO YOUR WORK, TAKE CARE OF THINGS AT HOME, OR GET ALONG WITH OTHER PEOPLE: NOT DIFFICULT AT ALL
6. FEELING BAD ABOUT YOURSELF - OR THAT YOU ARE A FAILURE OR HAVE LET YOURSELF OR YOUR FAMILY DOWN: NOT AT ALL
9. THOUGHTS THAT YOU WOULD BE BETTER OFF DEAD, OR OF HURTING YOURSELF: NOT AT ALL
5. POOR APPETITE OR OVEREATING: NOT AT ALL
SUM OF ALL RESPONSES TO PHQ QUESTIONS 1-9: 1

## 2024-09-15 DIAGNOSIS — M54.41 ACUTE RIGHT-SIDED LOW BACK PAIN WITH RIGHT-SIDED SCIATICA: ICD-10-CM

## 2024-09-15 DIAGNOSIS — N40.1 BENIGN PROSTATIC HYPERPLASIA WITH NOCTURIA: ICD-10-CM

## 2024-09-15 DIAGNOSIS — M17.11 PRIMARY OSTEOARTHRITIS OF RIGHT KNEE: ICD-10-CM

## 2024-09-15 DIAGNOSIS — M45.0 ANKYLOSING SPONDYLITIS OF MULTIPLE SITES IN SPINE (HCC): ICD-10-CM

## 2024-09-15 DIAGNOSIS — R35.1 BENIGN PROSTATIC HYPERPLASIA WITH NOCTURIA: ICD-10-CM

## 2024-09-15 DIAGNOSIS — Z79.01 LONG TERM CURRENT USE OF ANTICOAGULANT: ICD-10-CM

## 2024-09-16 RX ORDER — TAMSULOSIN HYDROCHLORIDE 0.4 MG/1
0.8 CAPSULE ORAL DAILY
Qty: 180 CAPSULE | Refills: 1 | Status: SHIPPED | OUTPATIENT
Start: 2024-09-16

## 2024-09-16 RX ORDER — OXYCODONE HYDROCHLORIDE 5 MG/1
5 TABLET ORAL EVERY 12 HOURS PRN
Qty: 60 TABLET | Refills: 0 | Status: SHIPPED | OUTPATIENT
Start: 2024-09-16 | End: 2024-10-16

## 2024-09-26 RX ORDER — APIXABAN 5 MG/1
5 TABLET, FILM COATED ORAL 2 TIMES DAILY
Qty: 60 TABLET | Refills: 5 | Status: SHIPPED | OUTPATIENT
Start: 2024-09-26

## 2024-10-15 DIAGNOSIS — M45.0 ANKYLOSING SPONDYLITIS OF MULTIPLE SITES IN SPINE (HCC): ICD-10-CM

## 2024-10-15 DIAGNOSIS — Z79.01 LONG TERM CURRENT USE OF ANTICOAGULANT: ICD-10-CM

## 2024-10-15 DIAGNOSIS — M17.11 PRIMARY OSTEOARTHRITIS OF RIGHT KNEE: ICD-10-CM

## 2024-10-15 DIAGNOSIS — M54.41 ACUTE RIGHT-SIDED LOW BACK PAIN WITH RIGHT-SIDED SCIATICA: ICD-10-CM

## 2024-10-15 RX ORDER — OXYCODONE HYDROCHLORIDE 5 MG/1
5 TABLET ORAL EVERY 12 HOURS PRN
Qty: 60 TABLET | Refills: 0 | Status: SHIPPED | OUTPATIENT
Start: 2024-10-15 | End: 2024-11-14

## 2024-10-15 NOTE — TELEPHONE ENCOUNTER
Wallace Moore is calling to request a refill on the following medication(s):    Last Visit Date (If Applicable):  9/10/2024    Next Visit Date:    1/13/2025    Medication Request:  Requested Prescriptions     Pending Prescriptions Disp Refills    oxyCODONE (ROXICODONE) 5 MG immediate release tablet 60 tablet 0     Sig: Take 1 tablet by mouth every 12 hours as needed for Pain for up to 30 days. Max Daily Amount: 10 mg

## 2024-10-24 DIAGNOSIS — R35.1 BENIGN PROSTATIC HYPERPLASIA WITH NOCTURIA: ICD-10-CM

## 2024-10-24 DIAGNOSIS — N40.1 BENIGN PROSTATIC HYPERPLASIA WITH NOCTURIA: ICD-10-CM

## 2024-10-24 DIAGNOSIS — F41.1 GAD (GENERALIZED ANXIETY DISORDER): ICD-10-CM

## 2024-10-24 DIAGNOSIS — E11.65 UNCONTROLLED TYPE 2 DIABETES MELLITUS WITH HYPERGLYCEMIA (HCC): ICD-10-CM

## 2024-10-24 NOTE — TELEPHONE ENCOUNTER
Last visit: 09/10/24  Last Med refill: 07/26/24  Does patient have enough medication for 72 hours: Yes    Next Visit Date:  Future Appointments   Date Time Provider Department Center   1/13/2025  1:45 PM Carole Norman MD [facility] DEP       Health Maintenance   Topic Date Due    Lipids  03/23/2023    Diabetic retinal exam  03/13/2024    Colorectal Cancer Screen  03/25/2024    Diabetic Alb to Cr ratio (uACR) test  05/03/2024    Pneumococcal 65+ years Vaccine (2 of 2 - PCV) 05/18/2025 (Originally 12/22/2021)    Shingles vaccine (1 of 2) 09/10/2025 (Originally 6/9/2000)    COVID-19 Vaccine (6 - 2023-24 season) 09/10/2025 (Originally 9/1/2024)    Diabetic foot exam  06/14/2025    GFR test (Diabetes, CKD 3-4, OR last GFR 15-59)  07/11/2025    A1C test (Diabetic or Prediabetic)  09/10/2025    Depression Monitoring  09/10/2025    DTaP/Tdap/Td vaccine (2 - Td or Tdap) 09/26/2032    Annual Wellness Visit (Medicare Advantage)  Completed    Flu vaccine  Completed    Respiratory Syncytial Virus (RSV) Pregnant or age 60 yrs+  Completed    AAA screen  Completed    Hepatitis C screen  Completed    Hepatitis A vaccine  Aged Out    Hepatitis B vaccine  Aged Out    Hib vaccine  Aged Out    Polio vaccine  Aged Out    Meningococcal (ACWY) vaccine  Aged Out    Prostate Specific Antigen (PSA) Screening or Monitoring  Discontinued       Hemoglobin A1C (%)   Date Value   09/10/2024 7.8   05/20/2024 10.6   02/13/2024 8.1 (H)             ( goal A1C is < 7)   No components found for: \"LABMICR\"  No components found for: \"LDLCHOLESTEROL\", \"LDLCALC\"    (goal LDL is <100)   AST (U/L)   Date Value   02/12/2024 13     ALT (U/L)   Date Value   02/12/2024 20     BUN (mg/dL)   Date Value   07/11/2024 19     BP Readings from Last 3 Encounters:   09/10/24 134/78   07/31/24 (!) 143/93   05/20/24 110/76          (goal 120/80)    All Future Testing planned in [facility]  Lab Frequency Next Occurrence   MRI LUMBAR SPINE WO CONTRAST Once

## 2024-10-27 RX ORDER — BUSPIRONE HYDROCHLORIDE 10 MG/1
TABLET ORAL
Qty: 180 TABLET | Refills: 1 | Status: SHIPPED | OUTPATIENT
Start: 2024-10-27

## 2024-11-13 DIAGNOSIS — E11.65 UNCONTROLLED TYPE 2 DIABETES MELLITUS WITH HYPERGLYCEMIA (HCC): ICD-10-CM

## 2024-11-13 DIAGNOSIS — N40.1 BENIGN PROSTATIC HYPERPLASIA WITH NOCTURIA: ICD-10-CM

## 2024-11-13 DIAGNOSIS — R35.1 BENIGN PROSTATIC HYPERPLASIA WITH NOCTURIA: ICD-10-CM

## 2024-11-13 RX ORDER — FINASTERIDE 5 MG/1
5 TABLET, FILM COATED ORAL DAILY
Qty: 30 TABLET | Refills: 3 | Status: SHIPPED | OUTPATIENT
Start: 2024-11-13

## 2024-11-13 RX ORDER — INSULIN GLARGINE 100 [IU]/ML
12 INJECTION, SOLUTION SUBCUTANEOUS NIGHTLY
Qty: 5 ADJUSTABLE DOSE PRE-FILLED PEN SYRINGE | Refills: 0 | Status: SHIPPED | OUTPATIENT
Start: 2024-11-13

## 2024-11-13 NOTE — TELEPHONE ENCOUNTER
Last visit: 09/10/24  Last Med refill: 10/07/24  Does patient have enough medication for 72 hours: NO    Next Visit Date:  Future Appointments   Date Time Provider Department Center   1/13/2025  1:45 PM Carole Norman MD [facility] DEP       Health Maintenance   Topic Date Due    Lipids  03/23/2023    Diabetic retinal exam  03/13/2024    Colorectal Cancer Screen  03/25/2024    Diabetic Alb to Cr ratio (uACR) test  05/03/2024    Pneumococcal 65+ years Vaccine (2 of 2 - PCV) 05/18/2025 (Originally 12/22/2021)    Shingles vaccine (1 of 2) 09/10/2025 (Originally 6/9/2000)    COVID-19 Vaccine (6 - 2023-24 season) 09/10/2025 (Originally 9/1/2024)    Diabetic foot exam  06/14/2025    GFR test (Diabetes, CKD 3-4, OR last GFR 15-59)  07/11/2025    A1C test (Diabetic or Prediabetic)  09/10/2025    Depression Monitoring  09/10/2025    DTaP/Tdap/Td vaccine (2 - Td or Tdap) 09/26/2032    Annual Wellness Visit (Medicare Advantage)  Completed    Flu vaccine  Completed    Respiratory Syncytial Virus (RSV) Pregnant or age 60 yrs+  Completed    AAA screen  Completed    Hepatitis C screen  Completed    Hepatitis A vaccine  Aged Out    Hepatitis B vaccine  Aged Out    Hib vaccine  Aged Out    Polio vaccine  Aged Out    Meningococcal (ACWY) vaccine  Aged Out    Prostate Specific Antigen (PSA) Screening or Monitoring  Discontinued       Hemoglobin A1C (%)   Date Value   09/10/2024 7.8   05/20/2024 10.6   02/13/2024 8.1 (H)             ( goal A1C is < 7)   No components found for: \"LABMICR\"  No components found for: \"LDLCHOLESTEROL\", \"LDLCALC\"    (goal LDL is <100)   AST (U/L)   Date Value   02/12/2024 13     ALT (U/L)   Date Value   02/12/2024 20     BUN (mg/dL)   Date Value   07/11/2024 19     BP Readings from Last 3 Encounters:   09/10/24 134/78   07/31/24 (!) 143/93   05/20/24 110/76          (goal 120/80)    All Future Testing planned in [facility]  Lab Frequency Next Occurrence   MRI LUMBAR SPINE WO CONTRAST Once

## 2024-11-14 DIAGNOSIS — M54.41 ACUTE RIGHT-SIDED LOW BACK PAIN WITH RIGHT-SIDED SCIATICA: ICD-10-CM

## 2024-11-14 DIAGNOSIS — M45.0 ANKYLOSING SPONDYLITIS OF MULTIPLE SITES IN SPINE (HCC): ICD-10-CM

## 2024-11-14 DIAGNOSIS — Z79.01 LONG TERM CURRENT USE OF ANTICOAGULANT: ICD-10-CM

## 2024-11-14 DIAGNOSIS — M17.11 PRIMARY OSTEOARTHRITIS OF RIGHT KNEE: ICD-10-CM

## 2024-11-14 NOTE — TELEPHONE ENCOUNTER
Last visit: 09/10/2024  Last Med refill: 10/15/2024  Does patient have enough medication for 72 hours: No  Pt. Called and States he is out  Next Visit Date:  Future Appointments   Date Time Provider Department Center   1/13/2025  1:45 PM Carole Norman MD [facility] E[facility] DEP       Health Maintenance   Topic Date Due    Lipids  03/23/2023    Diabetic retinal exam  03/13/2024    Colorectal Cancer Screen  03/25/2024    Diabetic Alb to Cr ratio (uACR) test  05/03/2024    Pneumococcal 65+ years Vaccine (2 of 2 - PCV) 05/18/2025 (Originally 12/22/2021)    Shingles vaccine (1 of 2) 09/10/2025 (Originally 6/9/2000)    COVID-19 Vaccine (6 - 2023-24 season) 09/10/2025 (Originally 9/1/2024)    Diabetic foot exam  06/14/2025    GFR test (Diabetes, CKD 3-4, OR last GFR 15-59)  07/11/2025    A1C test (Diabetic or Prediabetic)  09/10/2025    Depression Monitoring  09/10/2025    DTaP/Tdap/Td vaccine (2 - Td or Tdap) 09/26/2032    Annual Wellness Visit (Medicare Advantage)  Completed    Flu vaccine  Completed    Respiratory Syncytial Virus (RSV) Pregnant or age 60 yrs+  Completed    AAA screen  Completed    Hepatitis C screen  Completed    Hepatitis A vaccine  Aged Out    Hepatitis B vaccine  Aged Out    Hib vaccine  Aged Out    Polio vaccine  Aged Out    Meningococcal (ACWY) vaccine  Aged Out    Prostate Specific Antigen (PSA) Screening or Monitoring  Discontinued       Hemoglobin A1C (%)   Date Value   09/10/2024 7.8   05/20/2024 10.6   02/13/2024 8.1 (H)             ( goal A1C is < 7)   No components found for: \"LABMICR\"  No components found for: \"LDLCHOLESTEROL\", \"LDLCALC\"    (goal LDL is <100)   AST (U/L)   Date Value   02/12/2024 13     ALT (U/L)   Date Value   02/12/2024 20     BUN (mg/dL)   Date Value   07/11/2024 19     BP Readings from Last 3 Encounters:   09/10/24 134/78   07/31/24 (!) 143/93   05/20/24 110/76          (goal 120/80)    All Future Testing planned in [facility]  Lab Frequency Next Occurrence   MRI

## 2024-11-15 RX ORDER — OXYCODONE HYDROCHLORIDE 5 MG/1
5 TABLET ORAL EVERY 12 HOURS PRN
Qty: 60 TABLET | Refills: 0 | Status: SHIPPED | OUTPATIENT
Start: 2024-11-15 | End: 2024-12-15

## 2024-11-15 NOTE — TELEPHONE ENCOUNTER
Patient contacted office checking status of refill. Advised patient provider is not in office on Fridays but sometimes she reviews messages.    Patient stated he is completely out and in pain

## 2024-12-11 DIAGNOSIS — M54.41 ACUTE RIGHT-SIDED LOW BACK PAIN WITH RIGHT-SIDED SCIATICA: ICD-10-CM

## 2024-12-11 DIAGNOSIS — Z79.01 LONG TERM CURRENT USE OF ANTICOAGULANT: ICD-10-CM

## 2024-12-11 DIAGNOSIS — M45.0 ANKYLOSING SPONDYLITIS OF MULTIPLE SITES IN SPINE (HCC): ICD-10-CM

## 2024-12-11 DIAGNOSIS — M17.11 PRIMARY OSTEOARTHRITIS OF RIGHT KNEE: ICD-10-CM

## 2024-12-11 NOTE — TELEPHONE ENCOUNTER
Last visit: 09/10/24  Last Med refill: 11/15/24  Does patient have enough medication for 72 hours: No: 2 days left    Next Visit Date:  Future Appointments   Date Time Provider Department Center   1/13/2025  1:45 PM Carole Norman MD [facility] DEP       Health Maintenance   Topic Date Due    Lipids  03/23/2023    Diabetic retinal exam  03/13/2024    Colorectal Cancer Screen  03/25/2024    Diabetic Alb to Cr ratio (uACR) test  05/03/2024    Pneumococcal 65+ years Vaccine (2 of 2 - PCV) 05/18/2025 (Originally 12/22/2021)    Shingles vaccine (1 of 2) 09/10/2025 (Originally 6/9/2000)    COVID-19 Vaccine (6 - 2023-24 season) 09/10/2025 (Originally 9/1/2024)    Diabetic foot exam  06/14/2025    GFR test (Diabetes, CKD 3-4, OR last GFR 15-59)  07/11/2025    A1C test (Diabetic or Prediabetic)  09/10/2025    Depression Monitoring  09/10/2025    DTaP/Tdap/Td vaccine (2 - Td or Tdap) 09/26/2032    Annual Wellness Visit (Medicare Advantage)  Completed    Flu vaccine  Completed    Respiratory Syncytial Virus (RSV) Pregnant or age 60 yrs+  Completed    AAA screen  Completed    Hepatitis C screen  Completed    Hepatitis A vaccine  Aged Out    Hepatitis B vaccine  Aged Out    Hib vaccine  Aged Out    Polio vaccine  Aged Out    Meningococcal (ACWY) vaccine  Aged Out    Prostate Specific Antigen (PSA) Screening or Monitoring  Discontinued       Hemoglobin A1C (%)   Date Value   09/10/2024 7.8   05/20/2024 10.6   02/13/2024 8.1 (H)             ( goal A1C is < 7)   No components found for: \"LABMICR\"  No components found for: \"LDLCHOLESTEROL\", \"LDLCALC\"    (goal LDL is <100)   AST (U/L)   Date Value   02/12/2024 13     ALT (U/L)   Date Value   02/12/2024 20     BUN (mg/dL)   Date Value   07/11/2024 19     BP Readings from Last 3 Encounters:   09/10/24 134/78   07/31/24 (!) 143/93   05/20/24 110/76          (goal 120/80)    All Future Testing planned in [facility]  Lab Frequency Next Occurrence   MRI LUMBAR SPINE WO

## 2024-12-12 RX ORDER — OXYCODONE HYDROCHLORIDE 5 MG/1
5 TABLET ORAL EVERY 12 HOURS PRN
Qty: 60 TABLET | Refills: 0 | Status: SHIPPED | OUTPATIENT
Start: 2024-12-14 | End: 2025-01-13

## 2024-12-27 DIAGNOSIS — E11.59 TYPE 2 DIABETES MELLITUS WITH OTHER CIRCULATORY COMPLICATION, WITHOUT LONG-TERM CURRENT USE OF INSULIN (HCC): ICD-10-CM

## 2024-12-27 NOTE — TELEPHONE ENCOUNTER
04/08/2024   Microalbumin, Ur Once 06/20/2024   Lipid Panel Once 06/19/2024   Hepatic Function Panel Once 09/10/2024               Patient Active Problem List:     Ankylosing spondylitis of multiple sites in spine (HCC)     Chronic silicosis (HCC)     Moderate persistent asthma without complication     Coronary artery disease involving native coronary artery of native heart without angina pectoris     History of ST elevation myocardial infarction (STEMI)     Class 2 severe obesity due to excess calories with serious comorbidity and body mass index (BMI) of 39.0 to 39.9 in adult     Moderately severe recurrent major depression (HCC)     Eczema of face     Benign prostatic hyperplasia with nocturia     Type 2 diabetes mellitus with other circulatory complication, without long-term current use of insulin (HCC)     Angina pectoris, unspecified     Atherosclerotic heart disease of native coronary artery with unspecified angina pectoris     Atrial fibrillation (HCC)     New onset atrial fibrillation (HCC)     Abnormal echocardiogram     SVT (supraventricular tachycardia) (HCC)     COPD without exacerbation (HCC)     Primary hypertension     DEB treated with BiPAP     Heat syncope     Cardiomyopathy (HCC)     Chronic systolic (congestive) heart failure     Acute right-sided low back pain with right-sided sciatica     Primary osteoarthritis of right knee

## 2024-12-31 RX ORDER — METFORMIN HYDROCHLORIDE 500 MG/1
500 TABLET, EXTENDED RELEASE ORAL 2 TIMES DAILY
Qty: 180 TABLET | Refills: 1 | Status: SHIPPED | OUTPATIENT
Start: 2024-12-31

## 2025-01-09 DIAGNOSIS — M54.41 ACUTE RIGHT-SIDED LOW BACK PAIN WITH RIGHT-SIDED SCIATICA: ICD-10-CM

## 2025-01-09 DIAGNOSIS — M45.0 ANKYLOSING SPONDYLITIS OF MULTIPLE SITES IN SPINE (HCC): ICD-10-CM

## 2025-01-09 DIAGNOSIS — M17.11 PRIMARY OSTEOARTHRITIS OF RIGHT KNEE: ICD-10-CM

## 2025-01-09 DIAGNOSIS — Z79.01 LONG TERM CURRENT USE OF ANTICOAGULANT: ICD-10-CM

## 2025-01-09 RX ORDER — OXYCODONE HYDROCHLORIDE 5 MG/1
5 TABLET ORAL EVERY 12 HOURS PRN
Qty: 60 TABLET | Refills: 0 | Status: SHIPPED | OUTPATIENT
Start: 2025-01-13 | End: 2025-02-12

## 2025-01-09 NOTE — TELEPHONE ENCOUNTER
Patient contacted office asking for refill on:  -Oxycodone    [facility] [address]      Last visit: 9/10/24  Last Med refill: 12/14/24  Does patient have enough medication for 72 hours: Yes    Next Visit Date:  Future Appointments   Date Time Provider Department Center   1/13/2025  1:45 PM Carole Norman MD [facility] DEP       Health Maintenance   Topic Date Due    Lipids  03/23/2023    Diabetic retinal exam  03/13/2024    Colorectal Cancer Screen  03/25/2024    Diabetic Alb to Cr ratio (uACR) test  05/03/2024    Annual Wellness Visit (Medicare Advantage)  01/01/2025    Pneumococcal 65+ years Vaccine (2 of 2 - PCV) 05/18/2025 (Originally 12/22/2021)    Shingles vaccine (1 of 2) 09/10/2025 (Originally 6/9/2000)    COVID-19 Vaccine (6 - 2023-24 season) 09/10/2025 (Originally 9/1/2024)    Diabetic foot exam  06/14/2025    GFR test (Diabetes, CKD 3-4, OR last GFR 15-59)  07/11/2025    A1C test (Diabetic or Prediabetic)  09/10/2025    Depression Monitoring  09/10/2025    DTaP/Tdap/Td vaccine (2 - Td or Tdap) 09/26/2032    Flu vaccine  Completed    Respiratory Syncytial Virus (RSV) Pregnant or age 60 yrs+  Completed    AAA screen  Completed    Hepatitis C screen  Completed    Hepatitis A vaccine  Aged Out    Hepatitis B vaccine  Aged Out    Hib vaccine  Aged Out    Polio vaccine  Aged Out    Meningococcal (ACWY) vaccine  Aged Out    Prostate Specific Antigen (PSA) Screening or Monitoring  Discontinued       Hemoglobin A1C (%)   Date Value   09/10/2024 7.8   05/20/2024 10.6   02/13/2024 8.1 (H)             ( goal A1C is < 7)   No components found for: \"LABMICR\"  No components found for: \"LDLCHOLESTEROL\", \"LDLCALC\"    (goal LDL is <100)   AST (U/L)   Date Value   02/12/2024 13     ALT (U/L)   Date Value   02/12/2024 20     BUN (mg/dL)   Date Value   07/11/2024 19     BP Readings from Last 3 Encounters:   09/10/24 134/78   07/31/24 (!) 143/93   05/20/24 110/76          (goal 120/80)    All Future Testing planned

## 2025-01-13 ENCOUNTER — OFFICE VISIT (OUTPATIENT)
Dept: FAMILY MEDICINE CLINIC | Age: 75
End: 2025-01-13

## 2025-01-13 VITALS
BODY MASS INDEX: 40.6 KG/M2 | OXYGEN SATURATION: 96 % | DIASTOLIC BLOOD PRESSURE: 64 MMHG | HEART RATE: 71 BPM | SYSTOLIC BLOOD PRESSURE: 120 MMHG | WEIGHT: 280.4 LBS | TEMPERATURE: 97.5 F

## 2025-01-13 DIAGNOSIS — M54.41 ACUTE RIGHT-SIDED LOW BACK PAIN WITH RIGHT-SIDED SCIATICA: ICD-10-CM

## 2025-01-13 DIAGNOSIS — M45.0 ANKYLOSING SPONDYLITIS OF MULTIPLE SITES IN SPINE (HCC): ICD-10-CM

## 2025-01-13 DIAGNOSIS — E66.01 CLASS 2 SEVERE OBESITY DUE TO EXCESS CALORIES WITH SERIOUS COMORBIDITY AND BODY MASS INDEX (BMI) OF 39.0 TO 39.9 IN ADULT: ICD-10-CM

## 2025-01-13 DIAGNOSIS — I47.10 SVT (SUPRAVENTRICULAR TACHYCARDIA) (HCC): ICD-10-CM

## 2025-01-13 DIAGNOSIS — I50.22 CHRONIC SYSTOLIC (CONGESTIVE) HEART FAILURE (HCC): ICD-10-CM

## 2025-01-13 DIAGNOSIS — F33.2 MODERATELY SEVERE RECURRENT MAJOR DEPRESSION (HCC): ICD-10-CM

## 2025-01-13 DIAGNOSIS — Z79.01 LONG TERM CURRENT USE OF ANTICOAGULANT: ICD-10-CM

## 2025-01-13 DIAGNOSIS — I48.0 PAROXYSMAL ATRIAL FIBRILLATION (HCC): ICD-10-CM

## 2025-01-13 DIAGNOSIS — E66.812 CLASS 2 SEVERE OBESITY DUE TO EXCESS CALORIES WITH SERIOUS COMORBIDITY AND BODY MASS INDEX (BMI) OF 39.0 TO 39.9 IN ADULT: ICD-10-CM

## 2025-01-13 DIAGNOSIS — R82.998 URINE COLOR APPEARS BRIGHT: ICD-10-CM

## 2025-01-13 DIAGNOSIS — I42.8 OTHER CARDIOMYOPATHY (HCC): ICD-10-CM

## 2025-01-13 DIAGNOSIS — E11.59 TYPE 2 DIABETES MELLITUS WITH OTHER CIRCULATORY COMPLICATION, WITHOUT LONG-TERM CURRENT USE OF INSULIN (HCC): Primary | ICD-10-CM

## 2025-01-13 DIAGNOSIS — Z12.5 ENCOUNTER FOR SCREENING FOR MALIGNANT NEOPLASM OF PROSTATE: ICD-10-CM

## 2025-01-13 DIAGNOSIS — M17.11 PRIMARY OSTEOARTHRITIS OF RIGHT KNEE: ICD-10-CM

## 2025-01-13 DIAGNOSIS — R35.1 BENIGN PROSTATIC HYPERPLASIA WITH NOCTURIA: ICD-10-CM

## 2025-01-13 DIAGNOSIS — J62.8 CHRONIC SILICOSIS (HCC): ICD-10-CM

## 2025-01-13 DIAGNOSIS — N40.1 BENIGN PROSTATIC HYPERPLASIA WITH NOCTURIA: ICD-10-CM

## 2025-01-13 LAB
BILIRUBIN, POC: NEGATIVE
BLOOD URINE, POC: NEGATIVE
CLARITY, POC: NORMAL
COLOR, POC: NORMAL
CREATININE URINE POCT: 50
GLUCOSE URINE, POC: NEGATIVE MG/DL
HBA1C MFR BLD: 7.7 %
KETONES, POC: NEGATIVE MG/DL
LEUKOCYTE EST, POC: NEGATIVE
MICROALBUMIN/CREAT 24H UR: 10 MG/DL
MICROALBUMIN/CREAT UR-RTO: <30 MG/G
NITRITE, POC: NEGATIVE
PH, POC: 6
PROTEIN, POC: NEGATIVE MG/DL
SPECIFIC GRAVITY, POC: <=1.005
UROBILINOGEN, POC: 0.2 MG/DL

## 2025-01-13 RX ORDER — OXYCODONE HYDROCHLORIDE 5 MG/1
5 TABLET ORAL EVERY 12 HOURS PRN
Qty: 60 TABLET | Refills: 0 | Status: CANCELLED | OUTPATIENT
Start: 2025-01-13 | End: 2025-02-12

## 2025-01-13 RX ORDER — OXYCODONE HYDROCHLORIDE 5 MG/1
5 TABLET ORAL EVERY 12 HOURS PRN
Qty: 14 TABLET | Refills: 0 | Status: SHIPPED | OUTPATIENT
Start: 2025-01-13 | End: 2025-01-20

## 2025-01-13 RX ORDER — OXYCODONE HYDROCHLORIDE 5 MG/1
5 TABLET ORAL EVERY 12 HOURS PRN
Qty: 20 TABLET | Refills: 0 | Status: CANCELLED | OUTPATIENT
Start: 2025-01-13 | End: 2025-01-23

## 2025-01-13 SDOH — ECONOMIC STABILITY: FOOD INSECURITY: WITHIN THE PAST 12 MONTHS, THE FOOD YOU BOUGHT JUST DIDN'T LAST AND YOU DIDN'T HAVE MONEY TO GET MORE.: NEVER TRUE

## 2025-01-13 SDOH — ECONOMIC STABILITY: FOOD INSECURITY: WITHIN THE PAST 12 MONTHS, YOU WORRIED THAT YOUR FOOD WOULD RUN OUT BEFORE YOU GOT MONEY TO BUY MORE.: NEVER TRUE

## 2025-01-13 ASSESSMENT — ENCOUNTER SYMPTOMS
NAUSEA: 0
ANAL BLEEDING: 0
SHORTNESS OF BREATH: 0
COUGH: 0
DIARRHEA: 0
ABDOMINAL PAIN: 0
BLOOD IN STOOL: 0
VOMITING: 0
CONSTIPATION: 0
CHOKING: 0
WHEEZING: 0
CHEST TIGHTNESS: 0

## 2025-01-13 ASSESSMENT — PATIENT HEALTH QUESTIONNAIRE - PHQ9
3. TROUBLE FALLING OR STAYING ASLEEP: NOT AT ALL
9. THOUGHTS THAT YOU WOULD BE BETTER OFF DEAD, OR OF HURTING YOURSELF: NOT AT ALL
SUM OF ALL RESPONSES TO PHQ QUESTIONS 1-9: 0
4. FEELING TIRED OR HAVING LITTLE ENERGY: NOT AT ALL
5. POOR APPETITE OR OVEREATING: NOT AT ALL
1. LITTLE INTEREST OR PLEASURE IN DOING THINGS: NOT AT ALL
6. FEELING BAD ABOUT YOURSELF - OR THAT YOU ARE A FAILURE OR HAVE LET YOURSELF OR YOUR FAMILY DOWN: NOT AT ALL
10. IF YOU CHECKED OFF ANY PROBLEMS, HOW DIFFICULT HAVE THESE PROBLEMS MADE IT FOR YOU TO DO YOUR WORK, TAKE CARE OF THINGS AT HOME, OR GET ALONG WITH OTHER PEOPLE: NOT DIFFICULT AT ALL
SUM OF ALL RESPONSES TO PHQ QUESTIONS 1-9: 0
SUM OF ALL RESPONSES TO PHQ QUESTIONS 1-9: 0
7. TROUBLE CONCENTRATING ON THINGS, SUCH AS READING THE NEWSPAPER OR WATCHING TELEVISION: NOT AT ALL
SUM OF ALL RESPONSES TO PHQ9 QUESTIONS 1 & 2: 0
SUM OF ALL RESPONSES TO PHQ QUESTIONS 1-9: 0
8. MOVING OR SPEAKING SO SLOWLY THAT OTHER PEOPLE COULD HAVE NOTICED. OR THE OPPOSITE, BEING SO FIGETY OR RESTLESS THAT YOU HAVE BEEN MOVING AROUND A LOT MORE THAN USUAL: NOT AT ALL
2. FEELING DOWN, DEPRESSED OR HOPELESS: NOT AT ALL

## 2025-01-13 ASSESSMENT — VISUAL ACUITY: OU: 1

## 2025-01-13 NOTE — PROGRESS NOTES
[facility]  [place] [id number]  Dept: [phone]  Dept Fax: [phone]      Wallace Moore is a 74 y.o. male who presents today for hismedical conditions/complaints as noted below.  Wallace Moore is c/o of Diabetes (F/u) and Hypertension (F/u)        Assessment/Plan:     1. Type 2 diabetes mellitus with other circulatory complication, without long-term current use of insulin (HCC)  -     POCT glycosylated hemoglobin (Hb A1C)  -     POCT microalbumin  2. Acute right-sided low back pain with right-sided sciatica  3. Ankylosing spondylitis of multiple sites in spine (HCC)  4. Primary osteoarthritis of right knee  5. Long term current use of anticoagulant  6. Encounter for screening for malignant neoplasm of prostate  -     PSA Screening; Future  7. Class 2 severe obesity due to excess calories with serious comorbidity and body mass index (BMI) of 39.0 to 39.9 in adult  8. Benign prostatic hyperplasia with nocturia  9. Paroxysmal atrial fibrillation (HCC)  10. SVT (supraventricular tachycardia) (HCC)  11. Chronic systolic (congestive) heart failure  12. Other cardiomyopathy (HCC)  13. Chronic silicosis (HCC)  14. Moderately severe recurrent major depression (HCC)  15. Urine color appears bright  -     POCT Urinalysis No Micro (Auto)          No follow-ups on file.      HPI     Back is better but knees continue to bother him quite a bit. Has good days and bad days, not too many bad days from weather change.   Diabetes - doing well with current regimen. Denies hypoglycemia, hyperglycemia, fatigue, polyuria, polydipsia, paresthesias, vision changes, dizziness.  Eye exam was done -had eye surgery in September - on Dr. Aaron Raines.  Not following with podiatry.  Patient is taking ACE inhibitor/ARB.  Complications of diabetes include HLD, CAD.   Hypertension-tolerating current regimen without chest pain, palpitations, dizziness, peripheral edema,

## 2025-01-30 DIAGNOSIS — E11.65 UNCONTROLLED TYPE 2 DIABETES MELLITUS WITH HYPERGLYCEMIA (HCC): ICD-10-CM

## 2025-01-30 RX ORDER — INSULIN GLARGINE 100 [IU]/ML
12 INJECTION, SOLUTION SUBCUTANEOUS NIGHTLY
Qty: 9 ML | Refills: 3 | Status: SHIPPED | OUTPATIENT
Start: 2025-01-30

## 2025-01-30 NOTE — TELEPHONE ENCOUNTER
04/08/2024   Albumin/Creatinine Ratio, Urine Once 06/20/2024   Lipid Panel Once 06/19/2024   Hepatic Function Panel Once 09/10/2024   PSA Screening Once 01/13/2025               Patient Active Problem List:     Ankylosing spondylitis of multiple sites in spine (HCC)     Chronic silicosis (HCC)     Moderate persistent asthma without complication     Coronary artery disease involving native coronary artery of native heart without angina pectoris     History of ST elevation myocardial infarction (STEMI)     Class 2 severe obesity due to excess calories with serious comorbidity and body mass index (BMI) of 39.0 to 39.9 in adult     Moderately severe recurrent major depression (HCC)     Eczema of face     Benign prostatic hyperplasia with nocturia     Type 2 diabetes mellitus with other circulatory complication, without long-term current use of insulin (HCC)     Angina pectoris, unspecified     Atherosclerotic heart disease of native coronary artery with unspecified angina pectoris     Atrial fibrillation (HCC)     New onset atrial fibrillation (HCC)     Abnormal echocardiogram     SVT (supraventricular tachycardia) (HCC)     COPD without exacerbation (HCC)     Primary hypertension     DEB treated with BiPAP     Heat syncope     Cardiomyopathy (HCC)     Chronic systolic (congestive) heart failure     Acute right-sided low back pain with right-sided sciatica     Primary osteoarthritis of right knee

## 2025-02-04 DIAGNOSIS — F33.2 MODERATELY SEVERE RECURRENT MAJOR DEPRESSION (HCC): ICD-10-CM

## 2025-02-04 DIAGNOSIS — F41.1 GAD (GENERALIZED ANXIETY DISORDER): ICD-10-CM

## 2025-02-04 RX ORDER — DULOXETIN HYDROCHLORIDE 60 MG/1
120 CAPSULE, DELAYED RELEASE ORAL DAILY
Qty: 180 CAPSULE | Refills: 1 | Status: SHIPPED | OUTPATIENT
Start: 2025-02-04

## 2025-02-04 NOTE — TELEPHONE ENCOUNTER
Last visit: 01/13/2025  Last Med refill: 08/06/2024  Does patient have enough medication for 72 hours: No    Next Visit Date:  Future Appointments   Date Time Provider Department Center   5/14/2025  1:00 PM Carole Norman MD [facility] DEP       Health Maintenance   Topic Date Due    Lipids  03/23/2023    Diabetic retinal exam  03/13/2024    Colorectal Cancer Screen  03/25/2024    Annual Wellness Visit (Medicare Advantage)  01/01/2025    Pneumococcal 65+ years Vaccine (2 of 2 - PCV) 05/18/2025 (Originally 12/22/2021)    Shingles vaccine (1 of 2) 09/10/2025 (Originally 6/9/2000)    COVID-19 Vaccine (6 - 2023-24 season) 09/10/2025 (Originally 9/1/2024)    Diabetic foot exam  06/14/2025    GFR test (Diabetes, CKD 3-4, OR last GFR 15-59)  07/11/2025    A1C test (Diabetic or Prediabetic)  01/13/2026    Diabetic Alb to Cr ratio (uACR) test  01/13/2026    Depression Monitoring  01/13/2026    DTaP/Tdap/Td vaccine (2 - Td or Tdap) 09/26/2032    Flu vaccine  Completed    Respiratory Syncytial Virus (RSV) Pregnant or age 60 yrs+  Completed    AAA screen  Completed    Hepatitis C screen  Completed    Hepatitis A vaccine  Aged Out    Hepatitis B vaccine  Aged Out    Hib vaccine  Aged Out    Polio vaccine  Aged Out    Meningococcal (ACWY) vaccine  Aged Out    Prostate Specific Antigen (PSA) Screening or Monitoring  Discontinued       Hemoglobin A1C (%)   Date Value   01/13/2025 7.7   09/10/2024 7.8   05/20/2024 10.6             ( goal A1C is < 7)   No components found for: \"LABMICR\"  No components found for: \"LDLCHOLESTEROL\", \"LDLCALC\"    (goal LDL is <100)   AST (U/L)   Date Value   02/12/2024 13     ALT (U/L)   Date Value   02/12/2024 20     BUN (mg/dL)   Date Value   07/11/2024 19     BP Readings from Last 3 Encounters:   01/13/25 120/64   09/10/24 134/78   07/31/24 (!) 143/93          (goal 120/80)    All Future Testing planned in [facility]  Lab Frequency Next Occurrence   MRI LUMBAR SPINE WO CONTRAST Once

## 2025-02-12 DIAGNOSIS — M45.0 ANKYLOSING SPONDYLITIS OF MULTIPLE SITES IN SPINE (HCC): ICD-10-CM

## 2025-02-12 DIAGNOSIS — M54.41 ACUTE RIGHT-SIDED LOW BACK PAIN WITH RIGHT-SIDED SCIATICA: ICD-10-CM

## 2025-02-12 DIAGNOSIS — M17.11 PRIMARY OSTEOARTHRITIS OF RIGHT KNEE: ICD-10-CM

## 2025-02-12 DIAGNOSIS — Z79.01 LONG TERM CURRENT USE OF ANTICOAGULANT: ICD-10-CM

## 2025-02-12 RX ORDER — OXYCODONE HYDROCHLORIDE 5 MG/1
5 TABLET ORAL EVERY 12 HOURS PRN
Qty: 60 TABLET | Refills: 0 | Status: SHIPPED | OUTPATIENT
Start: 2025-02-12 | End: 2025-03-14

## 2025-02-12 NOTE — TELEPHONE ENCOUNTER
CONTRAST Once 04/08/2024   Albumin/Creatinine Ratio, Urine Once 06/20/2024   Lipid Panel Once 06/19/2024   Hepatic Function Panel Once 09/10/2024   PSA Screening Once 01/13/2025               Patient Active Problem List:     Ankylosing spondylitis of multiple sites in spine (HCC)     Chronic silicosis (HCC)     Moderate persistent asthma without complication     Coronary artery disease involving native coronary artery of native heart without angina pectoris     History of ST elevation myocardial infarction (STEMI)     Class 2 severe obesity due to excess calories with serious comorbidity and body mass index (BMI) of 39.0 to 39.9 in adult     Moderately severe recurrent major depression (HCC)     Eczema of face     Benign prostatic hyperplasia with nocturia     Type 2 diabetes mellitus with other circulatory complication, without long-term current use of insulin (HCC)     Angina pectoris, unspecified     Atherosclerotic heart disease of native coronary artery with unspecified angina pectoris     Atrial fibrillation (HCC)     New onset atrial fibrillation (HCC)     Abnormal echocardiogram     SVT (supraventricular tachycardia) (HCC)     COPD without exacerbation (HCC)     Primary hypertension     DEB treated with BiPAP     Heat syncope     Cardiomyopathy (HCC)     Chronic systolic (congestive) heart failure     Acute right-sided low back pain with right-sided sciatica     Primary osteoarthritis of right knee

## 2025-03-12 DIAGNOSIS — M54.41 ACUTE RIGHT-SIDED LOW BACK PAIN WITH RIGHT-SIDED SCIATICA: ICD-10-CM

## 2025-03-12 DIAGNOSIS — M45.0 ANKYLOSING SPONDYLITIS OF MULTIPLE SITES IN SPINE (HCC): ICD-10-CM

## 2025-03-12 DIAGNOSIS — M17.11 PRIMARY OSTEOARTHRITIS OF RIGHT KNEE: ICD-10-CM

## 2025-03-12 DIAGNOSIS — Z79.01 LONG TERM CURRENT USE OF ANTICOAGULANT: ICD-10-CM

## 2025-03-12 NOTE — TELEPHONE ENCOUNTER
Last visit: 1/13/25  Last Med refill: 2/12/25  Does patient have enough medication for 72 hours: No:     Next Visit Date:  Future Appointments   Date Time Provider Department Center   5/14/2025  1:00 PM Carole Norman MD [facility] DEP   9/11/2025  1:00 PM Carole [facility] DE[facility]       Health Maintenance   Topic Date Due    Lipids  03/23/2023    Diabetic retinal exam  03/13/2024    Colorectal Cancer Screen  03/25/2024    Annual Wellness Visit (Medicare Advantage)  01/01/2025    Pneumococcal 50+ years Vaccine (2 of 2 - PCV) 05/18/2025 (Originally 12/22/2021)    Shingles vaccine (1 of 2) 09/10/2025 (Originally 6/9/2000)    COVID-19 Vaccine (6 - 2024-25 season) 09/10/2025 (Originally 9/1/2024)    Diabetic foot exam  06/14/2025    GFR test (Diabetes, CKD 3-4, OR last GFR 15-59)  07/11/2025    A1C test (Diabetic or Prediabetic)  01/13/2026    Diabetic Alb to Cr ratio (uACR) test  01/13/2026    Depression Monitoring  01/13/2026    DTaP/Tdap/Td vaccine (2 - Td or Tdap) 09/26/2032    Flu vaccine  Completed    Respiratory Syncytial Virus (RSV) Pregnant or age 60 yrs+  Completed    AAA screen  Completed    Hepatitis C screen  Completed    Hepatitis A vaccine  Aged Out    Hepatitis B vaccine  Aged Out    Hib vaccine  Aged Out    Polio vaccine  Aged Out    Meningococcal (ACWY) vaccine  Aged Out    Meningococcal B vaccine  Aged Out    Prostate Specific Antigen (PSA) Screening or Monitoring  Discontinued       Hemoglobin A1C (%)   Date Value   01/13/2025 7.7   09/10/2024 7.8   05/20/2024 10.6             ( goal A1C is < 7)   No components found for: \"LABMICR\"  No components found for: \"LDLCHOLESTEROL\", \"LDLCALC\"    (goal LDL is <100)   AST (U/L)   Date Value   02/12/2024 13     ALT (U/L)   Date Value   02/12/2024 20     BUN (mg/dL)   Date Value   07/11/2024 19     BP Readings from Last 3 Encounters:   01/13/25 120/64   09/10/24 134/78   07/31/24 (!) 143/93          (goal 120/80)    All

## 2025-03-13 RX ORDER — OXYCODONE HYDROCHLORIDE 5 MG/1
5 TABLET ORAL EVERY 12 HOURS PRN
Qty: 60 TABLET | Refills: 0 | Status: SHIPPED | OUTPATIENT
Start: 2025-03-13 | End: 2025-04-12

## 2025-04-03 DIAGNOSIS — N40.1 BENIGN PROSTATIC HYPERPLASIA WITH NOCTURIA: ICD-10-CM

## 2025-04-03 DIAGNOSIS — R35.1 BENIGN PROSTATIC HYPERPLASIA WITH NOCTURIA: ICD-10-CM

## 2025-04-03 NOTE — TELEPHONE ENCOUNTER
Future Testing planned in [facility]  Lab Frequency Next Occurrence   MRI LUMBAR SPINE WO CONTRAST Once 04/08/2024   Albumin/Creatinine Ratio, Urine Once 06/20/2024   Lipid Panel Once 06/19/2024   Hepatic Function Panel Once 09/10/2024   PSA Screening Once 01/13/2025               Patient Active Problem List:     Ankylosing spondylitis of multiple sites in spine (HCC)     Chronic silicosis (HCC)     Moderate persistent asthma without complication     Coronary artery disease involving native coronary artery of native heart without angina pectoris     History of ST elevation myocardial infarction (STEMI)     Class 2 severe obesity due to excess calories with serious comorbidity and body mass index (BMI) of 39.0 to 39.9 in adult     Moderately severe recurrent major depression (HCC)     Eczema of face     Benign prostatic hyperplasia with nocturia     Type 2 diabetes mellitus with other circulatory complication, without long-term current use of insulin     Angina pectoris, unspecified     Atherosclerotic heart disease of native coronary artery with unspecified angina pectoris     Atrial fibrillation (HCC)     New onset atrial fibrillation (HCC)     Abnormal echocardiogram     SVT (supraventricular tachycardia)     COPD without exacerbation (HCC)     Primary hypertension     DEB treated with BiPAP     Heat syncope     Cardiomyopathy     Chronic systolic (congestive) heart failure     Acute right-sided low back pain with right-sided sciatica     Primary osteoarthritis of right knee

## 2025-04-07 RX ORDER — TAMSULOSIN HYDROCHLORIDE 0.4 MG/1
0.8 CAPSULE ORAL DAILY
Qty: 180 CAPSULE | Refills: 1 | Status: SHIPPED | OUTPATIENT
Start: 2025-04-07

## 2025-04-14 DIAGNOSIS — M54.41 ACUTE RIGHT-SIDED LOW BACK PAIN WITH RIGHT-SIDED SCIATICA: ICD-10-CM

## 2025-04-14 DIAGNOSIS — Z79.01 LONG TERM CURRENT USE OF ANTICOAGULANT: ICD-10-CM

## 2025-04-14 DIAGNOSIS — M17.11 PRIMARY OSTEOARTHRITIS OF RIGHT KNEE: ICD-10-CM

## 2025-04-14 DIAGNOSIS — M45.0 ANKYLOSING SPONDYLITIS OF MULTIPLE SITES IN SPINE (HCC): ICD-10-CM

## 2025-04-14 NOTE — TELEPHONE ENCOUNTER
Last visit: 1/13/25  Last Med refill: 3/13/25  Does patient have enough medication for 72 hours: No:     Next Visit Date:  Future Appointments   Date Time Provider Department Center   5/14/2025  1:00 PM Carole Norman MD [facility] DEP   9/11/2025  1:00 PM Carole [facility] MD [facility] DE[facility]       Health Maintenance   Topic Date Due    Lipids  03/23/2023    Diabetic retinal exam  03/13/2024    Colorectal Cancer Screen  03/25/2024    Annual Wellness Visit (Medicare Advantage)  01/01/2025    Pneumococcal 50+ years Vaccine (2 of 2 - PCV) 05/18/2025 (Originally 12/22/2021)    Shingles vaccine (1 of 2) 09/10/2025 (Originally 6/9/2000)    COVID-19 Vaccine (6 - 2024-25 season) 09/10/2025 (Originally 9/1/2024)    Diabetic foot exam  06/14/2025    GFR test (Diabetes, CKD 3-4, OR last GFR 15-59)  07/11/2025    A1C test (Diabetic or Prediabetic)  01/13/2026    Diabetic Alb to Cr ratio (uACR) test  01/13/2026    Depression Monitoring  01/13/2026    DTaP/Tdap/Td vaccine (2 - Td or Tdap) 09/26/2032    Flu vaccine  Completed    Respiratory Syncytial Virus (RSV) Pregnant or age 60 yrs+  Completed    AAA screen  Completed    Hepatitis C screen  Completed    Hepatitis A vaccine  Aged Out    Hepatitis B vaccine  Aged Out    Hib vaccine  Aged Out    Polio vaccine  Aged Out    Meningococcal (ACWY) vaccine  Aged Out    Meningococcal B vaccine  Aged Out    Prostate Specific Antigen (PSA) Screening or Monitoring  Discontinued       Hemoglobin A1C (%)   Date Value   01/13/2025 7.7   09/10/2024 7.8   05/20/2024 10.6             ( goal A1C is < 7)   No components found for: \"LABMICR\"  No components found for: \"LDLCHOLESTEROL\", \"LDLCALC\"    (goal LDL is <100)   AST (U/L)   Date Value   02/12/2024 13     ALT (U/L)   Date Value   02/12/2024 20     BUN (mg/dL)   Date Value   07/11/2024 19     BP Readings from Last 3 Encounters:   01/13/25 120/64   09/10/24 134/78   07/31/24 (!) 143/93          (goal 120/80)    All

## 2025-04-15 RX ORDER — OXYCODONE HYDROCHLORIDE 5 MG/1
5 TABLET ORAL EVERY 12 HOURS PRN
Qty: 60 TABLET | Refills: 0 | Status: SHIPPED | OUTPATIENT
Start: 2025-04-15 | End: 2025-05-15

## 2025-05-12 DIAGNOSIS — Z79.01 LONG TERM CURRENT USE OF ANTICOAGULANT: ICD-10-CM

## 2025-05-12 DIAGNOSIS — M17.11 PRIMARY OSTEOARTHRITIS OF RIGHT KNEE: ICD-10-CM

## 2025-05-12 DIAGNOSIS — M45.0 ANKYLOSING SPONDYLITIS OF MULTIPLE SITES IN SPINE (HCC): ICD-10-CM

## 2025-05-12 DIAGNOSIS — M54.41 ACUTE RIGHT-SIDED LOW BACK PAIN WITH RIGHT-SIDED SCIATICA: ICD-10-CM

## 2025-05-12 RX ORDER — OXYCODONE HYDROCHLORIDE 5 MG/1
5 TABLET ORAL EVERY 12 HOURS PRN
Qty: 60 TABLET | Refills: 0 | Status: SHIPPED | OUTPATIENT
Start: 2025-05-12 | End: 2025-06-11

## 2025-05-12 NOTE — TELEPHONE ENCOUNTER
Last visit: 01/13/25  Last Med refill: 04/15/25  Does patient have enough medication for 72 hours: Yes    Next Visit Date:  05/14/25  Future Appointments   Date Time Provider Department Center   5/14/2025  1:00 PM Carole [facility] MD [facility] DEP   9/11/2025  1:00 PM [facility] MD [facility]       Health Maintenance   Topic Date Due    Lipids  03/23/2023    Diabetic retinal exam  03/13/2024    Colorectal Cancer Screen  03/25/2024    Pneumococcal 50+ years Vaccine (2 of 2 - PCV) 05/18/2025 (Originally 12/22/2021)    Shingles vaccine (1 of 2) 09/10/2025 (Originally 6/9/2000)    COVID-19 Vaccine (6 - 2024-25 season) 09/10/2025 (Originally 9/1/2024)    Diabetic foot exam  06/14/2025    GFR test (Diabetes, CKD 3-4, OR last GFR 15-59)  07/11/2025    Annual Wellness Visit (Medicare)  09/11/2025    A1C test (Diabetic or Prediabetic)  01/13/2026    Diabetic Alb to Cr ratio (uACR) test  01/13/2026    Depression Monitoring  01/13/2026    DTaP/Tdap/Td vaccine (2 - Td or Tdap) 09/26/2032    Flu vaccine  Completed    Respiratory Syncytial Virus (RSV) Pregnant or age 60 yrs+  Completed    AAA screen  Completed    Hepatitis C screen  Completed    Hepatitis A vaccine  Aged Out    Hepatitis B vaccine  Aged Out    Hib vaccine  Aged Out    Polio vaccine  Aged Out    Meningococcal (ACWY) vaccine  Aged Out    Meningococcal B vaccine  Aged Out    Prostate Specific Antigen (PSA) Screening or Monitoring  Discontinued       Hemoglobin A1C (%)   Date Value   01/13/2025 7.7   09/10/2024 7.8   05/20/2024 10.6             ( goal A1C is < 7)   No components found for: \"LABMICR\"  No components found for: \"LDLCHOLESTEROL\", \"LDLCALC\"    (goal LDL is <100)   AST (U/L)   Date Value   02/12/2024 13     ALT (U/L)   Date Value   02/12/2024 20     BUN (mg/dL)   Date Value   07/11/2024 19     BP Readings from Last 3 Encounters:   01/13/25 120/64   09/10/24 134/78   07/31/24 (!) 143/93          (goal 120/80)    All

## 2025-05-13 DIAGNOSIS — F41.1 GAD (GENERALIZED ANXIETY DISORDER): ICD-10-CM

## 2025-05-13 RX ORDER — BUSPIRONE HYDROCHLORIDE 10 MG/1
10 TABLET ORAL 2 TIMES DAILY
Qty: 180 TABLET | Refills: 1 | Status: SHIPPED | OUTPATIENT
Start: 2025-05-13

## 2025-05-13 NOTE — TELEPHONE ENCOUNTER
Last visit: 01/13/2025  Last Med refill: 10/27/2024  Does patient have enough medication for 72 hours: Yes    Next Visit Date:  Future Appointments   Date Time Provider Department Center   5/14/2025  1:00 PM Carole [facility] MD [facility] DEP   9/11/2025  1:00 PM Carole [facility] MD [facility] DEP       Health Maintenance   Topic Date Due    Lipids  03/23/2023    Diabetic retinal exam  03/13/2024    Colorectal Cancer Screen  03/25/2024    Pneumococcal 50+ years Vaccine (2 of 2 - PCV) 05/18/2025 (Originally 12/22/2021)    Shingles vaccine (1 of 2) 09/10/2025 (Originally 6/9/2000)    COVID-19 Vaccine (6 - 2024-25 season) 09/10/2025 (Originally 9/1/2024)    Diabetic foot exam  06/14/2025    GFR test (Diabetes, CKD 3-4, OR last GFR 15-59)  07/11/2025    Annual Wellness Visit (Medicare)  09/11/2025    A1C test (Diabetic or Prediabetic)  01/13/2026    Diabetic Alb to Cr ratio (uACR) test  01/13/2026    Depression Monitoring  01/13/2026    DTaP/Tdap/Td vaccine (2 - Td or Tdap) 09/26/2032    Flu vaccine  Completed    Respiratory Syncytial Virus (RSV) Pregnant or age 60 yrs+  Completed    AAA screen  Completed    Hepatitis C screen  Completed    Hepatitis A vaccine  Aged Out    Hepatitis B vaccine  Aged Out    Hib vaccine  Aged Out    Polio vaccine  Aged Out    Meningococcal (ACWY) vaccine  Aged Out    Meningococcal B vaccine  Aged Out    Prostate Specific Antigen (PSA) Screening or Monitoring  Discontinued       Hemoglobin A1C (%)   Date Value   01/13/2025 7.7   09/10/2024 7.8   05/20/2024 10.6             ( goal A1C is < 7)   No components found for: \"LABMICR\"  No components found for: \"LDLCHOLESTEROL\", \"LDLCALC\"    (goal LDL is <100)   AST (U/L)   Date Value   02/12/2024 13     ALT (U/L)   Date Value   02/12/2024 20     BUN (mg/dL)   Date Value   07/11/2024 19     BP Readings from Last 3 Encounters:   01/13/25 120/64   09/10/24 134/78   07/31/24 (!) 143/93          (goal 120/80)    All Future

## 2025-05-14 ENCOUNTER — OFFICE VISIT (OUTPATIENT)
Dept: FAMILY MEDICINE CLINIC | Age: 75
End: 2025-05-14

## 2025-05-14 VITALS
HEART RATE: 82 BPM | WEIGHT: 279.8 LBS | OXYGEN SATURATION: 95 % | BODY MASS INDEX: 40.51 KG/M2 | SYSTOLIC BLOOD PRESSURE: 118 MMHG | DIASTOLIC BLOOD PRESSURE: 72 MMHG

## 2025-05-14 DIAGNOSIS — G47.33 OSA TREATED WITH BIPAP: ICD-10-CM

## 2025-05-14 DIAGNOSIS — H61.21 IMPACTED CERUMEN OF RIGHT EAR: ICD-10-CM

## 2025-05-14 DIAGNOSIS — F33.2 MODERATELY SEVERE RECURRENT MAJOR DEPRESSION (HCC): ICD-10-CM

## 2025-05-14 DIAGNOSIS — E11.65 UNCONTROLLED TYPE 2 DIABETES MELLITUS WITH HYPERGLYCEMIA (HCC): ICD-10-CM

## 2025-05-14 DIAGNOSIS — M45.0 ANKYLOSING SPONDYLITIS OF MULTIPLE SITES IN SPINE (HCC): ICD-10-CM

## 2025-05-14 DIAGNOSIS — I42.9 CARDIOMYOPATHY, UNSPECIFIED TYPE (HCC): ICD-10-CM

## 2025-05-14 DIAGNOSIS — E11.59 TYPE 2 DIABETES MELLITUS WITH OTHER CIRCULATORY COMPLICATION, WITHOUT LONG-TERM CURRENT USE OF INSULIN (HCC): Primary | ICD-10-CM

## 2025-05-14 DIAGNOSIS — J44.9 COPD WITHOUT EXACERBATION (HCC): ICD-10-CM

## 2025-05-14 DIAGNOSIS — Z12.11 SCREENING FOR COLON CANCER: ICD-10-CM

## 2025-05-14 DIAGNOSIS — I48.0 PAROXYSMAL ATRIAL FIBRILLATION (HCC): ICD-10-CM

## 2025-05-14 DIAGNOSIS — I10 PRIMARY HYPERTENSION: ICD-10-CM

## 2025-05-14 PROBLEM — M54.41 ACUTE RIGHT-SIDED LOW BACK PAIN WITH RIGHT-SIDED SCIATICA: Status: RESOLVED | Noted: 2024-04-08 | Resolved: 2025-05-14

## 2025-05-14 LAB — HBA1C MFR BLD: 8.4 %

## 2025-05-14 NOTE — PROGRESS NOTES
pain.   Skin: Negative.  Negative for rash.   Neurological:  Negative for dizziness and headaches.   Psychiatric/Behavioral:  Negative for sleep disturbance.    All other systems reviewed and are negative.      Objective     /72 (BP Site: Left Upper Arm, Patient Position: Sitting)   Pulse 82   Wt 126.9 kg (279 lb 12.8 oz)   SpO2 95%   BMI 40.51 kg/m²   Physical Exam  Vitals and nursing note reviewed.   Constitutional:       General: He is not in acute distress.     Appearance: He is well-developed. He is not ill-appearing.   Eyes:      General: Lids are normal. Vision grossly intact.   Cardiovascular:      Rate and Rhythm: Normal rate and regular rhythm.      Heart sounds: Normal heart sounds, S1 normal and S2 normal. No murmur heard.     No friction rub. No gallop.   Pulmonary:      Effort: Pulmonary effort is normal. No respiratory distress.      Breath sounds: Normal breath sounds. No wheezing.   Abdominal:      General: Bowel sounds are normal.      Palpations: Abdomen is soft. There is no mass.      Tenderness: There is no abdominal tenderness. There is no guarding.   Musculoskeletal:         General: Normal range of motion.   Skin:     General: Skin is warm and dry.      Capillary Refill: Capillary refill takes less than 2 seconds.   Neurological:      General: No focal deficit present.      Mental Status: He is alert and oriented to person, place, and time.           Data Review     A1c 8.4% today     Health Maintenance Due   Topic Date Due    Lipids  03/23/2023    Diabetic retinal exam  03/13/2024    Colorectal Cancer Screen  03/25/2024    Diabetic foot exam  06/14/2025           Patient given educational materials- see patient instructions.  Discussed use, benefit, and side effects of prescribedmedications.  All patient questions answered.  Pt voiced understanding. Reviewedhealth maintenance.  Instructed to continue current medications, diet and exercise.Patient agreed with treatment plan. Follow

## 2025-05-19 DIAGNOSIS — E11.65 UNCONTROLLED TYPE 2 DIABETES MELLITUS WITH HYPERGLYCEMIA (HCC): ICD-10-CM

## 2025-05-19 NOTE — TELEPHONE ENCOUNTER
Last visit: 5/14/25  Last Med refill: 11/23/24  Does patient have enough medication for 72 hours: unknown, electronic request    Next Visit Date:  Future Appointments   Date Time Provider Department Center   9/11/2025  1:00 PM Carole [facility] MD [facility] DEP   9/15/2025  1:30 PM Carole [facility] MD [facility] DEP       Health Maintenance   Topic Date Due    Pneumococcal 50+ years Vaccine (2 of 2 - PCV) 12/22/2021    Lipids  03/23/2023    Diabetic retinal exam  03/13/2024    Colorectal Cancer Screen  03/25/2024    Diabetic foot exam  06/14/2025    Shingles vaccine (1 of 2) 09/10/2025 (Originally 6/9/2000)    COVID-19 Vaccine (6 - 2024-25 season) 09/10/2025 (Originally 9/1/2024)    GFR test (Diabetes, CKD 3-4, OR last GFR 15-59)  07/11/2025    Annual Wellness Visit (Medicare)  09/11/2025    Diabetic Alb to Cr ratio (uACR) test  01/13/2026    Depression Monitoring  01/13/2026    A1C test (Diabetic or Prediabetic)  05/14/2026    DTaP/Tdap/Td vaccine (2 - Td or Tdap) 09/26/2032    Flu vaccine  Completed    Respiratory Syncytial Virus (RSV) Pregnant or age 60 yrs+  Completed    AAA screen  Completed    Hepatitis C screen  Completed    Hepatitis A vaccine  Aged Out    Hepatitis B vaccine  Aged Out    Hib vaccine  Aged Out    Polio vaccine  Aged Out    Meningococcal (ACWY) vaccine  Aged Out    Meningococcal B vaccine  Aged Out    Prostate Specific Antigen (PSA) Screening or Monitoring  Discontinued       Hemoglobin A1C (%)   Date Value   05/14/2025 8.4   01/13/2025 7.7   09/10/2024 7.8             ( goal A1C is < 7)   No components found for: \"LABMICR\"  No components found for: \"LDLCHOLESTEROL\", \"LDLCALC\"    (goal LDL is <100)   AST (U/L)   Date Value   02/12/2024 13     ALT (U/L)   Date Value   02/12/2024 20     BUN (mg/dL)   Date Value   07/11/2024 19     BP Readings from Last 3 Encounters:   05/14/25 118/72   01/13/25 120/64   09/10/24 134/78          (goal 120/80)    All Future Testing

## 2025-05-20 RX ORDER — PEN NEEDLE, DIABETIC 32GX 5/32"
NEEDLE, DISPOSABLE MISCELLANEOUS
Qty: 100 EACH | Refills: 3 | Status: SHIPPED | OUTPATIENT
Start: 2025-05-20

## 2025-05-20 RX ORDER — INSULIN GLARGINE 100 [IU]/ML
INJECTION, SOLUTION SUBCUTANEOUS
Qty: 9 ML | Refills: 3 | Status: SHIPPED | OUTPATIENT
Start: 2025-05-20

## 2025-06-16 DIAGNOSIS — Z79.01 LONG TERM CURRENT USE OF ANTICOAGULANT: ICD-10-CM

## 2025-06-16 DIAGNOSIS — M17.11 PRIMARY OSTEOARTHRITIS OF RIGHT KNEE: ICD-10-CM

## 2025-06-16 DIAGNOSIS — M54.41 ACUTE RIGHT-SIDED LOW BACK PAIN WITH RIGHT-SIDED SCIATICA: ICD-10-CM

## 2025-06-16 DIAGNOSIS — M45.0 ANKYLOSING SPONDYLITIS OF MULTIPLE SITES IN SPINE (HCC): ICD-10-CM

## 2025-06-16 NOTE — TELEPHONE ENCOUNTER
Last visit: 05/14/2025  Last Med refill: 05/14/2025  Does patient have enough medication for 72 hours: no    Next Visit Date:  Future Appointments   Date Time Provider Department Center   9/11/2025  1:00 PM Carole [facility] MD [facility] DEP   9/15/2025  1:30 PM Carole Norman MD [facility] DEP       Health Maintenance   Topic Date Due    Pneumococcal 50+ years Vaccine (2 of 2 - PCV) 12/22/2021    Lipids  03/23/2023    Diabetic retinal exam  03/13/2024    Colorectal Cancer Screen  03/25/2024    Diabetic foot exam  06/14/2025    GFR test (Diabetes, CKD 3-4, OR last GFR 15-59)  07/11/2025    Shingles vaccine (1 of 2) 09/10/2025 (Originally 6/9/2000)    COVID-19 Vaccine (6 - 2024-25 season) 09/10/2025 (Originally 9/1/2024)    Annual Wellness Visit (Medicare)  09/11/2025    Diabetic Alb to Cr ratio (uACR) test  01/13/2026    Depression Monitoring  01/13/2026    A1C test (Diabetic or Prediabetic)  05/14/2026    DTaP/Tdap/Td vaccine (2 - Td or Tdap) 09/26/2032    Flu vaccine  Completed    Respiratory Syncytial Virus (RSV) Pregnant or age 60 yrs+  Completed    AAA screen  Completed    Hepatitis C screen  Completed    Hepatitis A vaccine  Aged Out    Hepatitis B vaccine  Aged Out    Hib vaccine  Aged Out    Polio vaccine  Aged Out    Meningococcal (ACWY) vaccine  Aged Out    Meningococcal B vaccine  Aged Out    Prostate Specific Antigen (PSA) Screening or Monitoring  Discontinued       Hemoglobin A1C (%)   Date Value   05/14/2025 8.4   01/13/2025 7.7   09/10/2024 7.8             ( goal A1C is < 7)   No components found for: \"LABMICR\"  No components found for: \"LDLCHOLESTEROL\", \"LDLCALC\"    (goal LDL is <100)   AST (U/L)   Date Value   02/12/2024 13     ALT (U/L)   Date Value   02/12/2024 20     BUN (mg/dL)   Date Value   07/11/2024 19     BP Readings from Last 3 Encounters:   05/14/25 118/72   01/13/25 120/64   09/10/24 134/78          (goal 120/80)    All [facility]

## 2025-06-17 ENCOUNTER — TELEPHONE (OUTPATIENT)
Dept: FAMILY MEDICINE CLINIC | Age: 75
End: 2025-06-17

## 2025-06-17 NOTE — TELEPHONE ENCOUNTER
Pt called into office requesting refill for Oxycodone 5 mg, end date for last prescription ended 6/11/25. LOV 5/14/25. Please advise.

## 2025-06-17 NOTE — TELEPHONE ENCOUNTER
----- Message from Dr. 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

## 2025-06-18 RX ORDER — OXYCODONE HYDROCHLORIDE 5 MG/1
5 TABLET ORAL EVERY 12 HOURS PRN
Qty: 60 TABLET | Refills: 0 | Status: SHIPPED | OUTPATIENT
Start: 2025-06-18 | End: 2025-07-18

## 2025-06-27 ENCOUNTER — TELEPHONE (OUTPATIENT)
Dept: FAMILY MEDICINE CLINIC | Age: 75
End: 2025-06-27

## 2025-07-05 DIAGNOSIS — E11.59 TYPE 2 DIABETES MELLITUS WITH OTHER CIRCULATORY COMPLICATION, WITHOUT LONG-TERM CURRENT USE OF INSULIN (HCC): ICD-10-CM

## 2025-07-07 NOTE — TELEPHONE ENCOUNTER
[facility]  Lab Frequency Next Occurrence   Hepatic Function Panel Once 09/10/2024   PSA Screening Once 01/13/2025               Patient Active Problem List:     Ankylosing spondylitis of multiple sites in spine (HCC)     Chronic silicosis (HCC)     Moderate persistent asthma without complication     Coronary artery disease involving native coronary artery of native heart without angina pectoris     History of ST elevation myocardial infarction (STEMI)     Class 2 severe obesity due to excess calories with serious comorbidity and body mass index (BMI) of 39.0 to 39.9 in adult (HCC)     Moderately severe recurrent major depression (HCC)     Eczema of face     Benign prostatic hyperplasia with nocturia     Type 2 diabetes mellitus with other circulatory complication, without long-term current use of insulin (HCC)     Angina pectoris, unspecified     Atherosclerotic heart disease of native coronary artery with unspecified angina pectoris     Atrial fibrillation (HCC)     New onset atrial fibrillation (HCC)     Abnormal echocardiogram     SVT (supraventricular tachycardia)     COPD without exacerbation (HCC)     Primary hypertension     DEB treated with BiPAP     Heat syncope     Cardiomyopathy (HCC)     Chronic systolic (congestive) heart failure     Primary osteoarthritis of right knee

## 2025-07-08 RX ORDER — METFORMIN HYDROCHLORIDE 500 MG/1
TABLET, EXTENDED RELEASE ORAL
Qty: 180 TABLET | Refills: 1 | Status: SHIPPED | OUTPATIENT
Start: 2025-07-08

## 2025-07-14 DIAGNOSIS — M17.11 PRIMARY OSTEOARTHRITIS OF RIGHT KNEE: ICD-10-CM

## 2025-07-14 DIAGNOSIS — M54.41 ACUTE RIGHT-SIDED LOW BACK PAIN WITH RIGHT-SIDED SCIATICA: ICD-10-CM

## 2025-07-14 DIAGNOSIS — M45.0 ANKYLOSING SPONDYLITIS OF MULTIPLE SITES IN SPINE (HCC): ICD-10-CM

## 2025-07-14 DIAGNOSIS — Z79.01 LONG TERM CURRENT USE OF ANTICOAGULANT: ICD-10-CM

## 2025-07-14 NOTE — TELEPHONE ENCOUNTER
Patient called asking for a refill on oxycodone. States he will going out of town on Thursday and won't be back until the following Monday. States he will run out on Thursday.

## 2025-07-15 RX ORDER — OXYCODONE HYDROCHLORIDE 5 MG/1
5 TABLET ORAL EVERY 12 HOURS PRN
Qty: 60 TABLET | Refills: 0 | Status: SHIPPED | OUTPATIENT
Start: 2025-07-16 | End: 2025-08-15

## 2025-08-04 DIAGNOSIS — F41.1 GAD (GENERALIZED ANXIETY DISORDER): ICD-10-CM

## 2025-08-04 DIAGNOSIS — F33.2 MODERATELY SEVERE RECURRENT MAJOR DEPRESSION (HCC): ICD-10-CM

## 2025-08-05 RX ORDER — DULOXETIN HYDROCHLORIDE 60 MG/1
120 CAPSULE, DELAYED RELEASE ORAL DAILY
Qty: 180 CAPSULE | Refills: 1 | Status: SHIPPED | OUTPATIENT
Start: 2025-08-05

## 2025-08-14 DIAGNOSIS — M54.41 ACUTE RIGHT-SIDED LOW BACK PAIN WITH RIGHT-SIDED SCIATICA: ICD-10-CM

## 2025-08-14 DIAGNOSIS — M45.0 ANKYLOSING SPONDYLITIS OF MULTIPLE SITES IN SPINE (HCC): ICD-10-CM

## 2025-08-14 DIAGNOSIS — M17.11 PRIMARY OSTEOARTHRITIS OF RIGHT KNEE: ICD-10-CM

## 2025-08-14 DIAGNOSIS — Z79.01 LONG TERM CURRENT USE OF ANTICOAGULANT: ICD-10-CM

## 2025-08-15 LAB — NONINV COLON CA DNA+OCC BLD SCRN STL QL: POSITIVE

## 2025-08-15 RX ORDER — OXYCODONE HYDROCHLORIDE 5 MG/1
5 TABLET ORAL EVERY 12 HOURS PRN
Qty: 60 TABLET | Refills: 0 | Status: SHIPPED | OUTPATIENT
Start: 2025-08-15 | End: 2025-09-14

## (undated) DEVICE — PATCH REF EXT FOR CARTO 3 SYS (EA = 6 PACKS)

## (undated) DEVICE — Device

## (undated) DEVICE — CATHETER EP LG 2-8-2 MM 7 FRX95 CM LIVEWIRE

## (undated) DEVICE — CATHETER ABLAT 8FR L115CM 1-6-2MM SPC TIP 3.5MM DF CRV

## (undated) DEVICE — PRESSURE MONITORING SET: Brand: TRUWAVE

## (undated) DEVICE — INTRODUCER SHTH 7FR CANN L11CM 0.038IN STD ORNG W/ MINI

## (undated) DEVICE — TUBING PMP FOR CARTO SYS SMARTABLATE

## (undated) DEVICE — INTRODUCER SHTH AD L11CM DIA 9FR STD BLK S STL PERIPH BRACH

## (undated) DEVICE — CATHETER US 8FR L90CM 4 W STEERING PRECIS FOR SIEMENS

## (undated) DEVICE — STRAP ARMBRD W1.5XL32IN FOAM STR YET SFT W/ HK AND LOOP

## (undated) DEVICE — INTRODUCER TRANSSEPTAL GUID 8.5FR L63CM DIL 8.5FR L67CM

## (undated) DEVICE — NEEDLE PROC AD 18GA L71CM 50DEG S STL TRANSSEPTAL POINTER

## (undated) DEVICE — DRAPE EP LT SUBCLAV ENTRY SHLD SORBX

## (undated) DEVICE — STERILE (15.2 TAPERED TO 7.6 X 183CM) POLYETHYLENE ACCORDION-FOLDED COVER FOR USE WITH SIEMENS ACUNAV ULTRASOUND CATHETER FAMILY CONNECTOR: Brand: SWIFTLINK TRANSDUCER COVER

## (undated) DEVICE — INTRODUCER SHTH STD 8 FRX11 CM FLX KINK RESIST CANN AVNT +

## (undated) DEVICE — CATHETER EP 7FR L115CM 4-4-4MM SPC 22 ELECTRD D CRV